# Patient Record
Sex: FEMALE | Race: WHITE | NOT HISPANIC OR LATINO | Employment: FULL TIME | URBAN - METROPOLITAN AREA
[De-identification: names, ages, dates, MRNs, and addresses within clinical notes are randomized per-mention and may not be internally consistent; named-entity substitution may affect disease eponyms.]

---

## 2017-09-30 ENCOUNTER — ALLSCRIPTS OFFICE VISIT (OUTPATIENT)
Dept: OTHER | Facility: OTHER | Age: 47
End: 2017-09-30

## 2017-09-30 DIAGNOSIS — Z00.00 ENCOUNTER FOR GENERAL ADULT MEDICAL EXAMINATION WITHOUT ABNORMAL FINDINGS: ICD-10-CM

## 2017-09-30 DIAGNOSIS — E78.5 HYPERLIPIDEMIA: ICD-10-CM

## 2017-09-30 DIAGNOSIS — Z01.419 ENCOUNTER FOR GYNECOLOGICAL EXAMINATION WITHOUT ABNORMAL FINDING: ICD-10-CM

## 2017-10-06 LAB
HPV 18 (HISTORICAL): NOT DETECTED
HPV HIGH RISK 16/18 (HISTORICAL): NOT DETECTED
HPV16 (HISTORICAL): NOT DETECTED
PAP (HISTORICAL): NORMAL

## 2017-10-12 ENCOUNTER — TRANSCRIBE ORDERS (OUTPATIENT)
Dept: LAB | Facility: CLINIC | Age: 47
End: 2017-10-12

## 2017-10-12 ENCOUNTER — APPOINTMENT (OUTPATIENT)
Dept: LAB | Facility: CLINIC | Age: 47
End: 2017-10-12
Payer: COMMERCIAL

## 2017-10-12 DIAGNOSIS — E78.5 HYPERLIPIDEMIA: ICD-10-CM

## 2017-10-12 DIAGNOSIS — I10 ESSENTIAL HYPERTENSION, MALIGNANT: Primary | ICD-10-CM

## 2017-10-12 DIAGNOSIS — Z00.00 ENCOUNTER FOR GENERAL ADULT MEDICAL EXAMINATION WITHOUT ABNORMAL FINDINGS: ICD-10-CM

## 2017-10-12 DIAGNOSIS — D51.9 ANEMIA DUE TO VITAMIN B12 DEFICIENCY, UNSPECIFIED B12 DEFICIENCY TYPE: ICD-10-CM

## 2017-10-12 DIAGNOSIS — Z01.419 ENCOUNTER FOR GYNECOLOGICAL EXAMINATION WITHOUT ABNORMAL FINDING: ICD-10-CM

## 2017-10-12 DIAGNOSIS — E78.5 HYPERLIPIDEMIA, UNSPECIFIED HYPERLIPIDEMIA TYPE: ICD-10-CM

## 2017-10-12 LAB
ALBUMIN SERPL BCP-MCNC: 4 G/DL (ref 3.5–5)
ALP SERPL-CCNC: 57 U/L (ref 46–116)
ALT SERPL W P-5'-P-CCNC: 21 U/L (ref 12–78)
ANION GAP SERPL CALCULATED.3IONS-SCNC: 5 MMOL/L (ref 4–13)
AST SERPL W P-5'-P-CCNC: 20 U/L (ref 5–45)
BASOPHILS # BLD AUTO: 0.03 THOUSANDS/ΜL (ref 0–0.1)
BASOPHILS NFR BLD AUTO: 1 % (ref 0–1)
BILIRUB SERPL-MCNC: 0.67 MG/DL (ref 0.2–1)
BUN SERPL-MCNC: 15 MG/DL (ref 5–25)
CALCIUM SERPL-MCNC: 9.8 MG/DL (ref 8.3–10.1)
CHLORIDE SERPL-SCNC: 105 MMOL/L (ref 100–108)
CHOLEST SERPL-MCNC: 210 MG/DL (ref 50–200)
CO2 SERPL-SCNC: 28 MMOL/L (ref 21–32)
CREAT SERPL-MCNC: 0.63 MG/DL (ref 0.6–1.3)
EOSINOPHIL # BLD AUTO: 0.15 THOUSAND/ΜL (ref 0–0.61)
EOSINOPHIL NFR BLD AUTO: 2 % (ref 0–6)
ERYTHROCYTE [DISTWIDTH] IN BLOOD BY AUTOMATED COUNT: 13 % (ref 11.6–15.1)
GFR SERPL CREATININE-BSD FRML MDRD: 109 ML/MIN/1.73SQ M
GLUCOSE P FAST SERPL-MCNC: 80 MG/DL (ref 65–99)
HCT VFR BLD AUTO: 41.2 % (ref 34.8–46.1)
HDLC SERPL-MCNC: 95 MG/DL (ref 40–60)
HGB BLD-MCNC: 13.6 G/DL (ref 11.5–15.4)
LDLC SERPL CALC-MCNC: 104 MG/DL (ref 0–100)
LYMPHOCYTES # BLD AUTO: 2.29 THOUSANDS/ΜL (ref 0.6–4.47)
LYMPHOCYTES NFR BLD AUTO: 36 % (ref 14–44)
MCH RBC QN AUTO: 30.4 PG (ref 26.8–34.3)
MCHC RBC AUTO-ENTMCNC: 33 G/DL (ref 31.4–37.4)
MCV RBC AUTO: 92 FL (ref 82–98)
MONOCYTES # BLD AUTO: 0.44 THOUSAND/ΜL (ref 0.17–1.22)
MONOCYTES NFR BLD AUTO: 7 % (ref 4–12)
NEUTROPHILS # BLD AUTO: 3.44 THOUSANDS/ΜL (ref 1.85–7.62)
NEUTS SEG NFR BLD AUTO: 54 % (ref 43–75)
NRBC BLD AUTO-RTO: 0 /100 WBCS
PLATELET # BLD AUTO: 263 THOUSANDS/UL (ref 149–390)
PMV BLD AUTO: 11.6 FL (ref 8.9–12.7)
POTASSIUM SERPL-SCNC: 4.1 MMOL/L (ref 3.5–5.3)
PROT SERPL-MCNC: 8.1 G/DL (ref 6.4–8.2)
RBC # BLD AUTO: 4.47 MILLION/UL (ref 3.81–5.12)
SODIUM SERPL-SCNC: 138 MMOL/L (ref 136–145)
TRIGL SERPL-MCNC: 55 MG/DL
TSH SERPL DL<=0.05 MIU/L-ACNC: 1.3 UIU/ML (ref 0.36–3.74)
WBC # BLD AUTO: 6.36 THOUSAND/UL (ref 4.31–10.16)

## 2017-10-12 PROCEDURE — 84443 ASSAY THYROID STIM HORMONE: CPT

## 2017-10-12 PROCEDURE — 80053 COMPREHEN METABOLIC PANEL: CPT

## 2017-10-12 PROCEDURE — 86663 EPSTEIN-BARR ANTIBODY: CPT

## 2017-10-12 PROCEDURE — 82306 VITAMIN D 25 HYDROXY: CPT

## 2017-10-12 PROCEDURE — 86664 EPSTEIN-BARR NUCLEAR ANTIGEN: CPT

## 2017-10-12 PROCEDURE — 80061 LIPID PANEL: CPT

## 2017-10-12 PROCEDURE — 85025 COMPLETE CBC W/AUTO DIFF WBC: CPT

## 2017-10-12 PROCEDURE — 86665 EPSTEIN-BARR CAPSID VCA: CPT

## 2017-10-12 PROCEDURE — 36415 COLL VENOUS BLD VENIPUNCTURE: CPT

## 2017-10-14 LAB
EBV EA IGG SER-ACNC: 13.1 U/ML (ref 0–8.9)
EBV NA IGG SER IA-ACNC: >600 U/ML (ref 0–17.9)
EBV PATRN SPEC IB-IMP: ABNORMAL
EBV VCA IGG SER IA-ACNC: 282 U/ML (ref 0–17.9)
EBV VCA IGM SER IA-ACNC: <36 U/ML (ref 0–35.9)

## 2017-10-16 LAB
25(OH)D2 SERPL-MCNC: <1 NG/ML
25(OH)D3 SERPL-MCNC: 43 NG/ML
25(OH)D3+25(OH)D2 SERPL-MCNC: 43 NG/ML

## 2018-01-22 VITALS
SYSTOLIC BLOOD PRESSURE: 118 MMHG | HEIGHT: 65 IN | WEIGHT: 144 LBS | DIASTOLIC BLOOD PRESSURE: 82 MMHG | BODY MASS INDEX: 23.99 KG/M2

## 2019-04-01 ENCOUNTER — ANNUAL EXAM (OUTPATIENT)
Dept: OBGYN CLINIC | Facility: CLINIC | Age: 49
End: 2019-04-01
Payer: COMMERCIAL

## 2019-04-01 VITALS
DIASTOLIC BLOOD PRESSURE: 70 MMHG | SYSTOLIC BLOOD PRESSURE: 120 MMHG | WEIGHT: 146 LBS | BODY MASS INDEX: 24.32 KG/M2 | HEIGHT: 65 IN

## 2019-04-01 DIAGNOSIS — Z12.39 SCREENING FOR MALIGNANT NEOPLASM OF BREAST: ICD-10-CM

## 2019-04-01 DIAGNOSIS — Z01.411 ENCOUNTER FOR GYNECOLOGICAL EXAMINATION WITH ABNORMAL FINDING: Primary | ICD-10-CM

## 2019-04-01 DIAGNOSIS — N95.2 ATROPHIC VAGINITIS: ICD-10-CM

## 2019-04-01 PROCEDURE — S0612 ANNUAL GYNECOLOGICAL EXAMINA: HCPCS | Performed by: OBSTETRICS & GYNECOLOGY

## 2019-04-01 RX ORDER — ESTRADIOL 0.1 MG/G
1 CREAM VAGINAL 3 TIMES WEEKLY
Qty: 42.5 G | Refills: 3 | Status: SHIPPED | OUTPATIENT
Start: 2019-04-01 | End: 2022-01-27 | Stop reason: ALTCHOICE

## 2019-04-10 LAB
HPV HR 12 DNA CVX QL NAA+PROBE: NOT DETECTED
HPV16 DNA SPEC QL NAA+PROBE: NOT DETECTED
HPV18 DNA SPEC QL NAA+PROBE: NOT DETECTED
THIN PREP CVX: NORMAL

## 2019-11-16 ENCOUNTER — HOSPITAL ENCOUNTER (EMERGENCY)
Facility: HOSPITAL | Age: 49
Discharge: HOME | End: 2019-11-17
Attending: EMERGENCY MEDICINE | Admitting: EMERGENCY MEDICINE
Payer: COMMERCIAL

## 2019-11-16 VITALS
OXYGEN SATURATION: 98 % | RESPIRATION RATE: 16 BRPM | DIASTOLIC BLOOD PRESSURE: 76 MMHG | WEIGHT: 145 LBS | TEMPERATURE: 97.9 F | HEIGHT: 65 IN | BODY MASS INDEX: 24.16 KG/M2 | HEART RATE: 80 BPM | SYSTOLIC BLOOD PRESSURE: 134 MMHG

## 2019-11-16 DIAGNOSIS — R10.31 RLQ ABDOMINAL PAIN: Primary | ICD-10-CM

## 2019-11-16 LAB
ALBUMIN SERPL BCP-MCNC: 4.1 G/DL (ref 3.5–5)
ALP SERPL-CCNC: 65 U/L (ref 46–116)
ALT SERPL W P-5'-P-CCNC: 21 U/L (ref 12–78)
ANION GAP SERPL CALCULATED.3IONS-SCNC: 5 MMOL/L (ref 4–13)
AST SERPL W P-5'-P-CCNC: 10 U/L (ref 5–45)
BACTERIA UR QL AUTO: ABNORMAL /HPF
BASOPHILS # BLD AUTO: 0.06 THOUSANDS/ΜL (ref 0–0.1)
BASOPHILS NFR BLD AUTO: 1 % (ref 0–1)
BILIRUB SERPL-MCNC: 0.23 MG/DL (ref 0.2–1)
BILIRUB UR QL STRIP: NEGATIVE
BUN SERPL-MCNC: 14 MG/DL (ref 5–25)
CALCIUM SERPL-MCNC: 9.8 MG/DL (ref 8.3–10.1)
CHLORIDE SERPL-SCNC: 108 MMOL/L (ref 100–108)
CLARITY UR: ABNORMAL
CLARITY, POC: CLEAR
CO2 SERPL-SCNC: 28 MMOL/L (ref 21–32)
COLOR UR: YELLOW
COLOR, POC: YELLOW
CREAT SERPL-MCNC: 0.61 MG/DL (ref 0.6–1.3)
EOSINOPHIL # BLD AUTO: 0.08 THOUSAND/ΜL (ref 0–0.61)
EOSINOPHIL NFR BLD AUTO: 2 % (ref 0–6)
ERYTHROCYTE [DISTWIDTH] IN BLOOD BY AUTOMATED COUNT: 12.7 % (ref 11.6–15.1)
GFR SERPL CREATININE-BSD FRML MDRD: 108 ML/MIN/1.73SQ M
GLUCOSE SERPL-MCNC: 95 MG/DL (ref 65–140)
GLUCOSE UR STRIP-MCNC: NEGATIVE MG/DL
HCT VFR BLD AUTO: 41.6 % (ref 34.8–46.1)
HGB BLD-MCNC: 13.4 G/DL (ref 11.5–15.4)
HGB UR QL STRIP.AUTO: ABNORMAL
HYALINE CASTS #/AREA URNS LPF: ABNORMAL /LPF
IMM GRANULOCYTES # BLD AUTO: 0.01 THOUSAND/UL (ref 0–0.2)
IMM GRANULOCYTES NFR BLD AUTO: 0 % (ref 0–2)
KETONES UR STRIP-MCNC: NEGATIVE MG/DL
LEUKOCYTE ESTERASE UR QL STRIP: NEGATIVE
LIPASE SERPL-CCNC: 120 U/L (ref 73–393)
LYMPHOCYTES # BLD AUTO: 1.75 THOUSANDS/ΜL (ref 0.6–4.47)
LYMPHOCYTES NFR BLD AUTO: 36 % (ref 14–44)
MCH RBC QN AUTO: 30.6 PG (ref 26.8–34.3)
MCHC RBC AUTO-ENTMCNC: 32.2 G/DL (ref 31.4–37.4)
MCV RBC AUTO: 95 FL (ref 82–98)
MONOCYTES # BLD AUTO: 0.34 THOUSAND/ΜL (ref 0.17–1.22)
MONOCYTES NFR BLD AUTO: 7 % (ref 4–12)
NEUTROPHILS # BLD AUTO: 2.59 THOUSANDS/ΜL (ref 1.85–7.62)
NEUTS SEG NFR BLD AUTO: 54 % (ref 43–75)
NITRITE UR QL STRIP: NEGATIVE
NON-SQ EPI CELLS URNS QL MICRO: ABNORMAL /HPF
NRBC BLD AUTO-RTO: 0 /100 WBCS
PH UR STRIP.AUTO: 7.5 [PH] (ref 4.5–8)
PLATELET # BLD AUTO: 253 THOUSANDS/UL (ref 149–390)
PMV BLD AUTO: 10.7 FL (ref 8.9–12.7)
POTASSIUM SERPL-SCNC: 3.8 MMOL/L (ref 3.5–5.3)
PROT SERPL-MCNC: 8 G/DL (ref 6.4–8.2)
PROT UR STRIP-MCNC: NEGATIVE MG/DL
RBC # BLD AUTO: 4.38 MILLION/UL (ref 3.81–5.12)
RBC #/AREA URNS AUTO: ABNORMAL /HPF
SODIUM SERPL-SCNC: 141 MMOL/L (ref 136–145)
SP GR UR STRIP.AUTO: 1.02 (ref 1–1.03)
UROBILINOGEN UR QL STRIP.AUTO: 0.2 E.U./DL
WBC # BLD AUTO: 4.83 THOUSAND/UL (ref 4.31–10.16)
WBC #/AREA URNS AUTO: ABNORMAL /HPF

## 2019-11-16 PROCEDURE — 83690 ASSAY OF LIPASE: CPT | Performed by: EMERGENCY MEDICINE

## 2019-11-16 PROCEDURE — 81001 URINALYSIS AUTO W/SCOPE: CPT

## 2019-11-16 PROCEDURE — 36415 COLL VENOUS BLD VENIPUNCTURE: CPT | Performed by: EMERGENCY MEDICINE

## 2019-11-16 PROCEDURE — 85025 COMPLETE CBC W/AUTO DIFF WBC: CPT | Performed by: EMERGENCY MEDICINE

## 2019-11-16 PROCEDURE — 99284 EMERGENCY DEPT VISIT MOD MDM: CPT

## 2019-11-16 PROCEDURE — 99284 EMERGENCY DEPT VISIT MOD MDM: CPT | Performed by: EMERGENCY MEDICINE

## 2019-11-16 PROCEDURE — 80053 COMPREHEN METABOLIC PANEL: CPT | Performed by: EMERGENCY MEDICINE

## 2019-11-17 ENCOUNTER — APPOINTMENT (EMERGENCY)
Dept: RADIOLOGY | Facility: HOSPITAL | Age: 49
End: 2019-11-17
Payer: COMMERCIAL

## 2019-11-17 LAB
EXT PREG TEST URINE: NEGATIVE
EXT. CONTROL ED NAV: NORMAL

## 2019-11-17 PROCEDURE — 81025 URINE PREGNANCY TEST: CPT | Performed by: EMERGENCY MEDICINE

## 2019-11-17 PROCEDURE — 74177 CT ABD & PELVIS W/CONTRAST: CPT

## 2019-11-17 RX ADMIN — IOHEXOL 100 ML: 350 INJECTION, SOLUTION INTRAVENOUS at 00:05

## 2019-11-17 NOTE — ED PROVIDER NOTES
History  Chief Complaint   Patient presents with    Pelvic Pain     pt reports sharp R sided pelvic pain since about 0900 today  pt reports she had the ovaries and tubes removed 5-6 years ago d/t cysts  pt reports N and loss of appetite  A 51-year-old female status post oophorectomy and salpingectomy presenting with right lower quadrant pain for the last 14 hours  She describes the pain as a sharp nonradiating pain in the right lower quadrant that comes and goes at random and lasts minutes at a time  It is sensitive to palpation in this area  Denies any other aggravating or alleviating factors  She notes associated nausea and decreased appetite  Denies vomiting or diarrhea  Normal bowel movement several hours ago that was soft nonbloody and nonmelanotic  Denies any dysuria hematuria or frequency  No fevers or chills  Prior to Admission Medications   Prescriptions Last Dose Informant Patient Reported? Taking?   estradiol (ESTRACE) 0 1 mg/g vaginal cream   No No   Sig: Insert 1 g into the vagina 3 (three) times a week      Facility-Administered Medications: None       Past Medical History:   Diagnosis Date    Hyperlipidemia        Past Surgical History:   Procedure Laterality Date     SECTION      OOPHORECTOMY Right        Family History   Problem Relation Age of Onset    Diabetes Father     Clotting disorder Father      I have reviewed and agree with the history as documented  Social History     Tobacco Use    Smoking status: Never Smoker    Smokeless tobacco: Never Used   Substance Use Topics    Alcohol use: No    Drug use: No        Review of Systems   Constitutional: Positive for appetite change  Negative for chills and fever  HENT: Negative for congestion and sore throat  Eyes: Negative for visual disturbance  Respiratory: Negative for cough and shortness of breath  Cardiovascular: Negative for chest pain and palpitations     Gastrointestinal: Positive for abdominal pain and nausea  Negative for diarrhea and vomiting  Genitourinary: Negative for difficulty urinating and dysuria  Musculoskeletal: Negative for myalgias  Skin: Negative for rash  Neurological: Negative for weakness, light-headedness, numbness and headaches  Physical Exam  ED Triage Vitals [11/16/19 2034]   Temperature Pulse Respirations Blood Pressure SpO2   (!) 97 4 °F (36 3 °C) 86 16 142/85 100 %      Temp Source Heart Rate Source Patient Position - Orthostatic VS BP Location FiO2 (%)   Oral -- Sitting Left arm --      Pain Score       2             Orthostatic Vital Signs  Vitals:    11/16/19 2034 11/16/19 2330   BP: 142/85 134/76   Pulse: 86 80   Patient Position - Orthostatic VS: Sitting Lying       Physical Exam   Constitutional: She is oriented to person, place, and time  She appears well-developed and well-nourished  No distress  HENT:   Head: Normocephalic and atraumatic  Mouth/Throat: Oropharynx is clear and moist    Eyes: Pupils are equal, round, and reactive to light  EOM are normal    Neck: Normal range of motion  Neck supple  Cardiovascular: Normal rate, regular rhythm, normal heart sounds and intact distal pulses  Pulmonary/Chest: Effort normal and breath sounds normal  No respiratory distress  Abdominal: Soft  Bowel sounds are normal  She exhibits no distension and no mass  There is tenderness  There is rebound  There is no guarding  Musculoskeletal: Normal range of motion  Neurological: She is alert and oriented to person, place, and time  No cranial nerve deficit  Skin: Skin is warm and dry  Capillary refill takes less than 2 seconds  Psychiatric: She has a normal mood and affect  Nursing note and vitals reviewed        ED Medications  Medications   iohexol (OMNIPAQUE) 350 MG/ML injection (MULTI-DOSE) 100 mL (100 mL Intravenous Given 11/17/19 0005)       Diagnostic Studies  Results Reviewed     Procedure Component Value Units Date/Time    POCT pregnancy, urine [[id number]]  (Normal) Resulted:  11/17/19 0100    Lab Status:  Final result Updated:  11/17/19 0100     EXT PREG TEST UR (Ref: Negative) Negative     Control Valid    Comprehensive metabolic panel [[id number]] Collected:  11/16/19 2326    Lab Status:  Final result Specimen:  Blood from Arm, Left Updated:  11/16/19 2357     Sodium 141 mmol/L      Potassium 3 8 mmol/L      Chloride 108 mmol/L      CO2 28 mmol/L      ANION GAP 5 mmol/L      BUN 14 mg/dL      Creatinine 0 61 mg/dL      Glucose 95 mg/dL      Calcium 9 8 mg/dL      AST 10 U/L      ALT 21 U/L      Alkaline Phosphatase 65 U/L      Total Protein 8 0 g/dL      Albumin 4 1 g/dL      Total Bilirubin 0 23 mg/dL      eGFR 108 ml/min/1 73sq m     Narrative:       [place] guidelines for Chronic Kidney Disease (CKD):     Stage 1 with normal or high GFR (GFR > 90 mL/min/1 73 square meters)    Stage 2 Mild CKD (GFR = 60-89 mL/min/1 73 square meters)    Stage 3A Moderate CKD (GFR = 45-59 mL/min/1 73 square meters)    Stage 3B Moderate CKD (GFR = 30-44 mL/min/1 73 square meters)    Stage 4 Severe CKD (GFR = 15-29 mL/min/1 73 square meters)    Stage 5 End Stage CKD (GFR <15 mL/min/1 73 square meters)  Note: GFR calculation is accurate only with a steady state creatinine    Lipase [[id number]]  (Normal) Collected:  11/16/19 2326    Lab Status:  Final result Specimen:  Blood from Arm, Left Updated:  11/16/19 2357     Lipase 120 u/L     Urine Microscopic [[id number]]  (Abnormal) Collected:  11/16/19 2337    Lab Status:  Final result Specimen:  Urine, Clean Catch Updated:  11/16/19 2349     RBC, UA Innumerable /hpf      WBC, UA 2-4 /hpf      Epithelial Cells None Seen /hpf      Bacteria, UA None Seen /hpf      Hyaline Casts, UA None Seen /lpf     CBC and differential [[id number]] Collected:  11/16/19 2326    Lab Status:  Final result Specimen:  Blood from Arm, Left Updated:  11/16/19 2339     WBC 4 83 Thousand/uL      RBC 4 38 Million/uL Hemoglobin 13 4 g/dL      Hematocrit 41 6 %      MCV 95 fL      MCH 30 6 pg      MCHC 32 2 g/dL      RDW 12 7 %      MPV 10 7 fL      Platelets 395 Thousands/uL      nRBC 0 /100 WBCs      Neutrophils Relative 54 %      Immat GRANS % 0 %      Lymphocytes Relative 36 %      Monocytes Relative 7 %      Eosinophils Relative 2 %      Basophils Relative 1 %      Neutrophils Absolute 2 59 Thousands/µL      Immature Grans Absolute 0 01 Thousand/uL      Lymphocytes Absolute 1 75 Thousands/µL      Monocytes Absolute 0 34 Thousand/µL      Eosinophils Absolute 0 08 Thousand/µL      Basophils Absolute 0 06 Thousands/µL     POCT urinalysis dipstick [[id number]]  (Normal) Resulted:  11/16/19 2338    Lab Status:  Final result Specimen:  Urine Updated:  11/16/19 2339     Color, UA yellow     Clarity, UA clear    Urine Macroscopic, POC [[id number]]  (Abnormal) Collected:  11/16/19 2337    Lab Status:  Final result Specimen:  Urine Updated:  11/16/19 2338     Color, UA Yellow     Clarity, UA Cloudy     pH, UA 7 5     Leukocytes, UA Negative     Nitrite, UA Negative     Protein, UA Negative mg/dl      Glucose, UA Negative mg/dl      Ketones, UA Negative mg/dl      Urobilinogen, UA 0 2 E U /dl      Bilirubin, UA Negative     Blood, UA Large     Specific [place], UA 1 020    Narrative:       [facility] RESULT                 CT abdomen pelvis with contrast   Final Result by Rodo Blake DO (11/17 0026)      Small left-sided corpus luteal cyst            Workstation performed: [id number]               Procedures  Procedures        ED Course                               MDM  Number of Diagnoses or Management Options  Diagnosis management comments: 82-year-old female status post oophorectomy inset inject any presenting with right lower quadrant abdominal pain for the last day  Does have some rebound on exam though otherwise appears fairly well  She has a decreased appetite  History and physical are concerning for appendicitis    Will also evaluate for urinary tract infection  Check abdominal labs  CT scan with IV contrast   Patient states she does not need pain medication or nausea medication at this time  Disposition  Final diagnoses:   RLQ abdominal pain     Time reflects when diagnosis was documented in both MDM as applicable and the Disposition within this note     Time User Action Codes Description Comment    11/17/2019  1:11 AM Samaria Reed Add [R10 31] RLQ abdominal pain       ED Disposition     ED Disposition Condition Date/Time Comment    Discharge Stable Sun Nov 17, 2019  1:11 AM Boogie Girard discharge to home/self care  Follow-up Information     Follow up With Specialties Details Why Contact Info Additional Information    Ana Cristina Ventura MD Internal Medicine Schedule an appointment as soon as possible for a visit   [address]  [facility] Emergency Department Emergency Medicine Go to  If symptoms worsen [address] [facility] [address]  [phone] [facility], [address] [place] [address], [id number]   [phone]          Discharge Medication List as of 11/17/2019  1:12 AM      CONTINUE these medications which have NOT CHANGED    Details   estradiol (ESTRACE) 0 1 mg/g vaginal cream Insert 1 g into the vagina 3 (three) times a week, Starting Mon 4/1/2019, Normal           No discharge procedures on file  ED Provider  Attending physically available and evaluated Boogie Girard I managed the patient along with the ED Attending      Electronically Signed by         Han Colorado MD  11/17/19 1980

## 2019-11-17 NOTE — ED NOTES
Urine sample being sent back up from lab to complete Urine POCT      Waqas Reinoso RN  11/17/19 0443

## 2019-11-18 NOTE — ED ATTENDING ATTESTATION
11/16/2019  I, Bora Estevez DO, saw and evaluated the patient  I have discussed the patient with the resident/non-physician practitioner and agree with the resident's/non-physician practitioner's findings, Plan of Care, and MDM as documented in the resident's/non-physician practitioner's note, except where noted  All available labs and Radiology studies were reviewed  I was present for key portions of any procedure(s) performed by the resident/non-physician practitioner and I was immediately available to provide assistance  At this point I agree with the current assessment done in the Emergency Department  I have conducted an independent evaluation of this patient a history and physical is as follows:    ED Course       53 y/o female presenting with RLQ pain x 14 hours  Pt with past surgical history of right sided oophorectomy and salpingectomy  TTP in RLQ over McBurney's point x 12 hours  Colicky, intermittent pain  No zoster rash  No guarding, but mild rebound Is noted on exam    Afebrile, but does endorse anorexia  Will obtain CT to assess for appendicitis  No urinary complaints  UPreg negative  Labs reviewed  CT neg x for left corpus luteal cyst    Jan Bullion diet, motrin/tylenol, f/u PCP, return if worsens       Critical Care Time  Procedures

## 2020-01-10 ENCOUNTER — HOSPITAL ENCOUNTER (EMERGENCY)
Facility: HOSPITAL | Age: 50
Discharge: HOME | End: 2020-01-11
Attending: EMERGENCY MEDICINE | Admitting: EMERGENCY MEDICINE
Payer: COMMERCIAL

## 2020-01-10 VITALS
OXYGEN SATURATION: 99 % | SYSTOLIC BLOOD PRESSURE: 144 MMHG | DIASTOLIC BLOOD PRESSURE: 60 MMHG | TEMPERATURE: 98.1 F | RESPIRATION RATE: 18 BRPM | BODY MASS INDEX: 24.62 KG/M2 | WEIGHT: 147.93 LBS | HEART RATE: 61 BPM

## 2020-01-10 DIAGNOSIS — R07.89 ATYPICAL CHEST PAIN: Primary | ICD-10-CM

## 2020-01-10 LAB
ALBUMIN SERPL BCP-MCNC: 3.8 G/DL (ref 3.5–5)
ALP SERPL-CCNC: 73 U/L (ref 46–116)
ALT SERPL W P-5'-P-CCNC: 26 U/L (ref 12–78)
ANION GAP SERPL CALCULATED.3IONS-SCNC: 4 MMOL/L (ref 4–13)
APTT PPP: 29 SECONDS (ref 23–37)
AST SERPL W P-5'-P-CCNC: 18 U/L (ref 5–45)
BASOPHILS # BLD AUTO: 0.05 THOUSANDS/ΜL (ref 0–0.1)
BASOPHILS NFR BLD AUTO: 1 % (ref 0–1)
BILIRUB SERPL-MCNC: 0.19 MG/DL (ref 0.2–1)
BUN SERPL-MCNC: 18 MG/DL (ref 5–25)
CALCIUM SERPL-MCNC: 9.9 MG/DL (ref 8.3–10.1)
CHLORIDE SERPL-SCNC: 107 MMOL/L (ref 100–108)
CO2 SERPL-SCNC: 31 MMOL/L (ref 21–32)
CREAT SERPL-MCNC: 0.58 MG/DL (ref 0.6–1.3)
EOSINOPHIL # BLD AUTO: 0.2 THOUSAND/ΜL (ref 0–0.61)
EOSINOPHIL NFR BLD AUTO: 4 % (ref 0–6)
ERYTHROCYTE [DISTWIDTH] IN BLOOD BY AUTOMATED COUNT: 12.6 % (ref 11.6–15.1)
GFR SERPL CREATININE-BSD FRML MDRD: 110 ML/MIN/1.73SQ M
GLUCOSE SERPL-MCNC: 94 MG/DL (ref 65–140)
HCT VFR BLD AUTO: 39.4 % (ref 34.8–46.1)
HGB BLD-MCNC: 12.8 G/DL (ref 11.5–15.4)
IMM GRANULOCYTES # BLD AUTO: 0.01 THOUSAND/UL (ref 0–0.2)
IMM GRANULOCYTES NFR BLD AUTO: 0 % (ref 0–2)
INR PPP: 0.96 (ref 0.84–1.19)
LYMPHOCYTES # BLD AUTO: 2.3 THOUSANDS/ΜL (ref 0.6–4.47)
LYMPHOCYTES NFR BLD AUTO: 46 % (ref 14–44)
MCH RBC QN AUTO: 30.3 PG (ref 26.8–34.3)
MCHC RBC AUTO-ENTMCNC: 32.5 G/DL (ref 31.4–37.4)
MCV RBC AUTO: 93 FL (ref 82–98)
MONOCYTES # BLD AUTO: 0.44 THOUSAND/ΜL (ref 0.17–1.22)
MONOCYTES NFR BLD AUTO: 9 % (ref 4–12)
NEUTROPHILS # BLD AUTO: 1.99 THOUSANDS/ΜL (ref 1.85–7.62)
NEUTS SEG NFR BLD AUTO: 40 % (ref 43–75)
NRBC BLD AUTO-RTO: 0 /100 WBCS
PLATELET # BLD AUTO: 237 THOUSANDS/UL (ref 149–390)
PMV BLD AUTO: 10.8 FL (ref 8.9–12.7)
POTASSIUM SERPL-SCNC: 3.9 MMOL/L (ref 3.5–5.3)
PROT SERPL-MCNC: 7.5 G/DL (ref 6.4–8.2)
PROTHROMBIN TIME: 12.2 SECONDS (ref 11.6–14.5)
RBC # BLD AUTO: 4.23 MILLION/UL (ref 3.81–5.12)
SODIUM SERPL-SCNC: 142 MMOL/L (ref 136–145)
TROPONIN I SERPL-MCNC: <0.02 NG/ML
WBC # BLD AUTO: 4.99 THOUSAND/UL (ref 4.31–10.16)

## 2020-01-10 PROCEDURE — 36415 COLL VENOUS BLD VENIPUNCTURE: CPT | Performed by: EMERGENCY MEDICINE

## 2020-01-10 PROCEDURE — 84484 ASSAY OF TROPONIN QUANT: CPT | Performed by: EMERGENCY MEDICINE

## 2020-01-10 PROCEDURE — 85610 PROTHROMBIN TIME: CPT | Performed by: EMERGENCY MEDICINE

## 2020-01-10 PROCEDURE — 85730 THROMBOPLASTIN TIME PARTIAL: CPT | Performed by: EMERGENCY MEDICINE

## 2020-01-10 PROCEDURE — 99285 EMERGENCY DEPT VISIT HI MDM: CPT

## 2020-01-10 PROCEDURE — 93005 ELECTROCARDIOGRAM TRACING: CPT

## 2020-01-10 PROCEDURE — 99284 EMERGENCY DEPT VISIT MOD MDM: CPT | Performed by: EMERGENCY MEDICINE

## 2020-01-10 PROCEDURE — 80053 COMPREHEN METABOLIC PANEL: CPT | Performed by: EMERGENCY MEDICINE

## 2020-01-10 PROCEDURE — 85025 COMPLETE CBC W/AUTO DIFF WBC: CPT | Performed by: EMERGENCY MEDICINE

## 2020-01-11 LAB
ATRIAL RATE: 72 BPM
P AXIS: 64 DEGREES
PR INTERVAL: 132 MS
QRS AXIS: 63 DEGREES
QRSD INTERVAL: 96 MS
QT INTERVAL: 368 MS
QTC INTERVAL: 402 MS
T WAVE AXIS: 62 DEGREES
VENTRICULAR RATE: 72 BPM

## 2020-01-11 PROCEDURE — 93010 ELECTROCARDIOGRAM REPORT: CPT | Performed by: INTERNAL MEDICINE

## 2020-01-11 NOTE — ED PROVIDER NOTES
History  Chief Complaint   Patient presents with    Chest Pain     Pt presents to ED with left sided chest pain that radiates down left arm starting around 4:30pm  Pt reports dizziness  History provided by:  Patient and spouse  Chest Pain   Pain location:  L lateral chest  Pain quality: aching    Pain radiates to:  Does not radiate  Pain radiates to the back: no    Pain severity:  Mild  Onset quality:  Gradual  Duration:  1 day (Since waking over 430 this morning)  Timing:  Constant  Progression:  Waxing and waning  Chronicity:  New  Context comment:  Patient states she woke up at 4:30 a m  This morning head some left-sided achy chest pain, has been constant since  , waxing and waning in intensity but constant  No triggering or worsening factors not worsened with exertionno associated diaphoresis or SOB  Relieved by:  None tried  Worsened by:  Nothing tried  Ineffective treatments:  None tried  Associated symptoms: numbness    Associated symptoms: no abdominal pain, no cough, no diaphoresis, no dizziness, no fever, no headache, no nausea, no palpitations, no shortness of breath and not vomiting    Associated symptoms comment:  Intermittent numbness and tingling left side of face and left arm, this is been a problem for multiple months      Prior to Admission Medications   Prescriptions Last Dose Informant Patient Reported? Taking?   estradiol (ESTRACE) 0 1 mg/g vaginal cream   No No   Sig: Insert 1 g into the vagina 3 (three) times a week      Facility-Administered Medications: None       Past Medical History:   Diagnosis Date    Hyperlipidemia        Past Surgical History:   Procedure Laterality Date     SECTION      OOPHORECTOMY Right        Family History   Problem Relation Age of Onset    Diabetes Father     Clotting disorder Father      I have reviewed and agree with the history as documented      Social History     Tobacco Use    Smoking status: Never Smoker    Smokeless tobacco: Never Used   Substance Use Topics    Alcohol use: No    Drug use: No        Review of Systems   Constitutional: Negative for activity change, chills, diaphoresis and fever  HENT: Negative for congestion, sinus pressure and sore throat  Eyes: Negative for pain and visual disturbance  Respiratory: Negative for cough, chest tightness, shortness of breath, wheezing and stridor  Cardiovascular: Positive for chest pain  Negative for palpitations  Gastrointestinal: Negative for abdominal distention, abdominal pain, constipation, diarrhea, nausea and vomiting  Genitourinary: Negative for dysuria and frequency  Musculoskeletal: Negative for neck pain and neck stiffness  Skin: Negative for rash  Neurological: Positive for numbness  Negative for dizziness, speech difficulty, light-headedness and headaches  Physical Exam  Physical Exam   Constitutional: She is oriented to person, place, and time  She appears well-developed  No distress  HENT:   Head: Normocephalic and atraumatic  Eyes: Pupils are equal, round, and reactive to light  Neck: Normal range of motion  Neck supple  No tracheal deviation present  Cardiovascular: Normal rate, regular rhythm, normal heart sounds and intact distal pulses  No murmur heard  Pulmonary/Chest: Effort normal and breath sounds normal  No stridor  No respiratory distress  Abdominal: Soft  She exhibits no distension  There is no tenderness  There is no rebound and no guarding  Musculoskeletal: Normal range of motion  Neurological: She is alert and oriented to person, place, and time  Skin: Skin is warm and dry  She is not diaphoretic  No erythema  No pallor  Psychiatric: She has a normal mood and affect  Vitals reviewed        Vital Signs  ED Triage Vitals [01/10/20 1845]   Temperature Pulse Respirations Blood Pressure SpO2   98 1 °F (36 7 °C) 81 18 146/80 100 %      Temp Source Heart Rate Source Patient Position - Orthostatic VS BP Location FiO2 (%) Oral Monitor Lying Right arm --      Pain Score       4           Vitals:    01/10/20 1845 01/10/20 2001   BP: 146/80 144/60   Pulse: 81 61   Patient Position - Orthostatic VS: Lying Sitting         Visual Acuity      ED Medications  Medications - No data to display    Diagnostic Studies  Results Reviewed     Procedure Component Value Units Date/Time    Comprehensive metabolic panel [[id number]]  (Abnormal) Collected:  01/10/20 2022    Lab Status:  Final result Specimen:  Blood from Arm, Left Updated:  01/10/20 2051     Sodium 142 mmol/L      Potassium 3 9 mmol/L      Chloride 107 mmol/L      CO2 31 mmol/L      ANION GAP 4 mmol/L      BUN 18 mg/dL      Creatinine 0 58 mg/dL      Glucose 94 mg/dL      Calcium 9 9 mg/dL      AST 18 U/L      ALT 26 U/L      Alkaline Phosphatase 73 U/L      Total Protein 7 5 g/dL      Albumin 3 8 g/dL      Total Bilirubin 0 19 mg/dL      eGFR 110 ml/min/1 73sq m     Narrative:       [place] guidelines for Chronic Kidney Disease (CKD):     Stage 1 with normal or high GFR (GFR > 90 mL/min/1 73 square meters)    Stage 2 Mild CKD (GFR = 60-89 mL/min/1 73 square meters)    Stage 3A Moderate CKD (GFR = 45-59 mL/min/1 73 square meters)    Stage 3B Moderate CKD (GFR = 30-44 mL/min/1 73 square meters)    Stage 4 Severe CKD (GFR = 15-29 mL/min/1 73 square meters)    Stage 5 End Stage CKD (GFR <15 mL/min/1 73 square meters)  Note: GFR calculation is accurate only with a steady state creatinine    Protime-INR [[id number]]  (Normal) Collected:  01/10/20 2022    Lab Status:  Final result Specimen:  Blood from Arm, Left Updated:  01/10/20 2037     Protime 12 2 seconds      INR 0 96    APTT [[id number]]  (Normal) Collected:  01/10/20 2022    Lab Status:  Final result Specimen:  Blood from Arm, Left Updated:  01/10/20 2037     PTT 29 seconds     Troponin I [[id number]]  (Normal) Collected:  01/10/20 1945    Lab Status:  Final result Specimen:  Blood from Arm, Right Updated: 01/10/20 2011     Troponin I <0 02 ng/mL     CBC and differential [[id number]]  (Abnormal) Collected:  01/10/20 1945    Lab Status:  Final result Specimen:  Blood from Arm, Right Updated:  01/10/20 1952     WBC 4 99 Thousand/uL      RBC 4 23 Million/uL      Hemoglobin 12 8 g/dL      Hematocrit 39 4 %      MCV 93 fL      MCH 30 3 pg      MCHC 32 5 g/dL      RDW 12 6 %      MPV 10 8 fL      Platelets 630 Thousands/uL      nRBC 0 /100 WBCs      Neutrophils Relative 40 %      Immat GRANS % 0 %      Lymphocytes Relative 46 %      Monocytes Relative 9 %      Eosinophils Relative 4 %      Basophils Relative 1 %      Neutrophils Absolute 1 99 Thousands/µL      Immature Grans Absolute 0 01 Thousand/uL      Lymphocytes Absolute 2 30 Thousands/µL      Monocytes Absolute 0 44 Thousand/µL      Eosinophils Absolute 0 20 Thousand/µL      Basophils Absolute 0 05 Thousands/µL                  No orders to display              Procedures  ECG 12 Lead Documentation Only  Date/Time: 1/10/2020 7:46 PM  Performed by: Carmen Dubose DO  Authorized by: Carmen Dubose DO     ECG reviewed by me, the ED Provider: yes    Patient location:  ED  Previous ECG:     Previous ECG:  Compared to current    Comparison ECG info:  12 18 2012    Similarity:  No change  Interpretation:     Interpretation: normal    Rate:     ECG rate:  72    ECG rate assessment: normal    Rhythm:     Rhythm: sinus rhythm    Ectopy:     Ectopy: none    QRS:     QRS axis:  Normal    QRS intervals:  Normal  Conduction:     Conduction: normal    ST segments:     ST segments:  Normal  T waves:     T waves: normal               ED Course                               MDM  Number of Diagnoses or Management Options  Atypical chest pain: new and requires workup  Diagnosis management comments:       Initial ED assessment:  Nontoxic appearing 61-year-old cardiac risk factors no early family coronary artery disease no family history of early sudden death, never diagnosed hypertension hyperlipidemia  , never smoker  Presents with chest pain since awakening this morning  , pain has been waxing waning in intensity but having some degree of constant pain all day  , no diaphoresis no shortness of breath    Initial DDx includes but is not limited to:   Doubt cardiac etiology most likely musculoskeletal     Initial ED plan:   Blood work, EKG, likely discharge        Final ED summary/disposition:   After evaluation and workup in the emergency department, workup unremarkable, patient discharged will follow with outpatient PCP        Amount and/or Complexity of Data Reviewed  Clinical lab tests: ordered and reviewed  Tests in the radiology section of CPT®: ordered and reviewed  Decide to obtain previous medical records or to obtain history from someone other than the patient: yes  Obtain history from someone other than the patient: yes  Review and summarize past medical records: yes  Independent visualization of images, tracings, or specimens: yes          Disposition  Final diagnoses:   Atypical chest pain     Time reflects when diagnosis was documented in both MDM as applicable and the Disposition within this note     Time User Action Codes Description Comment    1/10/2020  9:24 PM Garrison Seferino Add [R07 89] Atypical chest pain       ED Disposition     ED Disposition Condition Date/Time Comment    Discharge Stable Fri Reynold 10, 2020  9:24 PM Brian Dias discharge to home/self care  Follow-up Information    None         Patient's Medications   Discharge Prescriptions    No medications on file     No discharge procedures on file      ED Provider  Electronically Signed by           Estephania Johnson DO  01/10/20 9120

## 2020-09-03 ENCOUNTER — TELEPHONE (OUTPATIENT)
Dept: OBGYN CLINIC | Facility: CLINIC | Age: 50
End: 2020-09-03

## 2020-09-03 DIAGNOSIS — N92.6 IRREGULAR MENSTRUAL CYCLE: Primary | ICD-10-CM

## 2020-09-03 NOTE — TELEPHONE ENCOUNTER
Pt will complete TFTs at [address] we will call back with results  Keeping annual apt with Dr Rolanda العلي

## 2020-09-03 NOTE — TELEPHONE ENCOUNTER
Black cohosh and Estroven only really help minimally, if she would like to try she can just needs to watch for BP increases  Can send for thyroid studies to be sure normal but likely perimenopausal symptoms and Dr Evette Tsang can review further at her annual or you can offer her an appointment sooner to discuss other options

## 2020-09-03 NOTE — TELEPHONE ENCOUNTER
No period Jan 2020, Feb monthly occurrence until June when she got menses 11 days early  Then in July got menses a week late  On August 5  menses started then 11 days later got bleeding again on August 16  Menses lasts for 4 days use to get menses every 28 days  Pt feels like her period is always coming - hormonal symptoms (tired, emotional, crampy)  Pt currently Midol taken daily 4 times  Pt would like to know what she can take to help with her symptoms understand she could be starting the changes  Denies heat flashes or vaginal dryness  Would you recommend black cohost or estroven?  Pt has annual with Dr Ryan Barrera 10/16/20

## 2020-09-05 LAB
T4 FREE SERPL-MCNC: 0.9 NG/DL (ref 0.8–1.8)
TSH SERPL-ACNC: 3.44 MIU/L

## 2020-09-10 ENCOUNTER — TELEPHONE (OUTPATIENT)
Dept: OBGYN CLINIC | Facility: CLINIC | Age: 50
End: 2020-09-10

## 2020-09-10 NOTE — TELEPHONE ENCOUNTER
Patient has annual scheduled for October with Dr Roberto Kendrick  Was sent to TFTs due to irreg cycles and feelings of getting menses and also hormonal changes  Not sure if going through the change  Patient aware will review with DR Roberto Kendrick prior to apt and see if she would like her to have any other labs prior

## 2020-09-11 DIAGNOSIS — E34.9 HORMONE DISTURBANCE: ICD-10-CM

## 2020-09-11 DIAGNOSIS — N92.6 IRREGULAR MENSTRUAL CYCLE: Primary | ICD-10-CM

## 2020-09-14 LAB
BASOPHILS # BLD AUTO: 50 CELLS/UL (ref 0–200)
BASOPHILS NFR BLD AUTO: 0.9 %
EOSINOPHIL # BLD AUTO: 171 CELLS/UL (ref 15–500)
EOSINOPHIL NFR BLD AUTO: 3.1 %
ERYTHROCYTE [DISTWIDTH] IN BLOOD BY AUTOMATED COUNT: 12.7 % (ref 11–15)
FERRITIN SERPL-MCNC: 26 NG/ML (ref 16–232)
FSH SERPL-ACNC: 14.6 MIU/ML
HCT VFR BLD AUTO: 40 % (ref 35–45)
HGB BLD-MCNC: 13 G/DL (ref 11.7–15.5)
IRON SATN MFR SERPL: 40 % (CALC) (ref 16–45)
IRON SERPL-MCNC: 104 MCG/DL (ref 40–190)
LH SERPL-ACNC: 22.1 MIU/ML
LYMPHOCYTES # BLD AUTO: 2019 CELLS/UL (ref 850–3900)
LYMPHOCYTES NFR BLD AUTO: 36.7 %
MCH RBC QN AUTO: 30 PG (ref 27–33)
MCHC RBC AUTO-ENTMCNC: 32.5 G/DL (ref 32–36)
MCV RBC AUTO: 92.2 FL (ref 80–100)
MONOCYTES # BLD AUTO: 479 CELLS/UL (ref 200–950)
MONOCYTES NFR BLD AUTO: 8.7 %
NEUTROPHILS # BLD AUTO: 2783 CELLS/UL (ref 1500–7800)
NEUTROPHILS NFR BLD AUTO: 50.6 %
PLATELET # BLD AUTO: 245 THOUSAND/UL (ref 140–400)
PMV BLD REES-ECKER: 11.4 FL (ref 7.5–12.5)
RBC # BLD AUTO: 4.34 MILLION/UL (ref 3.8–5.1)
TIBC SERPL-MCNC: 262 MCG/DL (CALC) (ref 250–450)
WBC # BLD AUTO: 5.5 THOUSAND/UL (ref 3.8–10.8)

## 2020-09-30 ENCOUNTER — APPOINTMENT (EMERGENCY)
Dept: RADIOLOGY | Facility: HOSPITAL | Age: 50
End: 2020-09-30
Payer: COMMERCIAL

## 2020-09-30 ENCOUNTER — HOSPITAL ENCOUNTER (EMERGENCY)
Facility: HOSPITAL | Age: 50
Discharge: HOME | End: 2020-09-30
Attending: EMERGENCY MEDICINE | Admitting: EMERGENCY MEDICINE
Payer: COMMERCIAL

## 2020-09-30 VITALS
HEART RATE: 100 BPM | DIASTOLIC BLOOD PRESSURE: 80 MMHG | SYSTOLIC BLOOD PRESSURE: 132 MMHG | RESPIRATION RATE: 18 BRPM | OXYGEN SATURATION: 99 % | TEMPERATURE: 98.9 F

## 2020-09-30 DIAGNOSIS — N20.0 NEPHROLITHIASIS: ICD-10-CM

## 2020-09-30 DIAGNOSIS — M54.9 BACK PAIN: Primary | ICD-10-CM

## 2020-09-30 LAB
ALBUMIN SERPL BCP-MCNC: 3.7 G/DL (ref 3.5–5)
ALP SERPL-CCNC: 69 U/L (ref 46–116)
ALT SERPL W P-5'-P-CCNC: 23 U/L (ref 12–78)
ANION GAP SERPL CALCULATED.3IONS-SCNC: 4 MMOL/L (ref 4–13)
AST SERPL W P-5'-P-CCNC: 18 U/L (ref 5–45)
BACTERIA UR QL AUTO: ABNORMAL /HPF
BASOPHILS # BLD AUTO: 0.06 THOUSANDS/ΜL (ref 0–0.1)
BASOPHILS NFR BLD AUTO: 1 % (ref 0–1)
BILIRUB SERPL-MCNC: 0.12 MG/DL (ref 0.2–1)
BILIRUB UR QL STRIP: NEGATIVE
BUN SERPL-MCNC: 25 MG/DL (ref 5–25)
CALCIUM SERPL-MCNC: 10 MG/DL (ref 8.3–10.1)
CHLORIDE SERPL-SCNC: 108 MMOL/L (ref 100–108)
CLARITY UR: ABNORMAL
CO2 SERPL-SCNC: 28 MMOL/L (ref 21–32)
COLOR UR: YELLOW
COLOR, POC: NORMAL
CREAT SERPL-MCNC: 0.91 MG/DL (ref 0.6–1.3)
EOSINOPHIL # BLD AUTO: 0.15 THOUSAND/ΜL (ref 0–0.61)
EOSINOPHIL NFR BLD AUTO: 2 % (ref 0–6)
ERYTHROCYTE [DISTWIDTH] IN BLOOD BY AUTOMATED COUNT: 13.2 % (ref 11.6–15.1)
EXT PREG TEST URINE: NEGATIVE
EXT. CONTROL ED NAV: NORMAL
GFR SERPL CREATININE-BSD FRML MDRD: 75 ML/MIN/1.73SQ M
GLUCOSE SERPL-MCNC: 126 MG/DL (ref 65–140)
GLUCOSE UR STRIP-MCNC: NEGATIVE MG/DL
HCT VFR BLD AUTO: 35.5 % (ref 34.8–46.1)
HGB BLD-MCNC: 12 G/DL (ref 11.5–15.4)
HGB UR QL STRIP.AUTO: ABNORMAL
HYALINE CASTS #/AREA URNS LPF: ABNORMAL /LPF
IMM GRANULOCYTES # BLD AUTO: 0.01 THOUSAND/UL (ref 0–0.2)
IMM GRANULOCYTES NFR BLD AUTO: 0 % (ref 0–2)
KETONES UR STRIP-MCNC: NEGATIVE MG/DL
LEUKOCYTE ESTERASE UR QL STRIP: NEGATIVE
LIPASE SERPL-CCNC: 158 U/L (ref 73–393)
LYMPHOCYTES # BLD AUTO: 1.63 THOUSANDS/ΜL (ref 0.6–4.47)
LYMPHOCYTES NFR BLD AUTO: 21 % (ref 14–44)
MCH RBC QN AUTO: 31.1 PG (ref 26.8–34.3)
MCHC RBC AUTO-ENTMCNC: 33.8 G/DL (ref 31.4–37.4)
MCV RBC AUTO: 92 FL (ref 82–98)
MONOCYTES # BLD AUTO: 0.52 THOUSAND/ΜL (ref 0.17–1.22)
MONOCYTES NFR BLD AUTO: 7 % (ref 4–12)
NEUTROPHILS # BLD AUTO: 5.25 THOUSANDS/ΜL (ref 1.85–7.62)
NEUTS SEG NFR BLD AUTO: 69 % (ref 43–75)
NITRITE UR QL STRIP: NEGATIVE
NON-SQ EPI CELLS URNS QL MICRO: ABNORMAL /HPF
NRBC BLD AUTO-RTO: 0 /100 WBCS
PH UR STRIP.AUTO: 7.5 [PH] (ref 4.5–8)
PLATELET # BLD AUTO: 243 THOUSANDS/UL (ref 149–390)
PMV BLD AUTO: 10.5 FL (ref 8.9–12.7)
POTASSIUM SERPL-SCNC: 4.3 MMOL/L (ref 3.5–5.3)
PROT SERPL-MCNC: 7.3 G/DL (ref 6.4–8.2)
PROT UR STRIP-MCNC: NEGATIVE MG/DL
RBC # BLD AUTO: 3.86 MILLION/UL (ref 3.81–5.12)
RBC #/AREA URNS AUTO: ABNORMAL /HPF
SODIUM SERPL-SCNC: 140 MMOL/L (ref 136–145)
SP GR UR STRIP.AUTO: 1.02 (ref 1–1.03)
UROBILINOGEN UR QL STRIP.AUTO: 0.2 E.U./DL
WBC # BLD AUTO: 7.62 THOUSAND/UL (ref 4.31–10.16)
WBC #/AREA URNS AUTO: ABNORMAL /HPF

## 2020-09-30 PROCEDURE — 96376 TX/PRO/DX INJ SAME DRUG ADON: CPT

## 2020-09-30 PROCEDURE — 80053 COMPREHEN METABOLIC PANEL: CPT | Performed by: EMERGENCY MEDICINE

## 2020-09-30 PROCEDURE — 81001 URINALYSIS AUTO W/SCOPE: CPT

## 2020-09-30 PROCEDURE — 99284 EMERGENCY DEPT VISIT MOD MDM: CPT

## 2020-09-30 PROCEDURE — 96374 THER/PROPH/DIAG INJ IV PUSH: CPT

## 2020-09-30 PROCEDURE — 74176 CT ABD & PELVIS W/O CONTRAST: CPT

## 2020-09-30 PROCEDURE — 96372 THER/PROPH/DIAG INJ SC/IM: CPT

## 2020-09-30 PROCEDURE — 85025 COMPLETE CBC W/AUTO DIFF WBC: CPT | Performed by: EMERGENCY MEDICINE

## 2020-09-30 PROCEDURE — G1004 CDSM NDSC: HCPCS

## 2020-09-30 PROCEDURE — 36415 COLL VENOUS BLD VENIPUNCTURE: CPT

## 2020-09-30 PROCEDURE — 99284 EMERGENCY DEPT VISIT MOD MDM: CPT | Performed by: EMERGENCY MEDICINE

## 2020-09-30 PROCEDURE — 81025 URINE PREGNANCY TEST: CPT | Performed by: EMERGENCY MEDICINE

## 2020-09-30 PROCEDURE — 83690 ASSAY OF LIPASE: CPT | Performed by: EMERGENCY MEDICINE

## 2020-09-30 RX ORDER — ACETAMINOPHEN 325 MG/1
650 TABLET ORAL EVERY 6 HOURS PRN
Qty: 42 TABLET | Refills: 0 | Status: SHIPPED | OUTPATIENT
Start: 2020-09-30 | End: 2022-01-27 | Stop reason: ALTCHOICE

## 2020-09-30 RX ORDER — KETOROLAC TROMETHAMINE 30 MG/ML
15 INJECTION, SOLUTION INTRAMUSCULAR; INTRAVENOUS ONCE
Status: COMPLETED | OUTPATIENT
Start: 2020-09-30 | End: 2020-09-30

## 2020-09-30 RX ORDER — IBUPROFEN 800 MG/1
800 TABLET ORAL 3 TIMES DAILY
Qty: 21 TABLET | Refills: 0 | Status: SHIPPED | OUTPATIENT
Start: 2020-09-30 | End: 2022-01-27 | Stop reason: ALTCHOICE

## 2020-09-30 RX ORDER — TAMSULOSIN HYDROCHLORIDE 0.4 MG/1
0.4 CAPSULE ORAL
Qty: 7 CAPSULE | Refills: 0 | Status: SHIPPED | OUTPATIENT
Start: 2020-09-30 | End: 2022-01-27 | Stop reason: ALTCHOICE

## 2020-09-30 RX ORDER — HYDROMORPHONE HCL/PF 1 MG/ML
0.5 SYRINGE (ML) INJECTION ONCE
Status: COMPLETED | OUTPATIENT
Start: 2020-09-30 | End: 2020-09-30

## 2020-09-30 RX ORDER — ACETAMINOPHEN 325 MG/1
975 TABLET ORAL ONCE
Status: COMPLETED | OUTPATIENT
Start: 2020-09-30 | End: 2020-09-30

## 2020-09-30 RX ORDER — OXYCODONE HYDROCHLORIDE 5 MG/1
5 TABLET ORAL EVERY 6 HOURS PRN
Qty: 12 TABLET | Refills: 0 | Status: SHIPPED | OUTPATIENT
Start: 2020-09-30 | End: 2020-10-03

## 2020-09-30 RX ADMIN — ACETAMINOPHEN 975 MG: 325 TABLET, FILM COATED ORAL at 03:27

## 2020-09-30 RX ADMIN — HYDROMORPHONE HYDROCHLORIDE 0.5 MG: 1 INJECTION, SOLUTION INTRAMUSCULAR; INTRAVENOUS; SUBCUTANEOUS at 03:26

## 2020-09-30 RX ADMIN — KETOROLAC TROMETHAMINE 15 MG: 30 INJECTION, SOLUTION INTRAMUSCULAR at 03:26

## 2020-09-30 RX ADMIN — HYDROMORPHONE HYDROCHLORIDE 0.5 MG: 1 INJECTION, SOLUTION INTRAMUSCULAR; INTRAVENOUS; SUBCUTANEOUS at 05:46

## 2022-01-26 ENCOUNTER — HOSPITAL ENCOUNTER (OUTPATIENT)
Dept: RADIOLOGY | Facility: HOSPITAL | Age: 52
Discharge: HOME | End: 2022-01-26
Payer: COMMERCIAL

## 2022-01-26 DIAGNOSIS — R05.3 PERSISTENT COUGH: ICD-10-CM

## 2022-01-26 PROCEDURE — 71046 X-RAY EXAM CHEST 2 VIEWS: CPT

## 2022-01-27 ENCOUNTER — ANNUAL EXAM (OUTPATIENT)
Dept: OBGYN CLINIC | Facility: CLINIC | Age: 52
End: 2022-01-27
Payer: COMMERCIAL

## 2022-01-27 VITALS
DIASTOLIC BLOOD PRESSURE: 78 MMHG | WEIGHT: 160 LBS | HEIGHT: 65 IN | SYSTOLIC BLOOD PRESSURE: 114 MMHG | BODY MASS INDEX: 26.66 KG/M2

## 2022-01-27 DIAGNOSIS — Z12.4 PAP SMEAR FOR CERVICAL CANCER SCREENING: ICD-10-CM

## 2022-01-27 DIAGNOSIS — Z01.419 ENCOUNTER FOR GYNECOLOGICAL EXAMINATION: Primary | ICD-10-CM

## 2022-01-27 DIAGNOSIS — N94.10 DYSPAREUNIA IN FEMALE: ICD-10-CM

## 2022-01-27 DIAGNOSIS — Z12.31 ENCOUNTER FOR SCREENING MAMMOGRAM FOR MALIGNANT NEOPLASM OF BREAST: ICD-10-CM

## 2022-01-27 PROCEDURE — 99396 PREV VISIT EST AGE 40-64: CPT | Performed by: OBSTETRICS & GYNECOLOGY

## 2022-01-27 PROCEDURE — G0476 HPV COMBO ASSAY CA SCREEN: HCPCS | Performed by: OBSTETRICS & GYNECOLOGY

## 2022-01-27 PROCEDURE — G0145 SCR C/V CYTO,THINLAYER,RESCR: HCPCS | Performed by: OBSTETRICS & GYNECOLOGY

## 2022-01-27 NOTE — PROGRESS NOTES
Assessment/Plan:    Normal annual gynecological exam   Pap smear is performed  She is given script for mammogram when due  She is having exquisite pain, and has for many years, at the introital area  There is a palpable fullness here  When reviewing options with her she would like to proceed with a perineoplasty an attempt to remove this area  She is aware it may not improve things  Will get that scheduled for her she will return for an H and P and review / sign  the consent  Otherwise return to office for annual exam in 1 year  Problem List Items Addressed This Visit        Other    Encounter for gynecological examination - Primary    Pap smear for cervical cancer screening    Relevant Orders    Liquid-based pap, screening    HPV High Risk (Completed)    Encounter for screening mammogram for malignant neoplasm of breast    Relevant Orders    Mammo screening bilateral w 3d & cad    Dyspareunia in female            Subjective:      Patient ID: Peggy Laurent is a 54 y o  female  Lacy Prow is here for annual gyn exam - overall well - menses are usually about every other month but this last one slightly lighter - few hot flashes prior to menses - uncomfortable but tolerable - bowels and bladder normal but urinating more -  discomfort with ic - has been for awhile and has burning on the posterior edge with insertion and throughout ic - no help with lubricants or estrace cream - no medical issues - due for mammogram - last pap 2019 normal with neg HPV but would like done today    Reviewed further with the discomfort she has with intercourse after her exam was performed and finding the exquisitely tender area in the lower hymenal area  This is exactly the place that she has so much pain she is unable to tolerate intercourse  We discussed the variety options with her and we also reviewed possibly trying a surgical perineoplasty to remove this area    She has been in pain for so long and is willing at this point to try to proceed with surgery, even though she is aware that it may end up not being improved  We briefly discussed the surgery  She will return for specific H&P and consent to review it further when scheduled        The following portions of the patient's history were reviewed and updated as appropriate:   She  has a past medical history of Hyperlipidemia  She   Patient Active Problem List    Diagnosis Date Noted    Dyspareunia in female 2022    Encounter for gynecological examination 2022    Pap smear for cervical cancer screening 2022    Encounter for screening mammogram for malignant neoplasm of breast 2022     She  has a past surgical history that includes  section and Oophorectomy (Right)  Her family history includes Clotting disorder in her father; Diabetes in her father  She  reports that she has never smoked  She has never used smokeless tobacco  She reports that she does not drink alcohol and does not use drugs  No current outpatient medications on file  No current facility-administered medications for this visit  No current outpatient medications on file prior to visit  No current facility-administered medications on file prior to visit  She has No Known Allergies       Review of Systems   Constitutional: Negative for chills, fatigue, fever and unexpected weight change  HENT: Negative for dental problem, sinus pressure and sinus pain  Eyes: Negative for visual disturbance  Respiratory: Negative for cough, shortness of breath and wheezing  Cardiovascular: Negative for chest pain and leg swelling  Gastrointestinal: Negative for constipation, diarrhea, nausea and vomiting  Genitourinary: Positive for menstrual problem  Negative for urgency  Pain with intercourse   Musculoskeletal: Negative for back pain and joint swelling  Allergic/Immunologic: Negative for environmental allergies     Neurological: Negative for dizziness and headaches  Psychiatric/Behavioral: The patient is not nervous/anxious  Objective:      /78 (BP Location: Left arm, Patient Position: Sitting, Cuff Size: Standard)   Ht 5' 5" (1 651 m)   Wt 72 6 kg (160 lb)   LMP 01/23/2022 (Approximate)   BMI 26 63 kg/m²          Physical Exam  Vitals reviewed  Constitutional:       General: She is not in acute distress  Appearance: Normal appearance  She is not ill-appearing  Comments: Trim young white female    HENT:      Head: Normocephalic and atraumatic  Neck:      Thyroid: No thyromegaly or thyroid tenderness  Cardiovascular:      Rate and Rhythm: Normal rate and regular rhythm  Pulses: Normal pulses  Heart sounds: Normal heart sounds  No murmur heard  Pulmonary:      Effort: Pulmonary effort is normal  No respiratory distress  Breath sounds: Normal breath sounds  No wheezing  Chest:   Breasts: Breasts are symmetrical       Right: Normal  No swelling, inverted nipple, mass, nipple discharge, skin change, tenderness, axillary adenopathy or supraclavicular adenopathy  Left: Normal  No swelling, inverted nipple, mass, nipple discharge, skin change, tenderness, axillary adenopathy or supraclavicular adenopathy  Abdominal:      General: There is no distension  Palpations: There is no mass  Tenderness: There is no abdominal tenderness  There is no right CVA tenderness, left CVA tenderness, guarding or rebound  Hernia: No hernia is present  Genitourinary:     Comments: Normal female, no vulvar or vaginal lesions visible, Poston's and Bartholin glands are grossly normal   Urethra is in the midline and normal appearance  There is a 2 cm firmness that is exquisitely painful on any palpation in the perineal area at about the level of the hymenal ring just inside the vaginal canal    Cervix is grossly normal appearance and Pap smear is taken    Uterus is anterior grossly normal in size, shape and mobility  There are no adnexal masses  The exam is nontender  Rectal exam reveals normal sphincter tone, no palpable masses and stool is guaiac negative  Musculoskeletal:      Cervical back: Neck supple  No muscular tenderness  Lymphadenopathy:      Upper Body:      Right upper body: No supraclavicular, axillary or pectoral adenopathy  Left upper body: No supraclavicular, axillary or pectoral adenopathy  Neurological:      General: No focal deficit present  Mental Status: She is alert and oriented to person, place, and time     Psychiatric:         Mood and Affect: Mood normal          Behavior: Behavior normal

## 2022-01-28 LAB
HPV HR 12 DNA CVX QL NAA+PROBE: NEGATIVE
HPV16 DNA CVX QL NAA+PROBE: NEGATIVE
HPV18 DNA CVX QL NAA+PROBE: NEGATIVE

## 2022-02-01 PROBLEM — N94.10 DYSPAREUNIA IN FEMALE: Status: ACTIVE | Noted: 2022-02-01

## 2022-02-02 LAB
LAB AP GYN PRIMARY INTERPRETATION: NORMAL
Lab: NORMAL

## 2022-02-21 ENCOUNTER — TELEPHONE (OUTPATIENT)
Dept: OBGYN CLINIC | Facility: CLINIC | Age: 52
End: 2022-02-21

## 2022-02-21 NOTE — TELEPHONE ENCOUNTER
Patient called to ask if there is anyway we would be able to do a prior auth or something of that nature to say that the perineoplasty that you recommended for patient Susan Powers to have done during her last annual visit is medically necessary  states that she spoke to her insurance company and since it is deemed as a plastic surgery that they would not cover it because it is not deemed medically necessary

## 2022-02-23 ENCOUNTER — TELEPHONE (OUTPATIENT)
Dept: OBGYN CLINIC | Facility: CLINIC | Age: 52
End: 2022-02-23

## 2022-02-23 ENCOUNTER — PREP FOR PROCEDURE (OUTPATIENT)
Dept: OBGYN CLINIC | Facility: CLINIC | Age: 52
End: 2022-02-23

## 2022-02-23 DIAGNOSIS — N94.10 DYSPAREUNIA IN FEMALE: Primary | ICD-10-CM

## 2022-02-23 NOTE — TELEPHONE ENCOUNTER
I contacted pt's insurance and no prior authorization is needed  I gave them CPT and DX code  I have a reference number also  I will reach out to pt and see about setting a date for surgery as I do have a date set aside for her which pt is aware of

## 2022-02-23 NOTE — TELEPHONE ENCOUNTER
I left pt a message that I checked with her insurance and prior Thyra Lo is not needed  I asked her to call me back to schedule her surgery since I do have a date set aside for her

## 2022-02-23 NOTE — TELEPHONE ENCOUNTER
I will check on this  I have spoken to this pt several times and not once did she mention this to me  I have been trying to get a date for her so let me see about authorization and I will try and get her scheduled

## 2022-02-25 ENCOUNTER — TELEPHONE (OUTPATIENT)
Dept: OBGYN CLINIC | Facility: CLINIC | Age: 52
End: 2022-02-25

## 2022-02-25 NOTE — TELEPHONE ENCOUNTER
need to reschedule Beatrice Coughlin for a later date for H&P as she was coming in too soon  Her surgery is 4/4/2022  She said Thursdays work great for her and I think Dr Marilyn Pichardo said she might be adding hours somewhere  Please call to reschedule her appt    Thanks

## 2022-03-03 ENCOUNTER — TELEPHONE (OUTPATIENT)
Dept: OBGYN CLINIC | Facility: CLINIC | Age: 52
End: 2022-03-03

## 2022-03-03 NOTE — TELEPHONE ENCOUNTER
I didn't take out her pre and post op apts yet  Please let me know if you would like to once the surgery is canceled  Thanks!

## 2022-03-04 NOTE — TELEPHONE ENCOUNTER
I spoke with pt who cancelled her surgery on 4/4/22 for perineoplasty  She wants to put this on hold and will call back to r/s

## 2022-05-05 ENCOUNTER — HOSPITAL ENCOUNTER (EMERGENCY)
Facility: HOSPITAL | Age: 52
Discharge: HOME | End: 2022-05-05
Attending: EMERGENCY MEDICINE
Payer: COMMERCIAL

## 2022-05-05 ENCOUNTER — APPOINTMENT (EMERGENCY)
Dept: RADIOLOGY | Facility: HOSPITAL | Age: 52
End: 2022-05-05
Payer: COMMERCIAL

## 2022-05-05 VITALS
OXYGEN SATURATION: 98 % | DIASTOLIC BLOOD PRESSURE: 88 MMHG | RESPIRATION RATE: 17 BRPM | HEART RATE: 80 BPM | SYSTOLIC BLOOD PRESSURE: 113 MMHG | TEMPERATURE: 98.1 F

## 2022-05-05 DIAGNOSIS — R07.89 ATYPICAL CHEST PAIN: Primary | ICD-10-CM

## 2022-05-05 LAB
ALBUMIN SERPL BCP-MCNC: 4.4 G/DL (ref 3.5–5)
ALP SERPL-CCNC: 64 U/L (ref 34–104)
ALT SERPL W P-5'-P-CCNC: 13 U/L (ref 7–52)
ANION GAP SERPL CALCULATED.3IONS-SCNC: 7 MMOL/L (ref 4–13)
AST SERPL W P-5'-P-CCNC: 15 U/L (ref 13–39)
BASOPHILS # BLD AUTO: 0.04 THOUSANDS/ΜL (ref 0–0.1)
BASOPHILS NFR BLD AUTO: 1 % (ref 0–1)
BILIRUB SERPL-MCNC: 0.34 MG/DL (ref 0.2–1)
BUN SERPL-MCNC: 14 MG/DL (ref 5–25)
CALCIUM SERPL-MCNC: 10.4 MG/DL (ref 8.4–10.2)
CARDIAC TROPONIN I PNL SERPL HS: <2 NG/L
CHLORIDE SERPL-SCNC: 105 MMOL/L (ref 96–108)
CO2 SERPL-SCNC: 26 MMOL/L (ref 21–32)
CREAT SERPL-MCNC: 0.7 MG/DL (ref 0.6–1.3)
EOSINOPHIL # BLD AUTO: 0.14 THOUSAND/ΜL (ref 0–0.61)
EOSINOPHIL NFR BLD AUTO: 3 % (ref 0–6)
ERYTHROCYTE [DISTWIDTH] IN BLOOD BY AUTOMATED COUNT: 12.5 % (ref 11.6–15.1)
GFR SERPL CREATININE-BSD FRML MDRD: 102 ML/MIN/1.73SQ M
GLUCOSE SERPL-MCNC: 85 MG/DL (ref 65–140)
HCT VFR BLD AUTO: 44.6 % (ref 34.8–46.1)
HGB BLD-MCNC: 14.6 G/DL (ref 11.5–15.4)
IMM GRANULOCYTES # BLD AUTO: 0.01 THOUSAND/UL (ref 0–0.2)
IMM GRANULOCYTES NFR BLD AUTO: 0 % (ref 0–2)
LYMPHOCYTES # BLD AUTO: 1.57 THOUSANDS/ΜL (ref 0.6–4.47)
LYMPHOCYTES NFR BLD AUTO: 35 % (ref 14–44)
MCH RBC QN AUTO: 30.2 PG (ref 26.8–34.3)
MCHC RBC AUTO-ENTMCNC: 32.7 G/DL (ref 31.4–37.4)
MCV RBC AUTO: 92 FL (ref 82–98)
MONOCYTES # BLD AUTO: 0.39 THOUSAND/ΜL (ref 0.17–1.22)
MONOCYTES NFR BLD AUTO: 9 % (ref 4–12)
NEUTROPHILS # BLD AUTO: 2.35 THOUSANDS/ΜL (ref 1.85–7.62)
NEUTS SEG NFR BLD AUTO: 52 % (ref 43–75)
NRBC BLD AUTO-RTO: 0 /100 WBCS
PLATELET # BLD AUTO: 276 THOUSANDS/UL (ref 149–390)
PMV BLD AUTO: 10.7 FL (ref 8.9–12.7)
POTASSIUM SERPL-SCNC: 3.8 MMOL/L (ref 3.5–5.3)
PROT SERPL-MCNC: 7.5 G/DL (ref 6.4–8.4)
RBC # BLD AUTO: 4.84 MILLION/UL (ref 3.81–5.12)
SODIUM SERPL-SCNC: 138 MMOL/L (ref 135–147)
WBC # BLD AUTO: 4.5 THOUSAND/UL (ref 4.31–10.16)

## 2022-05-05 PROCEDURE — 71045 X-RAY EXAM CHEST 1 VIEW: CPT

## 2022-05-05 PROCEDURE — 36415 COLL VENOUS BLD VENIPUNCTURE: CPT

## 2022-05-05 PROCEDURE — 99285 EMERGENCY DEPT VISIT HI MDM: CPT | Performed by: EMERGENCY MEDICINE

## 2022-05-05 PROCEDURE — 99285 EMERGENCY DEPT VISIT HI MDM: CPT

## 2022-05-05 PROCEDURE — 85025 COMPLETE CBC W/AUTO DIFF WBC: CPT | Performed by: EMERGENCY MEDICINE

## 2022-05-05 PROCEDURE — 93005 ELECTROCARDIOGRAM TRACING: CPT

## 2022-05-05 PROCEDURE — 84484 ASSAY OF TROPONIN QUANT: CPT | Performed by: EMERGENCY MEDICINE

## 2022-05-05 PROCEDURE — 80053 COMPREHEN METABOLIC PANEL: CPT | Performed by: EMERGENCY MEDICINE

## 2022-05-06 NOTE — ED PROVIDER NOTES
History  Chief Complaint   Patient presents with    Chest Pain     Chest pain ongoing since Monday  Pt also reports dizziness  Pt denies n/v/d  History provided by:  Patient   used: No    81-year-old female history of hyperlipidemia presented for evaluation central chest pain over the last few days --- constant mild intensity, like a tightness  Not specifically worse with exertion  No associated shortness of breath, diaphoresis, nausea, vomiting  She has felt little bit dizzy  Has not prevented her from doing anything  Denies any fever, chills, cough or other infectious symptoms  Plan EKG, labs, re-evaluate  None       Past Medical History:   Diagnosis Date    Hyperlipidemia        Past Surgical History:   Procedure Laterality Date     SECTION      OOPHORECTOMY Right        Family History   Problem Relation Age of Onset    Diabetes Father     Clotting disorder Father      I have reviewed and agree with the history as documented  E-Cigarette/Vaping    E-Cigarette Use Never User      E-Cigarette/Vaping Substances     Social History     Tobacco Use    Smoking status: Never Smoker    Smokeless tobacco: Never Used   Vaping Use    Vaping Use: Never used   Substance Use Topics    Alcohol use: No    Drug use: No       Review of Systems   Constitutional: Negative for activity change, appetite change and diaphoresis  Respiratory: Negative for shortness of breath  Cardiovascular: Positive for chest pain  Negative for leg swelling  Gastrointestinal: Negative for abdominal pain, nausea and vomiting  Musculoskeletal: Negative for back pain and neck pain  Skin: Negative for color change and rash  Neurological: Positive for dizziness  Negative for weakness, light-headedness and headaches  All other systems reviewed and are negative  Physical Exam  Physical Exam  Vitals and nursing note reviewed     Constitutional:       General: She is not in acute distress  Appearance: She is well-developed  She is not ill-appearing or diaphoretic  HENT:      Head: Normocephalic and atraumatic  Neck:      Vascular: No JVD  Cardiovascular:      Rate and Rhythm: Normal rate and regular rhythm  Heart sounds: Normal heart sounds  No murmur heard  Pulmonary:      Effort: Pulmonary effort is normal       Breath sounds: Normal breath sounds  Abdominal:      Palpations: Abdomen is soft  Tenderness: There is no abdominal tenderness  Musculoskeletal:         General: Normal range of motion  Cervical back: Normal range of motion  Right lower leg: No tenderness  No edema  Left lower leg: No tenderness  No edema  Skin:     General: Skin is warm and dry  Neurological:      General: No focal deficit present  Mental Status: She is alert and oriented to person, place, and time  Psychiatric:         Mood and Affect: Mood normal          Behavior: Behavior normal          Vital Signs  ED Triage Vitals   Temperature Pulse Respirations Blood Pressure SpO2   05/05/22 1745 05/05/22 1745 05/05/22 1745 05/05/22 1745 05/05/22 1745   98 1 °F (36 7 °C) 102 20 127/78 100 %      Temp Source Heart Rate Source Patient Position - Orthostatic VS BP Location FiO2 (%)   05/05/22 1745 05/05/22 1745 05/05/22 1945 05/05/22 1945 --   Oral Monitor Sitting Right arm       Pain Score       05/05/22 1745       5           Vitals:    05/05/22 1745 05/05/22 1945 05/05/22 2000 05/05/22 2100   BP: 127/78 121/85 113/73 113/88   Pulse: 102 82 80 80   Patient Position - Orthostatic VS:  Sitting Sitting          Visual Acuity      ED Medications  Medications - No data to display    Diagnostic Studies  Results Reviewed     Procedure Component Value Units Date/Time    [place] draw [[id number]] Collected: 05/05/22 1810    Lab Status: Final result Specimen: Blood from Arm, Right Updated: 05/05/22 2002    Narrative:       The following orders were created for panel order [place] draw   Procedure                               Abnormality         Status                     ---------                               -----------         ------                     [facility][[id number]]                                 Final result                 Please view results for these tests on the individual orders      Comprehensive metabolic panel [[id number]]  (Abnormal) Collected: 05/05/22 1810    Lab Status: Final result Specimen: Blood from Arm, Right Updated: 05/05/22 1921     Sodium 138 mmol/L      Potassium 3 8 mmol/L      Chloride 105 mmol/L      CO2 26 mmol/L      ANION GAP 7 mmol/L      BUN 14 mg/dL      Creatinine 0 70 mg/dL      Glucose 85 mg/dL      Calcium 10 4 mg/dL      AST 15 U/L      ALT 13 U/L      Alkaline Phosphatase 64 U/L      Total Protein 7 5 g/dL      Albumin 4 4 g/dL      Total Bilirubin 0 34 mg/dL      eGFR 102 ml/min/1 73sq m     Narrative:      [place] guidelines for Chronic Kidney Disease (CKD):     Stage 1 with normal or high GFR (GFR > 90 mL/min/1 73 square meters)    Stage 2 Mild CKD (GFR = 60-89 mL/min/1 73 square meters)    Stage 3A Moderate CKD (GFR = 45-59 mL/min/1 73 square meters)    Stage 3B Moderate CKD (GFR = 30-44 mL/min/1 73 square meters)    Stage 4 Severe CKD (GFR = 15-29 mL/min/1 73 square meters)    Stage 5 End Stage CKD (GFR <15 mL/min/1 73 square meters)  Note: GFR calculation is accurate only with a steady state creatinine    HS Troponin 0hr (reflex protocol) [[id number]]  (Normal) Collected: 05/05/22 1810    Lab Status: Final result Specimen: Blood from Arm, Right Updated: 05/05/22 1912     hs TnI 0hr <2 ng/L     CBC and differential [[id number]] Collected: 05/05/22 1810    Lab Status: Final result Specimen: Blood from Arm, Right Updated: 05/05/22 1842     WBC 4 50 Thousand/uL      RBC 4 84 Million/uL      Hemoglobin 14 6 g/dL      Hematocrit 44 6 %      MCV 92 fL      MCH 30 2 pg      MCHC 32 7 g/dL      RDW 12 5 % MPV 10 7 fL      Platelets 965 Thousands/uL      nRBC 0 /100 WBCs      Neutrophils Relative 52 %      Immat GRANS % 0 %      Lymphocytes Relative 35 %      Monocytes Relative 9 %      Eosinophils Relative 3 %      Basophils Relative 1 %      Neutrophils Absolute 2 35 Thousands/µL      Immature Grans Absolute 0 01 Thousand/uL      Lymphocytes Absolute 1 57 Thousands/µL      Monocytes Absolute 0 39 Thousand/µL      Eosinophils Absolute 0 14 Thousand/µL      Basophils Absolute 0 04 Thousands/µL                  XR chest 1 view portable   Final Result by Darwin Pope MD (05/06 0909)      No acute cardiopulmonary disease  Workstation performed: [id number]                    Procedures  ECG 12 Lead Documentation Only    Date/Time: 5/5/2022 8:42 PM  Performed by: Kishore Batres MD  Authorized by: Kishore Batres MD     Indications / Diagnosis:  Chest pain  ECG reviewed by me, the ED Provider: yes    Patient location:  ED  Previous ECG:     Previous ECG:  Compared to current    Comparison ECG info:  1/10/20    Similarity:  No change  Rate:     ECG rate:  97  Rhythm:     Rhythm: sinus rhythm    Ectopy:     Ectopy: none    QRS:     QRS axis:  Normal  Conduction:     Conduction: normal    ST segments:     ST segments:  Normal  T waves:     T waves: normal               ED Course             HEART Risk Score    Flowsheet Row Most Recent Value   Heart Score Risk Calculator    History 1 Filed at: 05/05/2022 2057   ECG 0 Filed at: 05/05/2022 2057   Age 1 Filed at: 05/05/2022 2057   Risk Factors 1 Filed at: 05/05/2022 2057   Troponin 0 Filed at: 05/05/2022 2057   HEART Score 3 Filed at: 05/05/2022 2057                                      MDM  Number of Diagnoses or Management Options  Atypical chest pain: new and requires workup  Diagnosis management comments: 28-year-old female with some central chest tightness over the last few days presented for evaluation    Workup unremarkable here including labs, EKG, chest x-ray  Given her age, history of hyperlipidemia will refer for cardiac stress testing  Advised to return to the ED if symptoms worsen  Amount and/or Complexity of Data Reviewed  Clinical lab tests: ordered and reviewed  Tests in the radiology section of CPT®: ordered and reviewed  Independent visualization of images, tracings, or specimens: yes    Patient Progress  Patient progress: stable      Disposition  Final diagnoses:   Atypical chest pain     Time reflects when diagnosis was documented in both MDM as applicable and the Disposition within this note     Time User Action Codes Description Comment    5/5/2022  9:04 PM Tim Torres Add [R07 89] Atypical chest pain       ED Disposition     ED Disposition   Discharge    Condition   Stable    Date/Time   Thu May 5, 2022  9:04 PM    Comment   Gianluca Hutchison discharge to home/self care  Follow-up Information     Follow up With Specialties Details Why Contact Info Additional Information    Carmencita Edward MD Internal Medicine   [address]  [address] Emergency Department Emergency Medicine  If symptoms worsen [address] Emergency Department, [address], [place], [address]          There are no discharge medications for this patient  Outpatient Discharge Orders   Stress test only, exercise   Standing Status: Future Number of Occurrences: 1 Standing Exp   Date: 05/05/26       PDMP Review     None          ED Provider  Electronically Signed by           Julieta Durán MD  05/26/22 [phone]

## 2022-05-06 NOTE — ED NOTES
Attempted second troponin with iv start  Pt started screaming with stick, Dr diaz made aware  Repeat troponin cancelled        Hansel Gordon RN  05/05/22 2020

## 2022-05-08 LAB
ATRIAL RATE: 101 BPM
P AXIS: 60 DEGREES
PR INTERVAL: 136 MS
QRS AXIS: 55 DEGREES
QRSD INTERVAL: 82 MS
QT INTERVAL: 302 MS
QTC INTERVAL: 384 MS
T WAVE AXIS: 60 DEGREES
VENTRICULAR RATE: 97 BPM

## 2022-05-08 PROCEDURE — 93010 ELECTROCARDIOGRAM REPORT: CPT | Performed by: INTERNAL MEDICINE

## 2022-05-12 ENCOUNTER — HOSPITAL ENCOUNTER (OUTPATIENT)
Dept: NON INVASIVE DIAGNOSTICS | Facility: HOSPITAL | Age: 52
Discharge: HOME | End: 2022-05-12
Payer: COMMERCIAL

## 2022-05-12 VITALS
OXYGEN SATURATION: 99 % | HEIGHT: 65 IN | BODY MASS INDEX: 26.66 KG/M2 | SYSTOLIC BLOOD PRESSURE: 110 MMHG | WEIGHT: 160 LBS | DIASTOLIC BLOOD PRESSURE: 76 MMHG | HEART RATE: 86 BPM

## 2022-05-12 DIAGNOSIS — R07.89 ATYPICAL CHEST PAIN: ICD-10-CM

## 2022-05-12 LAB
BASELINE ST DEPRESSION: 0 MM
MAX HR PERCENT: 111 %
MAX HR: 190 BPM
RATE PRESSURE PRODUCT: NORMAL
SL CV STRESS RECOVERY BP: NORMAL MMHG
SL CV STRESS RECOVERY HR: 110 BPM
SL CV STRESS RECOVERY O2 SAT: 99 %
SL CV STRESS STAGE REACHED: 4
STRESS ANGINA INDEX: 0
STRESS BASELINE BP: NORMAL MMHG
STRESS BASELINE HR: 86 BPM
STRESS O2 SAT REST: 99 %
STRESS PEAK HR: 190 BPM
STRESS PERCENT HR: 111 %
STRESS POST ESTIMATED WORKLOAD: 10.1 METS
STRESS POST EXERCISE DUR MIN: 9 MIN
STRESS POST EXERCISE DUR SEC: 1 SEC
STRESS POST O2 SAT PEAK: 100 %
STRESS POST PEAK BP: 168 MMHG
STRESS TARGET HR: 190 BPM

## 2022-05-12 PROCEDURE — 93016 CV STRESS TEST SUPVJ ONLY: CPT | Performed by: INTERNAL MEDICINE

## 2022-05-12 PROCEDURE — 93018 CV STRESS TEST I&R ONLY: CPT | Performed by: INTERNAL MEDICINE

## 2022-05-12 PROCEDURE — 93017 CV STRESS TEST TRACING ONLY: CPT

## 2022-05-13 LAB
CHEST PAIN STATEMENT: NORMAL
MAX DIASTOLIC BP: 88 MMHG
MAX HEART RATE: 190 BPM
MAX PREDICTED HEART RATE: 171 BPM
MAX. SYSTOLIC BP: 176 MMHG
PROTOCOL NAME: NORMAL
TARGET HR FORMULA: NORMAL
TEST INDICATION: NORMAL
TIME IN EXERCISE PHASE: NORMAL

## 2022-06-30 ENCOUNTER — HOSPITAL ENCOUNTER (OUTPATIENT)
Dept: MAMMOGRAPHY | Facility: HOSPITAL | Age: 52
Discharge: HOME | End: 2022-06-30
Payer: COMMERCIAL

## 2022-06-30 VITALS — BODY MASS INDEX: 26.67 KG/M2 | WEIGHT: 160.05 LBS | HEIGHT: 65 IN

## 2022-06-30 DIAGNOSIS — Z12.31 ENCOUNTER FOR SCREENING MAMMOGRAM FOR MALIGNANT NEOPLASM OF BREAST: ICD-10-CM

## 2022-06-30 PROCEDURE — 77063 BREAST TOMOSYNTHESIS BI: CPT

## 2022-06-30 PROCEDURE — 77067 SCR MAMMO BI INCL CAD: CPT

## 2022-07-28 ENCOUNTER — HOSPITAL ENCOUNTER (OUTPATIENT)
Dept: ULTRASOUND IMAGING | Facility: HOSPITAL | Age: 52
Discharge: HOME | End: 2022-07-28
Payer: COMMERCIAL

## 2022-07-28 DIAGNOSIS — E04.1 THYROID NODULE: ICD-10-CM

## 2022-07-28 PROCEDURE — 76536 US EXAM OF HEAD AND NECK: CPT

## 2022-09-01 ENCOUNTER — TELEPHONE (OUTPATIENT)
Dept: OBGYN CLINIC | Facility: CLINIC | Age: 52
End: 2022-09-01

## 2022-09-01 NOTE — TELEPHONE ENCOUNTER
Patient is going through perimenopause and wants to know what options are out there to help her without causing weight gain  She wasn't sure if she needs to be seen by a doctor or if theres something we can recommend for her  Thank you

## 2022-09-08 ENCOUNTER — OFFICE VISIT (OUTPATIENT)
Dept: FAMILY MEDICINE CLINIC | Facility: CLINIC | Age: 52
End: 2022-09-08
Payer: COMMERCIAL

## 2022-09-08 VITALS — WEIGHT: 143 LBS | HEART RATE: 72 BPM | BODY MASS INDEX: 23.82 KG/M2 | OXYGEN SATURATION: 98 % | HEIGHT: 65 IN

## 2022-09-08 DIAGNOSIS — E04.1 THYROID NODULE: ICD-10-CM

## 2022-09-08 DIAGNOSIS — G43.009 MIGRAINE WITHOUT AURA AND WITHOUT STATUS MIGRAINOSUS, NOT INTRACTABLE: ICD-10-CM

## 2022-09-08 DIAGNOSIS — E04.2 MULTIPLE THYROID NODULES: ICD-10-CM

## 2022-09-08 DIAGNOSIS — E83.52 HYPERCALCEMIA: Primary | ICD-10-CM

## 2022-09-08 PROCEDURE — 99214 OFFICE O/P EST MOD 30 MIN: CPT | Performed by: INTERNAL MEDICINE

## 2022-09-08 NOTE — ASSESSMENT & PLAN NOTE
Patient with hypercalcemia and elevated PTH levels  She is going to be followed by the endocrinologist soon  No symptoms of urinary tract infection renal calculi

## 2022-09-08 NOTE — ASSESSMENT & PLAN NOTE
Patient with thyroid nodules especially on the left side confirmed with ultrasound who have recommended biopsy for the same  Patient denies difficulty with swallowing  No increased tired feeling mild weight gain  Will get ultrasound-guided biopsy of the thyroid nodule    Will also get complete blood work done prior to the next visit

## 2022-09-08 NOTE — PROGRESS NOTES
Office Visit Note  22     Oz Alcazar 54 y o  female MRN: [id number]  : 1970    Assessment:     1  Hypercalcemia  Assessment & Plan:  Patient with hypercalcemia and elevated PTH levels  She is going to be followed by the endocrinologist soon  No symptoms of urinary tract infection renal calculi  2  Thyroid nodule  -     US guided thyroid biopsy; Future; Expected date: 2022    3  Migraine without aura and without status migrainosus, not intractable    4  Multiple thyroid nodules  Assessment & Plan:  Patient with thyroid nodules especially on the left side confirmed with ultrasound who have recommended biopsy for the same  Patient denies difficulty with swallowing  No increased tired feeling mild weight gain  Will get ultrasound-guided biopsy of the thyroid nodule  Will also get complete blood work done prior to the next visit            Discussion Summary and Plan: Today's care plan and medications were reviewed with patient in detail and all their questions answered to their satisfaction  Chief Complaint   Patient presents with    Follow-up      Subjective:  Patient is here for a follow-up regarding hypercalcemia and thyroid nodules did not have the biopsy done yet  Denies headache dizziness chest pain palpitations shortness of breath no urinary symptoms  The following portions of the patient's history were reviewed and updated as appropriate: allergies, current medications, past family history, past medical history, past social history, past surgical history and problem list     Review of Systems   Constitutional: Negative for activity change  Respiratory: Negative for choking, chest tightness and shortness of breath  Gastrointestinal: Negative  Neurological: Positive for weakness and headaches  Hematological: Negative  Psychiatric/Behavioral: Negative            Historical Information   Patient Active Problem List   Diagnosis    Encounter for gynecological examination    Pap smear for cervical cancer screening    Encounter for screening mammogram for malignant neoplasm of breast    Dyspareunia in female    Hypercalcemia    Multiple thyroid nodules    Migraine without aura and without status migrainosus, not intractable     Past Medical History:   Diagnosis Date    Hyperlipidemia      Past Surgical History:   Procedure Laterality Date     SECTION      OOPHORECTOMY Right      Social History     Substance and Sexual Activity   Alcohol Use No     Social History     Substance and Sexual Activity   Drug Use No     Social History     Tobacco Use   Smoking Status Never Smoker   Smokeless Tobacco Never Used     Family History   Problem Relation Age of Onset    Cancer Mother 72        Unknown type    Diabetes Father     Clotting disorder Father     Lung cancer Father 58        mesothylioma    No Known Problems Sister     No Known Problems Sister     No Known Problems Brother      Health Maintenance Due   Topic    Hepatitis C Screening     COVID-19 Vaccine (1)    Depression Screening     HIV Screening     DTaP,Tdap,and Td Vaccines (1 - Tdap)    Colorectal Cancer Screening     Influenza Vaccine (1)      Meds/Allergies     No current outpatient medications on file  Objective:    Vitals:   Pulse 72   Ht 5' 5" (1 651 m)   Wt 64 9 kg (143 lb)   LMP 2022 (Approximate) Comment: spotting  SpO2 98%   BMI 23 80 kg/m²   Body mass index is 23 8 kg/m²  Vitals:    22 1323   Weight: 64 9 kg (143 lb)       Physical Exam  Vitals and nursing note reviewed  Constitutional:       Appearance: Normal appearance  Cardiovascular:      Rate and Rhythm: Normal rate and regular rhythm  Heart sounds: Normal heart sounds  Pulmonary:      Effort: Pulmonary effort is normal       Breath sounds: Normal breath sounds  Musculoskeletal:      Cervical back: Normal range of motion and neck supple  Right lower leg: No edema  Left lower leg: No edema  Neurological:      Mental Status: She is alert  Lab Review   No visits with results within 2 Month(s) from this visit  Latest known visit with results is:   Hospital Outpatient Visit on 05/12/2022   Component Date Value Ref Range Status    Baseline HR 05/12/2022 86  bpm Final    Baseline BP 05/12/2022 110/76  mmHg Final    O2 sat rest 05/12/2022 99  % Final    Stress peak HR 05/12/2022 190  bpm Final    Post peak BP 05/12/2022 168  mmHg Final    Rate Pressure Product 05/12/2022 31,920 0   Final    O2 sat peak 05/12/2022 100  % Final    Recovery HR 05/12/2022 110  bpm Final    Recovery BP 05/12/2022 118/54  mmHg Final    O2 sat recovery 05/12/2022 99  % Final    Target HR 05/12/2022 190  bpm Final    Percent HR 05/12/2022 111  % Final    Exercise duration (min) 05/12/2022 9  min Final    Exercise duration (sec) 05/12/2022 1  sec Final    Estimated workload 05/12/2022 10 1  METS Final    Angina Index 05/12/2022 0   Final    Max HR Percent 05/12/2022 111  % Final    Stress Stage Reached 05/12/2022 4 0   Final    Max HR 05/12/2022 190  bpm Final    Base ST Depresion (mm) 05/12/2022 0  mm Final    Protocol Name 05/12/2022 MARIBEL   Final    Time In Exercise Phase 05/12/2022 00:09:01   Final    MAX  SYSTOLIC BP 69/28/8263 806  mmHg Final    Max Diastolic Bp 32/09/7179 88  mmHg Final    Max Heart Rate 05/12/2022 190  BPM Final    Max Predicted Heart Rate 05/12/2022 171  BPM Final    Reason for Termination 05/12/2022    Final                    Value:Fatigue  Chest discomfort  Dyspnea      Test Indication 05/12/2022 CHEST PAIN   Final    Target Hr Formular 05/12/2022 (220 - Age)*100%   Final    Chest Pain Statement 05/12/2022 non-limiting   Final         Levy Lancaster MD        "This note has been constructed using a voice recognition system  Therefore there may be syntax, spelling, and/or grammatical errors   Please call if you have any questions  "

## 2022-09-09 NOTE — NURSING NOTE
Call placed to patient to discuss upcoming appointment at [facility] radiology department and complete consultation with patient  Patient is having a thyroid biopsy utilizing US guidance  Reviewed patient's allergies, no current anticoagulant medication present, also discussed the pre and post procedure expectations  Reminded patient of location and time expected for procedure, Patient expressed understanding by verbalizing and repeating instructions

## 2022-09-22 ENCOUNTER — HOSPITAL ENCOUNTER (OUTPATIENT)
Dept: RADIOLOGY | Facility: HOSPITAL | Age: 52
Discharge: HOME | End: 2022-09-22

## 2022-10-06 NOTE — NURSING NOTE
Call placed to patient to discuss upcoming appointment at [facility] radiology department and complete consultation with patient  Patient is having a thyroid biopsy  utilizing  US guidance  Reviewed patient's allergies, no current anticoagulant medication present , also discussed the pre and post procedure expectations  Reminded patient of location and time expected for procedure, Patient expressed understanding by verbalizing and repeating instructions

## 2022-10-07 ENCOUNTER — OFFICE VISIT (OUTPATIENT)
Dept: ENDOCRINOLOGY | Facility: CLINIC | Age: 52
End: 2022-10-07
Payer: COMMERCIAL

## 2022-10-07 VITALS
SYSTOLIC BLOOD PRESSURE: 110 MMHG | HEART RATE: 71 BPM | OXYGEN SATURATION: 99 % | BODY MASS INDEX: 24.09 KG/M2 | HEIGHT: 65 IN | WEIGHT: 144.6 LBS | DIASTOLIC BLOOD PRESSURE: 78 MMHG

## 2022-10-07 DIAGNOSIS — E04.2 MULTIPLE THYROID NODULES: ICD-10-CM

## 2022-10-07 DIAGNOSIS — E55.9 VITAMIN D DEFICIENCY: ICD-10-CM

## 2022-10-07 DIAGNOSIS — E83.52 HYPERCALCEMIA: ICD-10-CM

## 2022-10-07 DIAGNOSIS — E21.3 HYPERPARATHYROIDISM (HCC): Primary | ICD-10-CM

## 2022-10-07 PROCEDURE — 99245 OFF/OP CONSLTJ NEW/EST HI 55: CPT | Performed by: INTERNAL MEDICINE

## 2022-10-07 NOTE — PATIENT INSTRUCTIONS
Please make sure that she is getting calcium at least 6106-7746 mg daily in diet or supplementations  Start vitamin D3 2000 iu daily     On the day urine collections discard the 1st urine in the morning when you wake up then collect all day and all night next morning when you wake up collect the 1st urine in the morning and then take the container to the lab and have labs drawn the same day  Foods that are high in calcium: The following list shows the number of calcium milligrams (mg) per serving  Your dietitian or healthcare provider can help you create a balanced meal plan for your calcium needs  Dairy:       1 cup of low-fat plain yogurt (415 mg) or low-fat fruit yogurt (245 to 384 mg)     1½ ounces of shredded cheddar cheese (306 mg) or part skim mozzarella cheese (275 mg)     1 cup of skim, 2%, or whole milk (300 mg)     1 cup of cottage cheese made with 2% milk fat (138 mg)     ½ cup of frozen yogurt (103 mg)     Other foods:       1 cup of calcium-fortified orange juice (300 mg)     ½ cup of cooked yelitza greens (220 mg)     4 canned sardines, with bones (242 mg)     ½ cup of tofu (with added calcium) (204 mg)        How to get extra calcium:   Add powdered milk to puddings, cocoa, custard, or hot cereal      Sift powdered milk into flour when you make cakes, cookies, or breads  Use low-fat or fat-free milk instead of water in pancake mix, mashed potatoes, pudding, or hot breakfast cereal      Add low-fat or fat-free cheese to salad, soup, or pasta  Add tofu (with added calcium) to vegetable stir-braden  Take calcium supplements if you cannot get enough calcium from the foods you eat  Your body can absorb the most calcium from supplements when you take 500 mg or less at one time  Do not take more than 2,500 mg of calcium supplements each day

## 2022-10-07 NOTE — ASSESSMENT & PLAN NOTE
Likely primary hyperparathyroidism-for now I have advised her to make sure she is getting calcium 1200 mg daily in her diet  Restart vitamin D3 2000 International Units daily  She will have 24 hour urine calcium and creatinine checked next week-repeat labs at bedtime as well    Once primary hyperparathyroidism is confirmed will set her up for imaging studies

## 2022-10-07 NOTE — PROGRESS NOTES
Mary Torres 54 y o  female MRN: [id number]    Encounter: [id number]      Assessment/Plan     Problem List Items Addressed This Visit        Endocrine    Hyperparathyroidism [facility]) - Primary     Likely primary hyperparathyroidism-for now I have advised her to make sure she is getting calcium 1200 mg daily in her diet  Restart vitamin D3 2000 International Units daily  She will have 24 hour urine calcium and creatinine checked next week-repeat labs at bedtime as well  Once primary hyperparathyroidism is confirmed will set her up for imaging studies         Relevant Orders    PTH, intact Lab Collect Lab Collect    Phosphorus Lab Collect    Vitamin D 25 hydroxy Lab Collect    Creatinine, urine, 24 hour Lab Collect    Calcium, urine, 24 hour Lab Collect    Basic metabolic panel Lab Collect    Albumin Lab Collect    Calcium, ionized    DXA bone density spine hip and pelvis    Multiple thyroid nodules     Fine-needle aspiration biopsy scheduled next week         Relevant Orders    T4, free Lab Collect    TSH, 3rd generation Lab Collect       Other    Hypercalcemia    Relevant Orders    PTH, intact Lab Collect Lab Collect    DXA bone density spine hip and pelvis    Vitamin D deficiency     Stopped supplementations a few months back-will check vitamin-D 25 hydroxy             CC:   hyperparathyroidism    History of Present Illness     HPI:  53-year-old female referred here for evaluation of hyperparathyroidism hypercalcemia  Noted to have mild hypercalcemia in labs done in July prior to that has had calcium up to 10 in the past couple of years  No polyuria , polydipsia   Kidney stones spt 2020  No low trauma fractures or dental issues   Was taking vitamin D3 2000 iu daily and stopped in July  No calcium supplements ,   Biotin , vitamin A  No FH of hypercalcemia   No history of GERD/reflux   Complains of myalgias  Worsening fatigue for the past few months - gained 20 lbs last year and lost the weight after dietary modifications     Takes vitamin B6 AND B12      Recent thyroid ultrasound showed bilateral thyroid nodules and she will be undergoing a left nodule Biopsy next Thursday  , no obstructive symptoms     No FH of thyroid cancer ,  No RT to neck    Review of Systems   Constitutional: Positive for fatigue  Gastrointestinal: Negative for constipation, diarrhea, nausea and vomiting  Genitourinary: Positive for menstrual problem  Musculoskeletal: Positive for myalgias  Psychiatric/Behavioral: Positive for sleep disturbance  Historical Information   Past Medical History:   Diagnosis Date    Hyperlipidemia      Past Surgical History:   Procedure Laterality Date     SECTION      OOPHORECTOMY Right      Social History   Social History     Substance and Sexual Activity   Alcohol Use No     Social History     Substance and Sexual Activity   Drug Use No     Social History     Tobacco Use   Smoking Status Never Smoker   Smokeless Tobacco Never Used     Family History:   Family History   Problem Relation Age of Onset    Cancer Mother 72        Unknown type    Diabetes type II Father     Diabetes Father     Clotting disorder Father     Lung cancer Father 58        mesothylioma    No Known Problems Sister     No Known Problems Sister     No Known Problems Brother        Meds/Allergies   No current outpatient medications on file  No current facility-administered medications for this visit  No Known Allergies    Objective   Vitals: Blood pressure 110/78, pulse 71, height 5' 5" (1 651 m), weight 65 6 kg (144 lb 9 6 oz), last menstrual period 2022, SpO2 99 %  Physical Exam  Vitals reviewed  Constitutional:       Appearance: Normal appearance  She is not ill-appearing or diaphoretic  HENT:      Head: Normocephalic and atraumatic  Eyes:      General: No scleral icterus  Extraocular Movements: Extraocular movements intact     Cardiovascular:      Rate and Rhythm: Normal rate and regular rhythm  Heart sounds: Normal heart sounds  No murmur heard  Pulmonary:      Effort: Pulmonary effort is normal  No respiratory distress  Breath sounds: Normal breath sounds  No wheezing  Abdominal:      General: There is no distension  Palpations: Abdomen is soft  Tenderness: There is no abdominal tenderness  There is no guarding  Musculoskeletal:      Cervical back: Neck supple  Right lower leg: No edema  Left lower leg: No edema  Lymphadenopathy:      Cervical: No cervical adenopathy  Skin:     General: Skin is warm and dry  Neurological:      General: No focal deficit present  Mental Status: She is alert and oriented to person, place, and time  Psychiatric:         Mood and Affect: Mood normal          Behavior: Behavior normal          Thought Content: Thought content normal          Judgment: Judgment normal          The history was obtained from the review of the chart, patient      Lab Results:   Lab Results   Component Value Date/Time    Potassium 3 8 05/05/2022 06:10 PM    Chloride 105 05/05/2022 06:10 PM    CO2 26 05/05/2022 06:10 PM    BUN 14 05/05/2022 06:10 PM    Creatinine 0 70 05/05/2022 06:10 PM    Calcium 10 4 (H) 05/05/2022 06:10 PM    eGFR 102 05/05/2022 06:10 PM     PTH- 106    Contains abnormal data Comprehensive metabolic panel  Order: [id number]   Status: Final result     Visible to patient: Yes (seen)     Next appt: 10/13/2022 at 10:30 AM in Radiology ([facility] US 1)     0 Result Notes    Component Ref Range & Units 5/5/22  6:10 PM 9/30/20  2:48 AM 1/10/20  8:22 PM 11/16/19 11:26 PM 10/12/17  9:36 AM 9/21/14  4:44 AM 9/16/14  1:31 PM   Sodium 135 - 147 mmol/L 138  140 R  142 R  141 R  138 R  137 R  137 R    Potassium 3 5 - 5 3 mmol/L 3 8  4 3  3 9  3 8  4 1  4 3  3 9    Chloride 96 - 108 mmol/L 105  108 R  107 R  108 R  105 R  106 R  102 R    CO2 21 - 32 mmol/L 26  28  31  28  28  28 R  30 R    ANION GAP 4 - 13 mmol/L 7  4  4  5  5  3 Low   5 BUN 5 - 25 mg/dL 14  25  18  14  15  15  10    Creatinine 0 60 - 1 30 mg/dL 0 70  0 91 CM  0 58 Low  CM  0 61 CM  0 63 CM  0 59 Low  CM  0 45 Low  CM    Comment: Standardized to IDMS reference method   Glucose 65 - 140 mg/dL 85  126 CM  94 CM  95 CM   106 CM  91 CM    Comment: If the patient is fasting, the ADA then defines impaired fasting glucose as > 100 mg/dL and diabetes as > or equal to 123 mg/dL  Specimen collection should occur prior to Sulfasalazine administration due to the potential for falsely depressed results  Specimen collection should occur prior to Sulfapyridine administration due to the potential for falsely elevated results  Calcium 8 4 - 10 2 mg/dL 10 4 High   10 0 R  9 9 R  9 8 R  9 8 R  10 0 R  9 8 R    AST 13 - 39 U/L 15  18 R, CM  18 R, CM  10 R, CM  20 R, CM  14 R  15 R    Comment: Specimen collection should occur prior to Sulfasalazine administration due to the potential for falsely depressed results  ALT 7 - 52 U/L 13  23 R, CM  26 R, CM  21 R, CM  21 R, CM  21 R, CM  25 R, CM    Comment: Specimen collection should occur prior to Sulfasalazine administration due to the potential for falsely depressed results      Alkaline Phosphatase 34 - 104 U/L 64  69 R  73 R  65 R  57 R  98 R  108 R    Total Protein 6 4 - 8 4 g/dL 7 5  7 3 R  7 5 R  8 0 R  8 1 R  7 4 R  7 3 R    Albumin 3 5 - 5 0 g/dL 4 4  3 7  3 8  4 1  4 0  3 2 Low   3 0 Low     Total Bilirubin 0 20 - 1 00 mg/dL 0 34  0 12 Low  CM  0 19 Low   0 23  0 67      eGFR ml/min/1 73sq m 102  75  110  108  109             Imaging Studies:          THYROID ULTRASOUND     INDICATION:    E04 1: Nontoxic single thyroid nodule      COMPARISON:  None     TECHNIQUE:   Ultrasound of the thyroid was performed with a high frequency linear transducer in transverse and sagittal planes including volumetric imaging sweeps as well as traditional still imaging technique      FINDINGS:  Normal homogeneous smooth echotexture      Right lobe: 5 4 x 1 1 x 1  9 cm  Volume 5 4 mL  Left lobe:  5 8 x 1 0 x 1 7 cm  Volume 4 9 mL  Isthmus: 0 1  cm      Nodule #1  Image 55  LEFT lower pole nodule measuring 1 6 x 0 7 x 1 1 cm  COMPOSITION:  2 points, solid or almost completely solid   ECHOGENICITY:  3 points, very hypoechoic  SHAPE:  0 points, wider-than-tall  MARGIN: 0 points, smooth  ECHOGENIC FOCI:  1 point, macrocalcifications  TI-RADS Classification: TR 4 (4-6 points), FNA if > 1 5 cm  Follow if > 1cm      Nodule #2  Image 72  RIGHT upper pole nodule measuring 0 8 x 0 5 x 0 7 cm  COMPOSITION:  1 point, mixed cystic and solid  ECHOGENICITY:  1 point, hyperechoic or isoechoic  SHAPE:  0 points, wider-than-tall  MARGIN: 0 points, smooth  ECHOGENIC FOCI:  0 points, none or large comet-tail artifacts  TI-RADS Classification: TR 2 (2 points), Not suspious  No FNA           IMPRESSION:     The following meet current ACR criteria for recommending ultrasound guided biopsy:  1 6 x 0 7 x 1 1 cm TR 4 nodule lower pole left lobe  Additional nodule upper pole right lobe is considered not suspicious      The study was marked in EPIC for immediate notification  CT renal stone study abdomen pelvis without contrast       IMPRESSION:     Mild left-sided hydronephrosis and hydroureter, the cause of which appears to be a 3 mm calculus at the distal ureter  I have personally reviewed pertinent reports  Portions of the record may have been created with voice recognition software  Occasional wrong word or "sound a like" substitutions may have occurred due to the inherent limitations of voice recognition software  Read the chart carefully and recognize, using context, where substitutions have occurred

## 2022-10-12 PROBLEM — Z01.419 ENCOUNTER FOR GYNECOLOGICAL EXAMINATION: Status: RESOLVED | Noted: 2022-01-27 | Resolved: 2022-10-12

## 2022-10-13 ENCOUNTER — HOSPITAL ENCOUNTER (OUTPATIENT)
Dept: RADIOLOGY | Facility: HOSPITAL | Age: 52
Discharge: HOME | End: 2022-10-13
Payer: COMMERCIAL

## 2022-10-13 DIAGNOSIS — E04.2 MULTIPLE THYROID NODULES: ICD-10-CM

## 2022-10-13 DIAGNOSIS — E04.2 MULTIPLE THYROID NODULES: Primary | ICD-10-CM

## 2022-10-13 PROCEDURE — 88173 CYTOPATH EVAL FNA REPORT: CPT | Performed by: PATHOLOGY

## 2022-10-13 PROCEDURE — 10005 FNA BX W/US GDN 1ST LES: CPT

## 2022-10-13 RX ORDER — LIDOCAINE HYDROCHLORIDE 10 MG/ML
5 INJECTION, SOLUTION EPIDURAL; INFILTRATION; INTRACAUDAL; PERINEURAL ONCE
Status: COMPLETED | OUTPATIENT
Start: 2022-10-13 | End: 2022-10-13

## 2022-10-13 RX ADMIN — LIDOCAINE HYDROCHLORIDE 2 ML: 10 INJECTION, SOLUTION EPIDURAL; INFILTRATION; INTRACAUDAL; PERINEURAL at 11:43

## 2022-10-17 PROCEDURE — 88173 CYTOPATH EVAL FNA REPORT: CPT | Performed by: PATHOLOGY

## 2022-10-18 DIAGNOSIS — E04.2 MULTIPLE THYROID NODULES: Primary | ICD-10-CM

## 2022-10-18 NOTE — RESULT ENCOUNTER NOTE
Please call the patient regarding thyroid nodule biopsy - benign , will repeat thyroid ultrasound next year

## 2022-10-21 LAB — CREAT 24H UR-MRATE: 1.1 G/24 H (ref 0.5–2.15)

## 2022-10-25 LAB
25(OH)D3 SERPL-MCNC: 34 NG/ML (ref 30–100)
ALBUMIN SERPL-MCNC: 4.4 G/DL (ref 3.6–5.1)
BUN SERPL-MCNC: 11 MG/DL (ref 7–25)
BUN/CREAT SERPL: NORMAL (CALC) (ref 6–22)
CA-I SERPL-MCNC: 5.5 MG/DL (ref 4.8–5.6)
CALCIUM SERPL-MCNC: 10.2 MG/DL (ref 8.6–10.2)
CALCIUM SERPL-MCNC: 10.2 MG/DL (ref 8.6–10.2)
CHLORIDE SERPL-SCNC: 103 MMOL/L (ref 98–110)
CO2 SERPL-SCNC: 30 MMOL/L (ref 20–32)
CREAT SERPL-MCNC: 0.64 MG/DL (ref 0.5–0.99)
GFR/BSA.PRED SERPLBLD CYS-BASED-ARV: 108 ML/MIN/1.73M2
GLUCOSE SERPL-MCNC: 69 MG/DL (ref 65–139)
PHOSPHATE SERPL-MCNC: 2.8 MG/DL (ref 2.5–4.5)
POTASSIUM SERPL-SCNC: 4.6 MMOL/L (ref 3.5–5.3)
PTH-INTACT SERPL-MCNC: 148 PG/ML (ref 16–77)
SODIUM SERPL-SCNC: 136 MMOL/L (ref 135–146)
T4 FREE SERPL-MCNC: 1.1 NG/DL (ref 0.8–1.8)
TSH SERPL-ACNC: 1.19 MIU/L

## 2022-10-27 NOTE — RESULT ENCOUNTER NOTE
Please call the patient regarding labs - PTH is high - did she have the 24 hours urine calcium done as yet ?  If not please have it done

## 2022-10-31 LAB
CALCIUM 24H UR-MCNC: 230 MG/24 H (ref 35–250)
CALCIUM PRE 500 MG CA PO UR-SCNC: 8.5 MG/DL
SPECIMEN VOL 24H UR: 2700 ML

## 2022-11-01 NOTE — RESULT ENCOUNTER NOTE
Please call the patient regarding labs -  for the fractional excretion of calcium If the ratio is less than 0 01 that is consistent with Ctra  [place] 84 If the ratio is above 0 02 then its consistent with primary hyperpara   In between these two is gray area - your ratio is 0 013  - so in that gray area    Now it could be if you have had very low calcium intake  I did  advise you to increase your calcium intake to 1200 mg daily however it could be you were not on that amount for a decent amount of time  For now I would suggest making sure that you on taking 1200 mg of calcium daily in diet or supplementations  Continue vitamin-D  Will repeat 24 hour urine calcium and labs in 1 month    In the meantime please have the DEXA scan done as that will give us additional information

## 2022-11-02 DIAGNOSIS — E55.9 VITAMIN D DEFICIENCY: ICD-10-CM

## 2022-11-02 DIAGNOSIS — E21.3 HYPERPARATHYROIDISM (HCC): Primary | ICD-10-CM

## 2022-11-10 ENCOUNTER — OFFICE VISIT (OUTPATIENT)
Dept: OBGYN CLINIC | Facility: CLINIC | Age: 52
End: 2022-11-10

## 2022-11-10 VITALS
WEIGHT: 141 LBS | BODY MASS INDEX: 23.49 KG/M2 | SYSTOLIC BLOOD PRESSURE: 124 MMHG | HEIGHT: 65 IN | DIASTOLIC BLOOD PRESSURE: 80 MMHG

## 2022-11-10 DIAGNOSIS — N94.2 VAGINISMUS: ICD-10-CM

## 2022-11-10 DIAGNOSIS — N95.2 ATROPHIC VAGINITIS: Primary | ICD-10-CM

## 2022-11-10 RX ORDER — ESTRADIOL 0.1 MG/G
1 CREAM VAGINAL 3 TIMES WEEKLY
Qty: 42.5 G | Refills: 3 | Status: SHIPPED | OUTPATIENT
Start: 2022-11-11

## 2022-11-10 NOTE — PROGRESS NOTES
Julieta Haines is a 54 y o   female here for a problem visit  Patient had a painful area in the vaginal opening area that she was considering resection with Dr Emilee Boyd previously  Patient with dyspareunia unable to have intercourse very sensitive to touch has burning pain and discomfort daily  On evaluation there is not any bump or lesion notable at this time the patient does have severe vaginismus and there is a muscular area that is tight and protruding into the vaginal area with exam   Patient reports severe burning there are no erythema no lesions no red spots visible on exam upper vagina seems less sensitive  Patient fully counseled  We also reviewed perimenopause/menopause and that black cohosh does not cause increased weight  Patient prefers not to use medications to treat concerns or issues she is still dealing with some plantar fasciitis but she has corrected cholesterol issues with diet and exercise  Generally reviewed menopause patient is having decreased frequency menses often skipping a few months that time hot flashes and night sweats are worse when skipping menses  Reviewed atrophic vaginitis and starting with vaginal estrogen and lubrication and discussed additional options including but not limited to pelvic physical therapy, gabapentin, lidocaine jelly, muscle relaxers     Gynecologic History  Patient's last menstrual period was 2022 (approximate)  Last Pap:  2022  Results were: normal  Last mammogram:  2022   Results were: normal    Obstetric History  OB History    Para Term  AB Living   2 2 1     1   SAB IAB Ectopic Multiple Live Births           1      # Outcome Date GA Lbr Asad/2nd Weight Sex Delivery Anes PTL Lv   2 Para 91    F CS-Unspec   BAL   1 Term                  The following portions of the patient's history were reviewed and updated as appropriate: allergies, current medications, past family history, past medical history, past social history, past surgical history and problem list     Review of Systems  Review of Systems   Constitutional: Negative  Negative for chills, fatigue, fever and unexpected weight change  HENT: Negative  Negative for dental problem, sinus pressure and sinus pain  Eyes: Negative  Negative for visual disturbance  Respiratory: Negative  Negative for cough, shortness of breath and wheezing  Cardiovascular: Negative  Negative for chest pain and leg swelling  Gastrointestinal: Negative  Negative for constipation, diarrhea, nausea and vomiting  Genitourinary: Negative  Negative for menstrual problem, pelvic pain and urgency  Musculoskeletal: Positive for back pain and joint swelling  Allergic/Immunologic: Negative  Negative for environmental allergies  Neurological: Positive for headaches  Negative for dizziness  Objective     /80 (BP Location: Left arm, Patient Position: Sitting, Cuff Size: Standard)   Ht 5' 5" (1 651 m)   Wt 64 kg (141 lb)   LMP 10/21/2022 Comment: spotting  Breastfeeding Yes   BMI 23 46 kg/m²   General appearance: alert and oriented, in no acute distress  Pelvic: external genitalia normal, no cervical motion tenderness, no adnexal masses or tenderness and Significant atrophic changes and significant vaginismus to even like contact upper vagina seems less affected extreme discomfort on palpation but caliber is not significantly decreased      Assessment  54year-old  with vaginismus and atrophic vaginitis  Counseled to options to treat no current lesion to be excised noted at this time       Plan  Estrace cream counseled on use return for annual or sooner as needed

## 2022-11-11 ENCOUNTER — HOSPITAL ENCOUNTER (OUTPATIENT)
Dept: BONE DENSITY | Facility: CLINIC | Age: 52
End: 2022-11-11

## 2022-11-11 DIAGNOSIS — E21.3 HYPERPARATHYROIDISM (HCC): ICD-10-CM

## 2022-11-11 DIAGNOSIS — E83.52 HYPERCALCEMIA: ICD-10-CM

## 2022-11-14 ENCOUNTER — TELEPHONE (OUTPATIENT)
Dept: ENDOCRINOLOGY | Facility: CLINIC | Age: 52
End: 2022-11-14

## 2022-12-28 ENCOUNTER — TELEPHONE (OUTPATIENT)
Dept: OTHER | Facility: OTHER | Age: 52
End: 2022-12-28

## 2022-12-30 ENCOUNTER — TELEPHONE (OUTPATIENT)
Dept: FAMILY MEDICINE CLINIC | Facility: CLINIC | Age: 52
End: 2022-12-30

## 2022-12-30 DIAGNOSIS — J06.9 UPPER RESPIRATORY TRACT INFECTION, UNSPECIFIED TYPE: Primary | ICD-10-CM

## 2022-12-30 RX ORDER — AZITHROMYCIN 250 MG/1
TABLET, FILM COATED ORAL
Qty: 6 TABLET | Refills: 0 | Status: SHIPPED | OUTPATIENT
Start: 2022-12-30 | End: 2023-01-04 | Stop reason: ALTCHOICE

## 2023-01-04 ENCOUNTER — OFFICE VISIT (OUTPATIENT)
Dept: FAMILY MEDICINE CLINIC | Facility: CLINIC | Age: 53
End: 2023-01-04

## 2023-01-04 VITALS
DIASTOLIC BLOOD PRESSURE: 76 MMHG | BODY MASS INDEX: 23.32 KG/M2 | HEIGHT: 65 IN | OXYGEN SATURATION: 99 % | WEIGHT: 140 LBS | HEART RATE: 75 BPM | SYSTOLIC BLOOD PRESSURE: 126 MMHG

## 2023-01-04 DIAGNOSIS — J06.9 VIRAL UPPER RESPIRATORY TRACT INFECTION: Primary | ICD-10-CM

## 2023-01-04 DIAGNOSIS — E21.3 HYPERPARATHYROIDISM (HCC): ICD-10-CM

## 2023-01-04 DIAGNOSIS — G43.009 MIGRAINE WITHOUT AURA AND WITHOUT STATUS MIGRAINOSUS, NOT INTRACTABLE: ICD-10-CM

## 2023-01-04 DIAGNOSIS — E83.52 HYPERCALCEMIA: ICD-10-CM

## 2023-01-04 NOTE — PROGRESS NOTES
Office Visit Note  23     Basilia Padilla 50 y o  female MRN: [id number]  : 1970    Assessment:     1  Viral upper respiratory tract infection  Assessment & Plan:  Patient with symptoms mentioned in the HPI most likely related to viral syndrome no focal findings on examination we will monitor recommend patient feel more fluids she has had clear liquid diet not to take he can take Tylenol if necessary and then will reevaluate  2  Hyperparathyroidism ([address] )  Assessment & Plan:  Patient with elevated calcium diagnosed with primary hyperparathyroidism currently taking 200 mg of calcium and vitamin D 1000 units daily she also had a biopsy of the thyroid nodule which came back negative  Patient is being followed by the endocrinologist       3  Hypercalcemia    4  Migraine without aura and without status migrainosus, not intractable  Assessment & Plan:  Patient still gets migraine headaches  Recommend patient to take Tylenol as needed          Discussion Summary and Plan: Today's care plan and medications were reviewed with patient in detail and all their questions answered to their satisfaction  Chief Complaint   Patient presents with   • Cold Like Symptoms     Negative covid      Subjective:  Patient is coming in for evaluation regarding symptoms of sore throat for couple of days and then started feeling scratchy feeling like sensation in the throat area and also discomfort feeling in the abdomen her COVID test was negative  He had a course of Zithromax  Patient also started feeling slightly lightheaded but is not stopping him from moving around  Patient also feels nauseous  No history of ulcers patient has taken Advil on Monday and Tuesday for headache  She does not have a headache now  And is unremarkable migraine  Abdomen is soft mild tenderness noted in the epigastric area    Examination is unremarkable no nystagmus noted no orthostatic changes in the blood pressure      The following portions of the patient's history were reviewed and updated as appropriate: allergies, current medications, past family history, past medical history, past social history, past surgical history and problem list     Review of Systems   Constitutional: Negative for chills and fever  HENT: Positive for sore throat  Negative for ear pain  Eyes: Negative for pain and visual disturbance  Respiratory: Negative for cough and shortness of breath  Cardiovascular: Negative for chest pain and palpitations  Gastrointestinal: Negative for abdominal pain and vomiting  Dyspepsia   Genitourinary: Negative for dysuria and hematuria  Musculoskeletal: Negative for arthralgias and back pain  Skin: Negative for color change and rash  Neurological: Positive for light-headedness  Negative for seizures and syncope  All other systems reviewed and are negative          Historical Information   Patient Active Problem List   Diagnosis   • Pap smear for cervical cancer screening   • Encounter for screening mammogram for malignant neoplasm of breast   • Dyspareunia in female   • Hypercalcemia   • Multiple thyroid nodules   • Migraine without aura and without status migrainosus, not intractable   • Vitamin D deficiency   • Hyperparathyroidism ([facility] [address] )   • Viral upper respiratory tract infection     Past Medical History:   Diagnosis Date   • Hyperlipidemia      Past Surgical History:   Procedure Laterality Date   •  SECTION     • OOPHORECTOMY Right    • US GUIDED THYROID BIOPSY  10/13/2022     Social History     Substance and Sexual Activity   Alcohol Use No     Social History     Substance and Sexual Activity   Drug Use No     Social History     Tobacco Use   Smoking Status Never   Smokeless Tobacco Never     Family History   Problem Relation Age of Onset   • Cancer Mother 72        Unknown type   • Diabetes type II Father    • Diabetes Father    • Clotting disorder Father    • Lung cancer Father 58 mesothylioma   • No Known Problems Sister    • No Known Problems Sister    • No Known Problems Brother      Health Maintenance Due   Topic   • Hepatitis C Screening    • Hepatitis B Vaccine (1 of 3 - 3-dose series)   • COVID-19 Vaccine (1)   • Depression Screening    • HIV Screening    • DTaP,Tdap,and Td Vaccines (1 - Tdap)   • Colorectal Cancer Screening    • Influenza Vaccine (1)   • Annual Physical       Meds/Allergies       Current Outpatient Medications:   •  Ascorbic Acid (VITAMIN C PO), Take by mouth, Disp: , Rfl:   •  Cyanocobalamin (VITAMIN B-12 PO), Take by mouth, Disp: , Rfl:   •  Pyridoxine HCl (VITAMIN B-6 PO), Take by mouth, Disp: , Rfl:   •  VITAMIN D PO, Take by mouth, Disp: , Rfl:   •  estradiol (ESTRACE VAGINAL) 0 1 mg/g vaginal cream, Insert 1 g into the vagina 3 (three) times a week (Patient not taking: Reported on 1/4/2023), Disp: 42 5 g, Rfl: 3      Objective:    Vitals:   /76 (BP Location: Right arm, Patient Position: Sitting, Cuff Size: Standard)   Pulse 75   Ht 5' 5" (1 651 m)   Wt 63 5 kg (140 lb)   SpO2 99%   BMI 23 30 kg/m²   Body mass index is 23 3 kg/m²  Vitals:    01/04/23 1542   Weight: 63 5 kg (140 lb)       Physical Exam  Vitals and nursing note reviewed  Constitutional:       Appearance: Normal appearance  Cardiovascular:      Rate and Rhythm: Normal rate and regular rhythm  Heart sounds: Normal heart sounds  Pulmonary:      Effort: Pulmonary effort is normal       Breath sounds: Normal breath sounds  Abdominal:      Palpations: Abdomen is soft  Comments: Tenderness in the epigastric area mild   Musculoskeletal:      Cervical back: Normal range of motion and neck supple  Right lower leg: No edema  Left lower leg: No edema  Neurological:      Mental Status: She is alert and oriented to person, place, and time  Lab Review   No visits with results within 2 Month(s) from this visit     Latest known visit with results is:   Orders Only on 10/28/2022   Component Date Value Ref Range Status   • Urine Volume 10/28/2022 2,700  mL Final   • Calcium, Ur 10/28/2022 8 5  mg/dL Final   • Calcium, 24H Urine 10/28/2022 230  35 - 250 mg/24 h Final    Comment: Reference Range, Low Calcium Diet:            Female: 35 - 200 mg/24 h              Male: 55 - 200 mg/24 h                  Shellie Pack MD        "This note has been constructed using a voice recognition system  Therefore there may be syntax, spelling, and/or grammatical errors   Please call if you have any questions  "

## 2023-01-04 NOTE — ASSESSMENT & PLAN NOTE
Patient with symptoms mentioned in the HPI most likely related to viral syndrome no focal findings on examination we will monitor recommend patient feel more fluids she has had clear liquid diet not to take he can take Tylenol if necessary and then will reevaluate

## 2023-01-04 NOTE — ASSESSMENT & PLAN NOTE
Patient with elevated calcium diagnosed with primary hyperparathyroidism currently taking 200 mg of calcium and vitamin D 1000 units daily she also had a biopsy of the thyroid nodule which came back negative    Patient is being followed by the endocrinologist

## 2023-01-23 LAB
25(OH)D3 SERPL-MCNC: 28 NG/ML (ref 30–100)
ALBUMIN SERPL-MCNC: 4.5 G/DL (ref 3.6–5.1)
BUN SERPL-MCNC: 16 MG/DL (ref 7–25)
BUN/CREAT SERPL: ABNORMAL (CALC) (ref 6–22)
CA-I SERPL-MCNC: 5.5 MG/DL (ref 4.8–5.6)
CALCIUM SERPL-MCNC: 10.7 MG/DL (ref 8.6–10.4)
CALCIUM SERPL-MCNC: 10.7 MG/DL (ref 8.6–10.4)
CHLORIDE SERPL-SCNC: 102 MMOL/L (ref 98–110)
CO2 SERPL-SCNC: 30 MMOL/L (ref 20–32)
CREAT SERPL-MCNC: 0.66 MG/DL (ref 0.5–1.03)
GFR/BSA.PRED SERPLBLD CYS-BASED-ARV: 107 ML/MIN/1.73M2
GLUCOSE SERPL-MCNC: 84 MG/DL (ref 65–99)
PHOSPHATE SERPL-MCNC: 2.6 MG/DL (ref 2.5–4.5)
POTASSIUM SERPL-SCNC: 4.1 MMOL/L (ref 3.5–5.3)
PTH-INTACT SERPL-MCNC: 125 PG/ML (ref 16–77)
SODIUM SERPL-SCNC: 137 MMOL/L (ref 135–146)
T4 FREE SERPL-MCNC: 1 NG/DL (ref 0.8–1.8)
TSH SERPL-ACNC: 1.03 MIU/L

## 2023-01-24 ENCOUNTER — TELEPHONE (OUTPATIENT)
Dept: ENDOCRINOLOGY | Facility: CLINIC | Age: 53
End: 2023-01-24

## 2023-01-24 NOTE — TELEPHONE ENCOUNTER
----- Message from Ant Rainey MD sent at 1/24/2023  9:20 AM EST -----  Please call the patient regarding her abnormal result - please schedule f/u to discuss the testing done so far and discuss next step

## 2023-01-25 LAB
CALCIUM 24H UR-MCNC: 438 MG/24 H (ref 35–250)
CALCIUM PRE 500 MG CA PO UR-SCNC: 14.6 MG/DL
SPECIMEN VOL 24H UR: 3000 ML

## 2023-01-27 ENCOUNTER — OFFICE VISIT (OUTPATIENT)
Dept: ENDOCRINOLOGY | Facility: CLINIC | Age: 53
End: 2023-01-27

## 2023-01-27 VITALS
HEART RATE: 85 BPM | HEIGHT: 65 IN | BODY MASS INDEX: 24.26 KG/M2 | SYSTOLIC BLOOD PRESSURE: 118 MMHG | DIASTOLIC BLOOD PRESSURE: 80 MMHG | WEIGHT: 145.6 LBS

## 2023-01-27 DIAGNOSIS — E55.9 VITAMIN D DEFICIENCY: ICD-10-CM

## 2023-01-27 DIAGNOSIS — E83.52 HYPERCALCEMIA: ICD-10-CM

## 2023-01-27 DIAGNOSIS — E21.3 HYPERPARATHYROIDISM (HCC): Primary | ICD-10-CM

## 2023-01-27 NOTE — ASSESSMENT & PLAN NOTE
Recommend patient continue with 1200 mg of calcium per day recommend calcium citrate for better absorption  Continue with vitamin D supplementation

## 2023-01-27 NOTE — ASSESSMENT & PLAN NOTE
Recommend patient have a parathyroid uptake scan completed  Order placed  Continue with calcium and vitamin D supplementation

## 2023-01-27 NOTE — PROGRESS NOTES
Established Patient Progress Note    CC: Follow-up for primary hyperparathyroidism    History of Present Illness:   Esther Trujillo is a 50 y o  female with a history of primary hyperparathyroidism  Patient had kidney stones in   Nothing since  Patient also reports fatigue  She also states her arms feel heavy after a minute or 2 while she is in the shower washing her hair  This is the only time this occurs  She noticed this a few months ago and it has not worsened  She had a DEXA scan which was normal   She denies any fractures  Patient also has a history of thyroid nodules  She states she feels like the food gets stuck and has difficulty swallowing at times  She had a biopsy of this nodule in the past which was benign      Patient Active Problem List   Diagnosis   • Pap smear for cervical cancer screening   • Encounter for screening mammogram for malignant neoplasm of breast   • Dyspareunia in female   • Hypercalcemia   • Multiple thyroid nodules   • Migraine without aura and without status migrainosus, not intractable   • Vitamin D deficiency   • Hyperparathyroidism ([facility] [address] )   • Viral upper respiratory tract infection      Past Medical History:   Diagnosis Date   • Hyperlipidemia       Past Surgical History:   Procedure Laterality Date   •  SECTION     • OOPHORECTOMY Right    • US GUIDED THYROID BIOPSY  10/13/2022      Family History   Problem Relation Age of Onset   • Cancer Mother 72        Unknown type   • Diabetes type II Father    • Diabetes Father    • Clotting disorder Father    • Lung cancer Father 58        mesothylioma   • No Known Problems Sister    • No Known Problems Sister    • No Known Problems Brother      Social History     Tobacco Use   • Smoking status: Never   • Smokeless tobacco: Never   Substance Use Topics   • Alcohol use: Not Currently     No Known Allergies      Current Outpatient Medications:   •  Ascorbic Acid (VITAMIN C PO), Take by mouth, Disp: , Rfl:   • Cyanocobalamin (VITAMIN B-12 PO), Take by mouth, Disp: , Rfl:   •  Pyridoxine HCl (VITAMIN B-6 PO), Take by mouth, Disp: , Rfl:   •  VITAMIN D PO, Take by mouth, Disp: , Rfl:   •  estradiol (ESTRACE VAGINAL) 0 1 mg/g vaginal cream, Insert 1 g into the vagina 3 (three) times a week (Patient not taking: Reported on 1/4/2023), Disp: 42 5 g, Rfl: 3    Review of Systems   Constitutional: Positive for fatigue  Negative for chills and fever  HENT: Positive for trouble swallowing  Negative for ear pain and sore throat  Feels food gets stuck   Eyes: Negative for pain and visual disturbance  Respiratory: Negative for cough and shortness of breath  Cardiovascular: Negative for chest pain and palpitations  Gastrointestinal: Negative for abdominal pain and vomiting  Genitourinary: Negative for dysuria and hematuria  Musculoskeletal: Positive for myalgias  Negative for arthralgias and back pain  In arms when washing hair- see hpi   Skin: Negative for color change and rash  Neurological: Negative for seizures and syncope  All other systems reviewed and are negative  Physical Exam:  Body mass index is 24 23 kg/m²  /80 (BP Location: Left arm, Patient Position: Sitting, Cuff Size: Standard)   Pulse 85   Ht 5' 5" (1 651 m)   Wt 66 kg (145 lb 9 6 oz)   BMI 24 23 kg/m²    Wt Readings from Last 3 Encounters:   01/27/23 66 kg (145 lb 9 6 oz)   01/04/23 63 5 kg (140 lb)   11/10/22 64 kg (141 lb)       Physical Exam  Constitutional:       Appearance: Normal appearance  HENT:      Head: Normocephalic and atraumatic  Neck:      Thyroid: No thyroid mass or thyromegaly  Trachea: Trachea normal      Cardiovascular:      Rate and Rhythm: Normal rate and regular rhythm  Heart sounds: Normal heart sounds  Pulmonary:      Effort: Pulmonary effort is normal       Breath sounds: Normal breath sounds  Musculoskeletal:      Cervical back: Neck supple  Tenderness present  Lymphadenopathy:      Cervical: No cervical adenopathy  Right cervical: No superficial cervical adenopathy  Left cervical: No superficial cervical adenopathy  Neurological:      Mental Status: She is alert and oriented to person, place, and time  Psychiatric:         Mood and Affect: Mood normal          Behavior: Behavior normal          Labs:   No results found for: HGBA1C  Lab Results   Component Value Date    CREATININE 0 66 01/20/2023    CREATININE 0 64 10/21/2022    CREATININE 0 70 05/05/2022    BUN 16 01/20/2023     09/21/2014    K 4 1 01/20/2023     01/20/2023    CO2 30 01/20/2023     eGFR   Date Value Ref Range Status   01/20/2023 107 > OR = 60 mL/min/1 73m2 Final     Comment:     The eGFR is based on the CKD-EPI 2021 equation  To calculate   the new eGFR from a previous Creatinine or Cystatin C  result, go to Tess at  [email or web address]     05/05/2022 102 ml/min/1 73sq m Final     Lab Results   Component Value Date    HDL 95 (H) 10/12/2017    TRIG 55 10/12/2017     Lab Results   Component Value Date    ALT 13 05/05/2022    AST 15 05/05/2022    ALKPHOS 64 05/05/2022    BILITOT 0 22 09/21/2014     Lab Results   Component Value Date    VAC3QNZNHFFX 1 300 10/12/2017     Lab Results   Component Value Date    FREET4 1 0 01/20/2023       Impression & Plan:    Problem List Items Addressed This Visit        Endocrine    Hyperparathyroidism ([facility] [address] ) - Primary     Recommend patient have a parathyroid uptake scan completed  Order placed  Continue with calcium and vitamin D supplementation  Relevant Orders    NM parathyroid scan w spect       Other    Hypercalcemia     Recommend patient continue with 1200 mg of calcium per day recommend calcium citrate for better absorption  Continue with vitamin D supplementation           Relevant Orders    NM parathyroid scan w spect    Vitamin D deficiency     Continue with 1000 IU/day of vitamin D supplementation            Orders Placed This Encounter   Procedures   • NM parathyroid scan w spect     Standing Status:   Future     Standing Expiration Date:   1/27/2027     Scheduling Instructions:      Please bring your insurance cards, a form of photo ID and a list of your medications with you  Arrive 15 minutes prior to your appointment time in order to register  To schedule this appointment, please contact Central Scheduling at [phone]  Order Specific Question:   Reason for Exam:     Answer:   primary hyperparathyroidism     Order Specific Question:   Is the patient pregnant? Answer:   No       Patient Instructions   Switch to calcium citrate 1200 mg / day  Continue with 800 IU - 1,000 IU of vitamin D        Discussed with the patient and all questioned fully answered  She will call me if any problems arise

## 2023-02-09 ENCOUNTER — TELEPHONE (OUTPATIENT)
Dept: ENDOCRINOLOGY | Facility: CLINIC | Age: 53
End: 2023-02-09

## 2023-02-10 ENCOUNTER — HOSPITAL ENCOUNTER (OUTPATIENT)
Dept: NUCLEAR MEDICINE | Facility: HOSPITAL | Age: 53
Discharge: HOME | End: 2023-02-10

## 2023-02-10 DIAGNOSIS — E83.52 HYPERCALCEMIA: ICD-10-CM

## 2023-02-10 DIAGNOSIS — E21.3 HYPERPARATHYROIDISM (HCC): ICD-10-CM

## 2023-02-16 ENCOUNTER — HOSPITAL ENCOUNTER (OUTPATIENT)
Dept: NUCLEAR MEDICINE | Facility: HOSPITAL | Age: 53
Discharge: HOME | End: 2023-02-16

## 2023-02-16 DIAGNOSIS — E83.52 HYPERCALCEMIA: ICD-10-CM

## 2023-02-16 DIAGNOSIS — E21.3 HYPERPARATHYROIDISM (HCC): ICD-10-CM

## 2023-02-17 DIAGNOSIS — E21.3 HYPERPARATHYROIDISM (HCC): Primary | ICD-10-CM

## 2023-02-17 DIAGNOSIS — E83.52 HYPERCALCEMIA: ICD-10-CM

## 2023-03-05 PROBLEM — J06.9 VIRAL UPPER RESPIRATORY TRACT INFECTION: Status: RESOLVED | Noted: 2023-01-04 | Resolved: 2023-03-05

## 2023-03-14 ENCOUNTER — HOSPITAL ENCOUNTER (OUTPATIENT)
Dept: CT IMAGING | Facility: HOSPITAL | Age: 53
Discharge: HOME | End: 2023-03-14

## 2023-03-14 DIAGNOSIS — E21.3 HYPERPARATHYROIDISM (HCC): ICD-10-CM

## 2023-03-14 DIAGNOSIS — E83.52 HYPERCALCEMIA: ICD-10-CM

## 2023-03-14 RX ADMIN — IOHEXOL 85 ML: 350 INJECTION, SOLUTION INTRAVENOUS at 15:28

## 2023-03-21 DIAGNOSIS — E21.3 HYPERPARATHYROIDISM (HCC): Primary | ICD-10-CM

## 2023-04-26 ENCOUNTER — TELEPHONE (OUTPATIENT)
Dept: SURGICAL ONCOLOGY | Facility: CLINIC | Age: 53
End: 2023-04-26

## 2023-04-26 ENCOUNTER — CONSULT (OUTPATIENT)
Dept: SURGICAL ONCOLOGY | Facility: CLINIC | Age: 53
End: 2023-04-26

## 2023-04-26 VITALS
BODY MASS INDEX: 24.41 KG/M2 | HEIGHT: 65 IN | HEART RATE: 78 BPM | RESPIRATION RATE: 17 BRPM | SYSTOLIC BLOOD PRESSURE: 120 MMHG | DIASTOLIC BLOOD PRESSURE: 84 MMHG | OXYGEN SATURATION: 97 % | WEIGHT: 146.5 LBS | TEMPERATURE: 96.3 F

## 2023-04-26 DIAGNOSIS — E04.2 MULTIPLE THYROID NODULES: ICD-10-CM

## 2023-04-26 DIAGNOSIS — E83.52 HYPERCALCEMIA: ICD-10-CM

## 2023-04-26 DIAGNOSIS — E21.3 HYPERPARATHYROIDISM (HCC): Primary | ICD-10-CM

## 2023-04-26 RX ORDER — TRAMADOL HYDROCHLORIDE 50 MG/1
50 TABLET ORAL EVERY 6 HOURS PRN
Qty: 10 TABLET | Refills: 0 | Status: SHIPPED | OUTPATIENT
Start: 2023-04-26

## 2023-04-26 NOTE — PROGRESS NOTES
Surgical Oncology Consult       [address] [facility] SHANEL [address]  [facility] [address]  Serafin Lara  1970  [id number]  [address] [facility] CANCER [facility] SURGICAL ONCOLOGY Marizolcecilia Johnson  [facility] [id number] [phone]    Chief Complaint   Patient presents with   • Consult       Assessment/Plan:    No problem-specific Assessment & Plan notes found for this encounter  Diagnoses and all orders for this visit:    Hyperparathyroidism [facility])  -     Ambulatory referral to Surgical Oncology    Hypercalcemia    Multiple thyroid nodules        Advance Care Planning/Advance Directives:  Discussed disease status, cancer treatment plans and/or cancer treatment goals with the patient  Oncology History    No history exists  History of Present Illness: Patient is a 37-year-old woman who has had increasing fatigue, depressive type symptoms, bilateral upper extremity aches and pains, and a kidney stone he was found to have hypercalcemia  Further work-up included sestamibi scan and CAT scan which were suggestive of a left-sided parathyroid problem  No personal or family history of endocrine malignancies  Review of Systems   Constitutional: Positive for fatigue  HENT: Negative  Eyes: Negative  Respiratory: Negative  Cardiovascular: Negative  Gastrointestinal: Negative  Negative for abdominal pain, constipation, diarrhea and nausea  Endocrine: Negative  Genitourinary: Negative  Musculoskeletal: Positive for arthralgias and myalgias  Skin: Negative  Allergic/Immunologic: Negative  Neurological: Positive for weakness  Hematological: Negative  Psychiatric/Behavioral: Positive for decreased concentration  The patient is nervous/anxious  All other systems reviewed and are negative          Patient Active Problem List   Diagnosis   • Pap smear for cervical cancer screening   • Encounter for screening mammogram for malignant neoplasm of breast   • Dyspareunia in female   • Hypercalcemia   • Multiple thyroid nodules   • Migraine without aura and without status migrainosus, not intractable   • Vitamin D deficiency   • Hyperparathyroidism ([facility] [address] )     Past Medical History:   Diagnosis Date   • Hyperlipidemia      Past Surgical History:   Procedure Laterality Date   •  SECTION     • OOPHORECTOMY Right    • US GUIDED THYROID BIOPSY  10/13/2022     Family History   Problem Relation Age of Onset   • Cancer Mother 72        Unknown type   • Diabetes type II Father    • Diabetes Father    • Clotting disorder Father    • Lung cancer Father 58        mesothylioma   • No Known Problems Sister    • No Known Problems Sister    • No Known Problems Brother      Social History     Socioeconomic History   • Marital status: /Civil Union     Spouse name: Not on file   • Number of children: Not on file   • Years of education: Not on file   • Highest education level: Not on file   Occupational History   • Not on file   Tobacco Use   • Smoking status: Never   • Smokeless tobacco: Never   Vaping Use   • Vaping Use: Never used   Substance and Sexual Activity   • Alcohol use: Not Currently   • Drug use: Never   • Sexual activity: Yes     Partners: Male   Other Topics Concern   • Not on file   Social History Narrative   • Not on file     Social Determinants of Health     Financial Resource Strain: Not on file   Food Insecurity: Not on file   Transportation Needs: Not on file   Physical Activity: Not on file   Stress: Not on file   Social Connections: Not on file   Intimate Partner Violence: Not on file   Housing Stability: Not on file       Current Outpatient Medications:   •  Ascorbic Acid (VITAMIN C PO), Take by mouth, Disp: , Rfl:   •  Cyanocobalamin (VITAMIN B-12 PO), Take by mouth, Disp: , Rfl:   •  Pyridoxine HCl (VITAMIN B-6 PO), Take by mouth, Disp: , Rfl: •  VITAMIN D PO, Take by mouth, Disp: , Rfl:   •  estradiol (ESTRACE VAGINAL) 0 1 mg/g vaginal cream, Insert 1 g into the vagina 3 (three) times a week (Patient not taking: Reported on 1/4/2023), Disp: 42 5 g, Rfl: 3  No Known Allergies  Vitals:    04/26/23 1051   BP: 120/84   Pulse: 78   Resp: 17   Temp: (!) 96 3 °F (35 7 °C)   SpO2: 97%       Physical Exam  Vitals reviewed  Constitutional:       Appearance: Normal appearance  HENT:      Head: Normocephalic and atraumatic  Eyes:      Extraocular Movements: Extraocular movements intact  Pupils: Pupils are equal, round, and reactive to light  Neck:      Vascular: No carotid bruit  Cardiovascular:      Rate and Rhythm: Normal rate and regular rhythm  Heart sounds: Normal heart sounds  Pulmonary:      Effort: Pulmonary effort is normal       Breath sounds: Normal breath sounds  Abdominal:      General: Abdomen is flat  Palpations: Abdomen is soft  Musculoskeletal:         General: Normal range of motion  Cervical back: Normal range of motion and neck supple  No rigidity or tenderness  Lymphadenopathy:      Cervical: No cervical adenopathy  Skin:     General: Skin is warm and dry  Neurological:      General: No focal deficit present  Mental Status: She is alert     Psychiatric:         Mood and Affect: Mood normal          Behavior: Behavior normal          Pathology:  Case Report   Non-gynecologic Cytology                          Case: [id number]                                   Authorizing Provider: Angélica Juan MD          Collected:           10/13/2022 [facility]               Ordering Location:     [address] [place] [address]      Received:            10/13/2022 [address] [place] [address] Ultrasound                                                           Pathologist:           Argelia Ruiz MD                                                     Specimens:   A) - Thyroid, Left, left lower "pole                                                                  B) - Thyroid, Left, left lower pole                                                        Final Diagnosis   A -B  Thyroid, Left lower (Thin-prep and smear preparations):    Benign ([place] Category II) - See note  Lymphocytes and scant groups of oncocytes/follicle cells  Findings are suggestive of chronic lymphocytic (Hashimoto) thyroiditis in the proper clinical setting      Satisfactory for evaluation      Note: As reported in the [facility] for Reporting Thyroid Cytopathology*, the diagnostic category of \"benign/negative for malignancy\" carries a 0-3% risk of malignancy being found in subsequent resections (in the few studies of patients with benign FNA results that were followed long-term, a false negative rate of 0-3% was reported), with the usual management being clinical follow-up supplemented by ultrasonography (US) as indicated  Repeat FNA may be indicated for nodules showing significant growth or developing US abnormalities   Ultimately, clinical/imaging correlation for this patient is needed in arriving at the actual management plan      *The [place] System for Reporting Thyroid Cytopathology, Lenise Profit , Tommye Stallion [place] [employer] ), 2018 (2nd ed )    Electronically signed by Mariza Wright MD on 10/17/2022 at 10:31 AM         Labs:  Lab Results   Component Value Date    CALCIUM 10 7 (H) 01/20/2023    CALCIUM 10 7 (H) 01/20/2023     Lab Results   Component Value Date    CALCIUM 10 7 (H) 01/20/2023    CALCIUM 10 7 (H) 01/20/2023    1 Follow-up Encounter  Component Ref Range & Units 1/20/23  8:31 AM 1/20/23  8:31 AM 10/21/22 12:12 PM 10/21/22 12:12 PM 5/5/22  6:10 PM 9/30/20  2:48 AM 1/10/20  8:22 PM   PTH, Intact 16 - 77 pg/mL 125 High                 THYROID ULTRASOUND     INDICATION:    E04 1: Nontoxic single thyroid nodule      COMPARISON:  None     TECHNIQUE:   Ultrasound of the thyroid was performed with a high " frequency linear transducer in transverse and sagittal planes including volumetric imaging sweeps as well as traditional still imaging technique      FINDINGS:  Normal homogeneous smooth echotexture      Right lobe: 5 4 x 1 1 x 1 9 cm  Volume 5 4 mL  Left lobe:  5 8 x 1 0 x 1 7 cm  Volume 4 9 mL  Isthmus: 0 1  cm      Nodule #1  Image 55  LEFT lower pole nodule measuring 1 6 x 0 7 x 1 1 cm  COMPOSITION:  2 points, solid or almost completely solid   ECHOGENICITY:  3 points, very hypoechoic  SHAPE:  0 points, wider-than-tall  MARGIN: 0 points, smooth  ECHOGENIC FOCI:  1 point, macrocalcifications  TI-RADS Classification: TR 4 (4-6 points), FNA if > 1 5 cm  Follow if > 1cm      Nodule #2  Image 72  RIGHT upper pole nodule measuring 0 8 x 0 5 x 0 7 cm  COMPOSITION:  1 point, mixed cystic and solid  ECHOGENICITY:  1 point, hyperechoic or isoechoic  SHAPE:  0 points, wider-than-tall  MARGIN: 0 points, smooth  ECHOGENIC FOCI:  0 points, none or large comet-tail artifacts  TI-RADS Classification: TR 2 (2 points), Not suspious  No FNA           IMPRESSION:     The following meet current ACR criteria for recommending ultrasound guided biopsy:  1 6 x 0 7 x 1 1 cm TR 4 nodule lower pole left lobe  Additional nodule upper pole right lobe is considered not suspicious      The study was marked in EPIC for immediate notification            Reference: ACR Thyroid Imaging, Reporting and Data System (TI-RADS): White Paper of the [employer]   J AM Cintia Radiol 0083;[id number]:[id number]  (additional recommendations based on American Thyroid Association 2015 guidelines )        Workstation performed: [id number]      Imaging  PARATHYROID SCAN     INDICATION:  E21 3: Hyperparathyroidism, unspecified  E83 52: Hypercalcemia     COMPARISON:  Thyroid ultrasound 7/28/2022     TECHNIQUE:   Following the intravenous administration of 26 7 mCi Tc-99m Cardiolite, anterior and bilateral anterior oblique projection images of the neck and mediastinum were obtained at approximately 10 minutes post injection followed at 2 hours post   injection by static anterior and bilateral oblique projections as well as SPECT images in coronal, sagittal and axial projections       FINDINGS:     Early planar images demonstrate radiotracer uptake in the thyroid gland  There is focal increased radiotracer uptake at the inferior aspect of the left lobe of the thyroid      Delayed images demonstrate washout of thyroid gland activity  Residual focus of uptake identified in the left lower pole region      SPECT-CT images demonstrate focal uptake in the left lower pole region  Based on prior ultrasound, there was previously a 1 6 x 0 7 x 1 1 cm nodule in this region  No additional foci      IMPRESSION:     1  Focal radiotracer uptake in the left lower pole region would be suspicious for parathyroid adenoma  However this does appear to correspond to the previously biopsied benign thyroid nodule  Thyroid nodules can demonstrate incidental radiotracer   uptake  Correlate with prior tissue sampling results  2   No additional suspicious foci of uptake           CTA  NECK  WITHOUT AND WITH CONTRAST FROM AYLEEN TO SKULL BASE     INDICATION: Hyperparathyroidism      COMPARISON:  Relevant examinations including nuclear medicine parathyroid scan 2/16/2023      TECHNIQUE:  Both noncontrast, contrast, and post contrast delayed images were performed according to standard parathyroid protocol (4D technique) Coronal and sagittal reconstructions were performed  3D reconstructions were performed on an independant   workstation, and are supplied for review  This examination, like all CT scans performed in the [facility], was performed utilizing techniques to minimize radiation dose exposure, including the use of iterative reconstruction and   automated exposure control    Rad dose 1470 mGy-cm      IV Contrast:  85 mL of iohexol (OMNIPAQUE)     FINDINGS:     SERIAL NONCONTRAST, POST CONTRAST AND DELAYS:     CANDIDATE PARATHYROID LESIONS: Candidate lesion #1 a well-circumscribed ovoid mass adjacent to the inferolateral aspect of the lower pole of the left lobe of the thyroid gland measuring approximately 7 x 12 mm in short and long axis diameter series 4   axial image 131 with a maximal cephalocaudad diameter of 14 mm series 602 sagittal image 50  This is lower in attenuation than the thyroid tissue on noncontrast imaging, isoattenuating to thyroid tissue on arterial phase imaging and demonstrates washout   with lower attenuation than the thyroid gland on delayed postcontrast imaging  This lies anterior to the tracheoesophageal groove below the level of the cricoid cartilage  This appears to relate to the target lesion of the thyroid biopsy 10/13/2022   and the suspicious lesion noted on 2/16/2023 nuclear medicine parathyroid scan      There is no other mass lesion demonstrated which meets the enhancement pattern suspicious for parathyroid adenoma      VISUALIZED BRAIN PARENCHYMA: No significant abnormality evident      VISUALIZED ORBITS: No significant abnormality evident      PARANASAL SINUSES: Mild to moderate mucosal thickening bilateral maxillary sinuses  No other significant abnormality evident      NASAL CAVITY AND NASOPHARYNX: No significant abnormality evident      SUPRAHYOID NECK: No significant abnormality evident      INFRAHYOID NECK: No significant abnormality evident in the aryepiglottic folds, piriform sinus, glottis or supraglottic regions      THYROID GLAND: Low-attenuation 7 mm thyroid nodule mid polar region right lobe of the thyroid gland  No other significant abnormality evident      PAROTID AND SUBMANDIBULAR GLANDS: No significant abnormality evident      LYMPH NODES: No lymphadenopathy evident      VASCULAR STRUCTURES: No significant abnormality evident   No vascular anomaly to suggest aberrant anatomic location of recurrent laryngeal nerve  No hemodynamically significant stenosis evident      THORACIC INLET: No significant abnormality evident      BONY STRUCTURES: Hypertrophic cervical spondylosis at C6-C7 results in neural foraminal narrowing of a moderate degree on the right and mild degree on the left  No other significant abnormality evident      NASCET criteria was used to determine the degree of internal carotid artery diameter stenosis      IMPRESSION:     There is a single candidate parathyroid lesion detailed above      Subcentimeter thyroid nodule right lobe characterized Ti-RADS 2 on 7/28/2022 thyroid ultrasound; not suspicious      Mild to moderate mucosal thickening of the bilateral maxillary sinuses      Hypertrophic cervical spondylosis at C6-C7 results in neural foraminal narrowing of a moderate degree on the right and mild degree on the left         Workstation performed: [id number]      Workstation performed: [id number]  I reviewed the above laboratory and imaging data  Discussion/Summary:-year-old woman, primary hyperparathyroidism, with apparent left-sided target both on sestamibi scan and CAT scan  She is amenable to and a candidate for middle invasive left parathyroidectomy, possible for that exploration, with intraoperative PTH monitoring  Rationale for this along risk benefits of surgery for infection, bleeding, need for possible additional surgery, discussed  All questions answered and consent signed at this visit

## 2023-04-26 NOTE — H&P (VIEW-ONLY)
Surgical Oncology Consult       [facility] [place]  [address] [facility] [address]  Lora Castillo  1970  [phone]  [facility] [place]  [address] [facility] [address]  [phone]    Chief Complaint   Patient presents with    Consult       Assessment/Plan:    No problem-specific Assessment & Plan notes found for this encounter  Diagnoses and all orders for this visit:    Hyperparathyroidism [facility])  -     Ambulatory referral to Surgical Oncology    Hypercalcemia    Multiple thyroid nodules        Advance Care Planning/Advance Directives:  Discussed disease status, cancer treatment plans and/or cancer treatment goals with the patient  Oncology History    No history exists  History of Present Illness: Patient is a 28-year-old woman who has had increasing fatigue, depressive type symptoms, bilateral upper extremity aches and pains, and a kidney stone he was found to have hypercalcemia  Further work-up included sestamibi scan and CAT scan which were suggestive of a left-sided parathyroid problem  No personal or family history of endocrine malignancies  Review of Systems   Constitutional: Positive for fatigue  HENT: Negative  Eyes: Negative  Respiratory: Negative  Cardiovascular: Negative  Gastrointestinal: Negative  Negative for abdominal pain, constipation, diarrhea and nausea  Endocrine: Negative  Genitourinary: Negative  Musculoskeletal: Positive for arthralgias and myalgias  Skin: Negative  Allergic/Immunologic: Negative  Neurological: Positive for weakness  Hematological: Negative  Psychiatric/Behavioral: Positive for decreased concentration  The patient is nervous/anxious  All other systems reviewed and are negative          Patient Active Problem List   Diagnosis    Pap smear for cervical cancer screening    Encounter for screening mammogram for malignant neoplasm of breast    Dyspareunia in female    Hypercalcemia    Multiple thyroid nodules    Migraine without aura and without status migrainosus, not intractable    Vitamin D deficiency    Hyperparathyroidism ([facility] [address] )     Past Medical History:   Diagnosis Date    Hyperlipidemia      Past Surgical History:   Procedure Laterality Date     SECTION      OOPHORECTOMY Right     US GUIDED THYROID BIOPSY  10/13/2022     Family History   Problem Relation Age of Onset    Cancer Mother 72        Unknown type    Diabetes type II Father     Diabetes Father     Clotting disorder Father     Lung cancer Father 58        mesothylioma    No Known Problems Sister     No Known Problems Sister     No Known Problems Brother      Social History     Socioeconomic History    Marital status: /Civil Union     Spouse name: Not on file    Number of children: Not on file    Years of education: Not on file    Highest education level: Not on file   Occupational History    Not on file   Tobacco Use    Smoking status: Never    Smokeless tobacco: Never   Vaping Use    Vaping Use: Never used   Substance and Sexual Activity    Alcohol use: Not Currently    Drug use: Never    Sexual activity: Yes     Partners: Male   Other Topics Concern    Not on file   Social History Narrative    Not on file     Social Determinants of Health     Financial Resource Strain: Not on file   Food Insecurity: Not on file   Transportation Needs: Not on file   Physical Activity: Not on file   Stress: Not on file   Social Connections: Not on file   Intimate Partner Violence: Not on file   Housing Stability: Not on file       Current Outpatient Medications:     Ascorbic Acid (VITAMIN C PO), Take by mouth, Disp: , Rfl:     Cyanocobalamin (VITAMIN B-12 PO), Take by mouth, Disp: , Rfl:     Pyridoxine HCl (VITAMIN B-6 PO), Take by mouth, Disp: , Rfl:   VITAMIN D PO, Take by mouth, Disp: , Rfl:     estradiol (ESTRACE VAGINAL) 0 1 mg/g vaginal cream, Insert 1 g into the vagina 3 (three) times a week (Patient not taking: Reported on 1/4/2023), Disp: 42 5 g, Rfl: 3  No Known Allergies  Vitals:    04/26/23 1051   BP: 120/84   Pulse: 78   Resp: 17   Temp: (!) 96 3 °F (35 7 °C)   SpO2: 97%       Physical Exam  Vitals reviewed  Constitutional:       Appearance: Normal appearance  HENT:      Head: Normocephalic and atraumatic  Eyes:      Extraocular Movements: Extraocular movements intact  Pupils: Pupils are equal, round, and reactive to light  Neck:      Vascular: No carotid bruit  Cardiovascular:      Rate and Rhythm: Normal rate and regular rhythm  Heart sounds: Normal heart sounds  Pulmonary:      Effort: Pulmonary effort is normal       Breath sounds: Normal breath sounds  Abdominal:      General: Abdomen is flat  Palpations: Abdomen is soft  Musculoskeletal:         General: Normal range of motion  Cervical back: Normal range of motion and neck supple  No rigidity or tenderness  Lymphadenopathy:      Cervical: No cervical adenopathy  Skin:     General: Skin is warm and dry  Neurological:      General: No focal deficit present  Mental Status: She is alert     Psychiatric:         Mood and Affect: Mood normal          Behavior: Behavior normal          Pathology:  Case Report   Non-gynecologic Cytology                          Case: [id number]                                   Authorizing Provider: Cathie Campoverde MD          Collected:           10/13/2022 [facility]               Ordering Location:     [address] [place] [address]      Received:            10/13/2022 [address] [place] [address] Ultrasound                                                           Pathologist:           Argelia Ruiz MD                                                     Specimens:   A) - Thyroid, Left, left lower "pole                                                                  B) - Thyroid, Left, left lower pole                                                        Final Diagnosis   A -B  Thyroid, Left lower (Thin-prep and smear preparations):    Benign (Page Category II) - See note  Lymphocytes and scant groups of oncocytes/follicle cells  Findings are suggestive of chronic lymphocytic (Hashimoto) thyroiditis in the proper clinical setting      Satisfactory for evaluation      Note: As reported in the [facility] for Reporting Thyroid Cytopathology*, the diagnostic category of \"benign/negative for malignancy\" carries a 0-3% risk of malignancy being found in subsequent resections (in the few studies of patients with benign FNA results that were followed long-term, a false negative rate of 0-3% was reported), with the usual management being clinical follow-up supplemented by ultrasonography (US) as indicated  Repeat FNA may be indicated for nodules showing significant growth or developing US abnormalities   Ultimately, clinical/imaging correlation for this patient is needed in arriving at the actual management plan      *The Page System for Reporting Thyroid Cytopathology, Mavis Zhang ), 2018 (2nd ed )    Electronically signed by David Cole MD on 10/17/2022 at 10:31 AM         Labs:  Lab Results   Component Value Date    CALCIUM 10 7 (H) 01/20/2023    CALCIUM 10 7 (H) 01/20/2023     Lab Results   Component Value Date    CALCIUM 10 7 (H) 01/20/2023    CALCIUM 10 7 (H) 01/20/2023    1 Follow-up Encounter  Component Ref Range & Units 1/20/23  8:31 AM 1/20/23  8:31 AM 10/21/22 12:12 PM 10/21/22 12:12 PM 5/5/22  6:10 PM 9/30/20  2:48 AM 1/10/20  8:22 PM   PTH, Intact 16 - 77 pg/mL 125 High                 THYROID ULTRASOUND     INDICATION:    E04 1: Nontoxic single thyroid nodule      COMPARISON:  None     TECHNIQUE:   Ultrasound of the thyroid was performed with a high " frequency linear transducer in transverse and sagittal planes including volumetric imaging sweeps as well as traditional still imaging technique      FINDINGS:  Normal homogeneous smooth echotexture      Right lobe: 5 4 x 1 1 x 1 9 cm  Volume 5 4 mL  Left lobe:  5 8 x 1 0 x 1 7 cm  Volume 4 9 mL  Isthmus: 0 1  cm      Nodule #1  Image 55  LEFT lower pole nodule measuring 1 6 x 0 7 x 1 1 cm  COMPOSITION:  2 points, solid or almost completely solid   ECHOGENICITY:  3 points, very hypoechoic  SHAPE:  0 points, wider-than-tall  MARGIN: 0 points, smooth  ECHOGENIC FOCI:  1 point, macrocalcifications  TI-RADS Classification: TR 4 (4-6 points), FNA if > 1 5 cm  Follow if > 1cm      Nodule #2  Image 72  RIGHT upper pole nodule measuring 0 8 x 0 5 x 0 7 cm  COMPOSITION:  1 point, mixed cystic and solid  ECHOGENICITY:  1 point, hyperechoic or isoechoic  SHAPE:  0 points, wider-than-tall  MARGIN: 0 points, smooth  ECHOGENIC FOCI:  0 points, none or large comet-tail artifacts  TI-RADS Classification: TR 2 (2 points), Not suspious  No FNA           IMPRESSION:     The following meet current ACR criteria for recommending ultrasound guided biopsy:  1 6 x 0 7 x 1 1 cm TR 4 nodule lower pole left lobe  Additional nodule upper pole right lobe is considered not suspicious      The study was marked in EPIC for immediate notification            Reference: ACR Thyroid Imaging, Reporting and Data System (TI-RADS): White Paper of the [employer]   J AM Cintia Radiol [id number];21:[id number]  (additional recommendations based on American Thyroid Association 2015 guidelines )        Workstation performed: [id number]      Imaging  PARATHYROID SCAN     INDICATION:  E21 3: Hyperparathyroidism, unspecified  E83 52: Hypercalcemia     COMPARISON:  Thyroid ultrasound 7/28/2022     TECHNIQUE:   Following the intravenous administration of 26 7 mCi Tc-99m Cardiolite, anterior and bilateral anterior oblique projection images of the neck and mediastinum were obtained at approximately 10 minutes post injection followed at 2 hours post   injection by static anterior and bilateral oblique projections as well as SPECT images in coronal, sagittal and axial projections       FINDINGS:     Early planar images demonstrate radiotracer uptake in the thyroid gland  There is focal increased radiotracer uptake at the inferior aspect of the left lobe of the thyroid      Delayed images demonstrate washout of thyroid gland activity  Residual focus of uptake identified in the left lower pole region      SPECT-CT images demonstrate focal uptake in the left lower pole region  Based on prior ultrasound, there was previously a 1 6 x 0 7 x 1 1 cm nodule in this region  No additional foci      IMPRESSION:     1  Focal radiotracer uptake in the left lower pole region would be suspicious for parathyroid adenoma  However this does appear to correspond to the previously biopsied benign thyroid nodule  Thyroid nodules can demonstrate incidental radiotracer   uptake  Correlate with prior tissue sampling results  2   No additional suspicious foci of uptake           CTA  NECK  WITHOUT AND WITH CONTRAST FROM AYLEEN TO SKULL BASE     INDICATION: Hyperparathyroidism      COMPARISON:  Relevant examinations including nuclear medicine parathyroid scan 2/16/2023      TECHNIQUE:  Both noncontrast, contrast, and post contrast delayed images were performed according to standard parathyroid protocol (4D technique) Coronal and sagittal reconstructions were performed  3D reconstructions were performed on an independant   workstation, and are supplied for review  This examination, like all CT scans performed in the [facility], was performed utilizing techniques to minimize radiation dose exposure, including the use of iterative reconstruction and   automated exposure control    Rad dose 1470 mGy-cm      IV Contrast:  85 mL of iohexol (OMNIPAQUE)     FINDINGS:     SERIAL NONCONTRAST, POST CONTRAST AND DELAYS:     CANDIDATE PARATHYROID LESIONS: Candidate lesion #1 a well-circumscribed ovoid mass adjacent to the inferolateral aspect of the lower pole of the left lobe of the thyroid gland measuring approximately 7 x 12 mm in short and long axis diameter series 4   axial image 131 with a maximal cephalocaudad diameter of 14 mm series 602 sagittal image 50  This is lower in attenuation than the thyroid tissue on noncontrast imaging, isoattenuating to thyroid tissue on arterial phase imaging and demonstrates washout   with lower attenuation than the thyroid gland on delayed postcontrast imaging  This lies anterior to the tracheoesophageal groove below the level of the cricoid cartilage  This appears to relate to the target lesion of the thyroid biopsy 10/13/2022   and the suspicious lesion noted on 2/16/2023 nuclear medicine parathyroid scan      There is no other mass lesion demonstrated which meets the enhancement pattern suspicious for parathyroid adenoma      VISUALIZED BRAIN PARENCHYMA: No significant abnormality evident      VISUALIZED ORBITS: No significant abnormality evident      PARANASAL SINUSES: Mild to moderate mucosal thickening bilateral maxillary sinuses  No other significant abnormality evident      NASAL CAVITY AND NASOPHARYNX: No significant abnormality evident      SUPRAHYOID NECK: No significant abnormality evident      INFRAHYOID NECK: No significant abnormality evident in the aryepiglottic folds, piriform sinus, glottis or supraglottic regions      THYROID GLAND: Low-attenuation 7 mm thyroid nodule mid polar region right lobe of the thyroid gland  No other significant abnormality evident      PAROTID AND SUBMANDIBULAR GLANDS: No significant abnormality evident      LYMPH NODES: No lymphadenopathy evident      VASCULAR STRUCTURES: No significant abnormality evident   No vascular anomaly to suggest aberrant anatomic location of recurrent laryngeal nerve  No hemodynamically significant stenosis evident      THORACIC INLET: No significant abnormality evident      BONY STRUCTURES: Hypertrophic cervical spondylosis at C6-C7 results in neural foraminal narrowing of a moderate degree on the right and mild degree on the left  No other significant abnormality evident      NASCET criteria was used to determine the degree of internal carotid artery diameter stenosis      IMPRESSION:     There is a single candidate parathyroid lesion detailed above      Subcentimeter thyroid nodule right lobe characterized Ti-RADS 2 on 7/28/2022 thyroid ultrasound; not suspicious      Mild to moderate mucosal thickening of the bilateral maxillary sinuses      Hypertrophic cervical spondylosis at C6-C7 results in neural foraminal narrowing of a moderate degree on the right and mild degree on the left         Workstation performed: [id number]      Workstation performed: [id number]  I reviewed the above laboratory and imaging data  Discussion/Summary:-year-old woman, primary hyperparathyroidism, with apparent left-sided target both on sestamibi scan and CAT scan  She is amenable to and a candidate for middle invasive left parathyroidectomy, possible for that exploration, with intraoperative PTH monitoring  Rationale for this along risk benefits of surgery for infection, bleeding, need for possible additional surgery, discussed  All questions answered and consent signed at this visit

## 2023-04-26 NOTE — LETTER
April 26, 2023     Laina Munoz [address] [address]    Patient: Jennifer Hogan   YOB: 1970   Date of Visit: 4/26/2023       Dear Dr Meredith Falcon:    Thank you for referring Zehra Vu to me for evaluation  Below are my notes for this consultation  If you have questions, please do not hesitate to call me  I look forward to following your patient along with you  Sincerely,        Letha Patel MD        CC: MD Kait Burks MD Marry Burden, MD  4/26/2023 11:34 AM  Sign when Signing Visit               Surgical Oncology Consult       [address] [facility] [place]  [address]  [facility] [address]  [place] [address]  1970  [id number]  [address] [facility] CANCER [facility] SURGICAL ONCOLOGY [place]  [address]  [facility] [address] [facility]  [phone]    Chief Complaint   Patient presents with   • Consult       Assessment/Plan:    No problem-specific Assessment & Plan notes found for this encounter  Diagnoses and all orders for this visit:    Hyperparathyroidism [facility])  -     Ambulatory referral to Surgical Oncology    Hypercalcemia    Multiple thyroid nodules       Advance Care Planning/Advance Directives:  Discussed disease status, cancer treatment plans and/or cancer treatment goals with the patient  Oncology History    No history exists  History of Present Illness: Patient is a 51-year-old woman who has had increasing fatigue, depressive type symptoms, bilateral upper extremity aches and pains, and a kidney stone he was found to have hypercalcemia  Further work-up included sestamibi scan and CAT scan which were suggestive of a left-sided parathyroid problem  No personal or family history of endocrine malignancies  Review of Systems   Constitutional: Positive for fatigue  HENT: Negative      Eyes: Negative  Respiratory: Negative  Cardiovascular: Negative  Gastrointestinal: Negative  Negative for abdominal pain, constipation, diarrhea and nausea  Endocrine: Negative  Genitourinary: Negative  Musculoskeletal: Positive for arthralgias and myalgias  Skin: Negative  Allergic/Immunologic: Negative  Neurological: Positive for weakness  Hematological: Negative  Psychiatric/Behavioral: Positive for decreased concentration  The patient is nervous/anxious  All other systems reviewed and are negative          Patient Active Problem List   Diagnosis   • Pap smear for cervical cancer screening   • Encounter for screening mammogram for malignant neoplasm of breast   • Dyspareunia in female   • Hypercalcemia   • Multiple thyroid nodules   • Migraine without aura and without status migrainosus, not intractable   • Vitamin D deficiency   • Hyperparathyroidism ([facility] [address] )     Past Medical History:   Diagnosis Date   • Hyperlipidemia      Past Surgical History:   Procedure Laterality Date   •  SECTION     • OOPHORECTOMY Right    • US GUIDED THYROID BIOPSY  10/13/2022     Family History   Problem Relation Age of Onset   • Cancer Mother 72        Unknown type   • Diabetes type II Father    • Diabetes Father    • Clotting disorder Father    • Lung cancer Father 58        mesothylioma   • No Known Problems Sister    • No Known Problems Sister    • No Known Problems Brother      Social History     Socioeconomic History   • Marital status: /Civil Union     Spouse name: Not on file   • Number of children: Not on file   • Years of education: Not on file   • Highest education level: Not on file   Occupational History   • Not on file   Tobacco Use   • Smoking status: Never   • Smokeless tobacco: Never   Vaping Use   • Vaping Use: Never used   Substance and Sexual Activity   • Alcohol use: Not Currently   • Drug use: Never   • Sexual activity: Yes     Partners: Male   Other Topics Concern   • Not on file   Social History Narrative   • Not on file     Social Determinants of Health     Financial Resource Strain: Not on file   Food Insecurity: Not on file   Transportation Needs: Not on file   Physical Activity: Not on file   Stress: Not on file   Social Connections: Not on file   Intimate Partner Violence: Not on file   Housing Stability: Not on file       Current Outpatient Medications:   •  Ascorbic Acid (VITAMIN C PO), Take by mouth, Disp: , Rfl:   •  Cyanocobalamin (VITAMIN B-12 PO), Take by mouth, Disp: , Rfl:   •  Pyridoxine HCl (VITAMIN B-6 PO), Take by mouth, Disp: , Rfl:   •  VITAMIN D PO, Take by mouth, Disp: , Rfl:   •  estradiol (ESTRACE VAGINAL) 0 1 mg/g vaginal cream, Insert 1 g into the vagina 3 (three) times a week (Patient not taking: Reported on 1/4/2023), Disp: 42 5 g, Rfl: 3  No Known Allergies  Vitals:    04/26/23 1051   BP: 120/84   Pulse: 78   Resp: 17   Temp: (!) 96 3 °F (35 7 °C)   SpO2: 97%       Physical Exam  Vitals reviewed  Constitutional:       Appearance: Normal appearance  HENT:      Head: Normocephalic and atraumatic  Eyes:      Extraocular Movements: Extraocular movements intact  Pupils: Pupils are equal, round, and reactive to light  Neck:      Vascular: No carotid bruit  Cardiovascular:      Rate and Rhythm: Normal rate and regular rhythm  Heart sounds: Normal heart sounds  Pulmonary:      Effort: Pulmonary effort is normal       Breath sounds: Normal breath sounds  Abdominal:      General: Abdomen is flat  Palpations: Abdomen is soft  Musculoskeletal:         General: Normal range of motion  Cervical back: Normal range of motion and neck supple  No rigidity or tenderness  Lymphadenopathy:      Cervical: No cervical adenopathy  Skin:     General: Skin is warm and dry  Neurological:      General: No focal deficit present  Mental Status: She is alert     Psychiatric:         Mood and Affect: Mood normal          Behavior: Behavior "normal          Pathology:  Case Report   Non-gynecologic Cytology                          Case: [id number]                                   Authorizing Provider: Vik Silva MD          Collected:           10/13/2022 1142               Ordering Location:     [address] [place] [address]      Received:            10/13/2022 [address] [place] [address] Ultrasound                                                           Pathologist:           Argelia Ruiz MD                                                     Specimens:   A) - Thyroid, Left, left lower pole                                                                  B) - Thyroid, Left, left lower pole                                                        Final Diagnosis   A -B  Thyroid, Left lower (Thin-prep and smear preparations):    Benign ([place] Category II) - See note  Lymphocytes and scant groups of oncocytes/follicle cells  Findings are suggestive of chronic lymphocytic (Hashimoto) thyroiditis in the proper clinical setting      Satisfactory for evaluation      Note: As reported in the [address] [place] [address] for Reporting Thyroid Cytopathology*, the diagnostic category of \"benign/negative for malignancy\" carries a 0-3% risk of malignancy being found in subsequent resections (in the few studies of patients with benign FNA results that were followed long-term, a false negative rate of 0-3% was reported), with the usual management being clinical follow-up supplemented by ultrasonography (US) as indicated  Repeat FNA may be indicated for nodules showing significant growth or developing US abnormalities   Ultimately, clinical/imaging correlation for this patient is needed in arriving at the actual management plan      *The [place] System for Reporting Thyroid Cytopathology, Faiza Goddard ), 2018 (2nd ed )    Electronically signed by Argelia Ruiz MD on 10/17/2022 at 10:31 AM         Labs:  Lab " Results   Component Value Date    CALCIUM 10 7 (H) 01/20/2023    CALCIUM 10 7 (H) 01/20/2023     Lab Results   Component Value Date    CALCIUM 10 7 (H) 01/20/2023    CALCIUM 10 7 (H) 01/20/2023    1 Follow-up Encounter  Component Ref Range & Units 1/20/23  8:31 AM 1/20/23  8:31 AM 10/21/22 12:12 PM 10/21/22 12:12 PM 5/5/22  6:10 PM 9/30/20  2:48 AM 1/10/20  8:22 PM   PTH, Intact 16 - 77 pg/mL 125 High                 THYROID ULTRASOUND     INDICATION:    E04 1: Nontoxic single thyroid nodule      COMPARISON:  None     TECHNIQUE:   Ultrasound of the thyroid was performed with a high frequency linear transducer in transverse and sagittal planes including volumetric imaging sweeps as well as traditional still imaging technique      FINDINGS:  Normal homogeneous smooth echotexture      Right lobe: 5 4 x 1 1 x 1 9 cm  Volume 5 4 mL  Left lobe:  5 8 x 1 0 x 1 7 cm  Volume 4 9 mL  Isthmus: 0 1  cm      Nodule #1  Image 55  LEFT lower pole nodule measuring 1 6 x 0 7 x 1 1 cm  COMPOSITION:  2 points, solid or almost completely solid   ECHOGENICITY:  3 points, very hypoechoic  SHAPE:  0 points, wider-than-tall  MARGIN: 0 points, smooth  ECHOGENIC FOCI:  1 point, macrocalcifications  TI-RADS Classification: TR 4 (4-6 points), FNA if > 1 5 cm  Follow if > 1cm      Nodule #2  Image 72  RIGHT upper pole nodule measuring 0 8 x 0 5 x 0 7 cm  COMPOSITION:  1 point, mixed cystic and solid  ECHOGENICITY:  1 point, hyperechoic or isoechoic  SHAPE:  0 points, wider-than-tall  MARGIN: 0 points, smooth  ECHOGENIC FOCI:  0 points, none or large comet-tail artifacts  TI-RADS Classification: TR 2 (2 points), Not suspious  No FNA           IMPRESSION:     The following meet current ACR criteria for recommending ultrasound guided biopsy:  1 6 x 0 7 x 1 1 cm TR 4 nodule lower pole left lobe    Additional nodule upper pole right lobe is considered not suspicious      The study was marked in EPIC for immediate notification            Reference: ACR Thyroid Imaging, Reporting and Data System (TI-RADS): White Paper of the [employer]  J AM Cintia Radiol 7751;11:814-877  (additional recommendations based on American Thyroid Association 2015 guidelines )        Workstation performed: [id number]      Imaging  PARATHYROID SCAN     INDICATION:  E21 3: Hyperparathyroidism, unspecified  E83 52: Hypercalcemia     COMPARISON:  Thyroid ultrasound 7/28/2022     TECHNIQUE:   Following the intravenous administration of 26 7 mCi Tc-99m Cardiolite, anterior and bilateral anterior oblique projection images of the neck and mediastinum were obtained at approximately 10 minutes post injection followed at 2 hours post   injection by static anterior and bilateral oblique projections as well as SPECT images in coronal, sagittal and axial projections       FINDINGS:     Early planar images demonstrate radiotracer uptake in the thyroid gland  There is focal increased radiotracer uptake at the inferior aspect of the left lobe of the thyroid      Delayed images demonstrate washout of thyroid gland activity  Residual focus of uptake identified in the left lower pole region      SPECT-CT images demonstrate focal uptake in the left lower pole region  Based on prior ultrasound, there was previously a 1 6 x 0 7 x 1 1 cm nodule in this region  No additional foci      IMPRESSION:     1  Focal radiotracer uptake in the left lower pole region would be suspicious for parathyroid adenoma  However this does appear to correspond to the previously biopsied benign thyroid nodule  Thyroid nodules can demonstrate incidental radiotracer   uptake  Correlate with prior tissue sampling results     2   No additional suspicious foci of uptake           CTA  NECK  WITHOUT AND WITH CONTRAST FROM AYLEEN TO SKULL BASE     INDICATION: Hyperparathyroidism      COMPARISON:  Relevant examinations including nuclear medicine parathyroid scan 2/16/2023      TECHNIQUE: Both noncontrast, contrast, and post contrast delayed images were performed according to standard parathyroid protocol (4D technique) Coronal and sagittal reconstructions were performed  3D reconstructions were performed on an independant   workstation, and are supplied for review  This examination, like all CT scans performed in the [facility], was performed utilizing techniques to minimize radiation dose exposure, including the use of iterative reconstruction and   automated exposure control  Rad dose 1470 mGy-cm      IV Contrast:  85 mL of iohexol (OMNIPAQUE)     FINDINGS:     SERIAL NONCONTRAST, POST CONTRAST AND DELAYS:     CANDIDATE PARATHYROID LESIONS: Candidate lesion #1 a well-circumscribed ovoid mass adjacent to the inferolateral aspect of the lower pole of the left lobe of the thyroid gland measuring approximately 7 x 12 mm in short and long axis diameter series 4   axial image 131 with a maximal cephalocaudad diameter of 14 mm series 602 sagittal image 50  This is lower in attenuation than the thyroid tissue on noncontrast imaging, isoattenuating to thyroid tissue on arterial phase imaging and demonstrates washout   with lower attenuation than the thyroid gland on delayed postcontrast imaging  This lies anterior to the tracheoesophageal groove below the level of the cricoid cartilage  This appears to relate to the target lesion of the thyroid biopsy 10/13/2022   and the suspicious lesion noted on 2/16/2023 nuclear medicine parathyroid scan      There is no other mass lesion demonstrated which meets the enhancement pattern suspicious for parathyroid adenoma      VISUALIZED BRAIN PARENCHYMA: No significant abnormality evident      VISUALIZED ORBITS: No significant abnormality evident      PARANASAL SINUSES: Mild to moderate mucosal thickening bilateral maxillary sinuses   No other significant abnormality evident      NASAL CAVITY AND NASOPHARYNX: No significant abnormality evident      SUPRAHYOID NECK: No significant abnormality evident      INFRAHYOID NECK: No significant abnormality evident in the aryepiglottic folds, piriform sinus, glottis or supraglottic regions      THYROID GLAND: Low-attenuation 7 mm thyroid nodule mid polar region right lobe of the thyroid gland  No other significant abnormality evident      PAROTID AND SUBMANDIBULAR GLANDS: No significant abnormality evident      LYMPH NODES: No lymphadenopathy evident      VASCULAR STRUCTURES: No significant abnormality evident  No vascular anomaly to suggest aberrant anatomic location of recurrent laryngeal nerve  No hemodynamically significant stenosis evident      THORACIC INLET: No significant abnormality evident      BONY STRUCTURES: Hypertrophic cervical spondylosis at C6-C7 results in neural foraminal narrowing of a moderate degree on the right and mild degree on the left  No other significant abnormality evident      NASCET criteria was used to determine the degree of internal carotid artery diameter stenosis      IMPRESSION:     There is a single candidate parathyroid lesion detailed above      Subcentimeter thyroid nodule right lobe characterized Ti-RADS 2 on 7/28/2022 thyroid ultrasound; not suspicious      Mild to moderate mucosal thickening of the bilateral maxillary sinuses      Hypertrophic cervical spondylosis at C6-C7 results in neural foraminal narrowing of a moderate degree on the right and mild degree on the left         Workstation performed: [id number]      Workstation performed: [id number]  I reviewed the above laboratory and imaging data  Discussion/Summary:-year-old woman, primary hyperparathyroidism, with apparent left-sided target both on sestamibi scan and CAT scan  She is amenable to and a candidate for middle invasive left parathyroidectomy, possible for that exploration, with intraoperative PTH monitoring    Rationale for this along risk benefits of surgery for infection, bleeding, need for possible additional surgery, discussed  All questions answered and consent signed at this visit

## 2023-04-27 ENCOUNTER — HOSPITAL ENCOUNTER (OUTPATIENT)
Dept: RADIOLOGY | Facility: HOSPITAL | Age: 53
Discharge: HOME | End: 2023-04-27

## 2023-04-27 ENCOUNTER — OFFICE VISIT (OUTPATIENT)
Dept: LAB | Facility: CLINIC | Age: 53
End: 2023-04-27

## 2023-04-27 DIAGNOSIS — E21.3 HYPERPARATHYROIDISM (HCC): ICD-10-CM

## 2023-04-27 LAB
ALBUMIN SERPL-MCNC: 4.6 G/DL (ref 3.6–5.1)
ALBUMIN/GLOB SERPL: 1.5 (CALC) (ref 1–2.5)
ALP SERPL-CCNC: 67 U/L (ref 37–153)
ALT SERPL-CCNC: 16 U/L (ref 6–29)
AST SERPL-CCNC: 15 U/L (ref 10–35)
ATRIAL RATE: 80 BPM
BASOPHILS # BLD AUTO: 50 CELLS/UL (ref 0–200)
BASOPHILS NFR BLD AUTO: 0.9 %
BILIRUB SERPL-MCNC: 0.3 MG/DL (ref 0.2–1.2)
BUN SERPL-MCNC: 24 MG/DL (ref 7–25)
BUN/CREAT SERPL: ABNORMAL (CALC) (ref 6–22)
CALCIUM SERPL-MCNC: 11.1 MG/DL (ref 8.6–10.4)
CHLORIDE SERPL-SCNC: 100 MMOL/L (ref 98–110)
CO2 SERPL-SCNC: 29 MMOL/L (ref 20–32)
CREAT SERPL-MCNC: 0.61 MG/DL (ref 0.5–1.03)
EOSINOPHIL # BLD AUTO: 204 CELLS/UL (ref 15–500)
EOSINOPHIL NFR BLD AUTO: 3.7 %
ERYTHROCYTE [DISTWIDTH] IN BLOOD BY AUTOMATED COUNT: 12.1 % (ref 11–15)
GFR/BSA.PRED SERPLBLD CYS-BASED-ARV: 109 ML/MIN/1.73M2
GLOBULIN SER CALC-MCNC: 3 G/DL (CALC) (ref 1.9–3.7)
GLUCOSE SERPL-MCNC: 78 MG/DL (ref 65–139)
HCT VFR BLD AUTO: 39.8 % (ref 35–45)
HGB BLD-MCNC: 13.2 G/DL (ref 11.7–15.5)
LYMPHOCYTES # BLD AUTO: 2365 CELLS/UL (ref 850–3900)
LYMPHOCYTES NFR BLD AUTO: 43 %
MCH RBC QN AUTO: 30.3 PG (ref 27–33)
MCHC RBC AUTO-ENTMCNC: 33.2 G/DL (ref 32–36)
MCV RBC AUTO: 91.5 FL (ref 80–100)
MONOCYTES # BLD AUTO: 495 CELLS/UL (ref 200–950)
MONOCYTES NFR BLD AUTO: 9 %
NEUTROPHILS # BLD AUTO: 2387 CELLS/UL (ref 1500–7800)
NEUTROPHILS NFR BLD AUTO: 43.4 %
P AXIS: 29 DEGREES
PLATELET # BLD AUTO: 276 THOUSAND/UL (ref 140–400)
PMV BLD REES-ECKER: 10.9 FL (ref 7.5–12.5)
POTASSIUM SERPL-SCNC: 4 MMOL/L (ref 3.5–5.3)
PR INTERVAL: 150 MS
PROT SERPL-MCNC: 7.6 G/DL (ref 6.1–8.1)
QRS AXIS: 54 DEGREES
QRSD INTERVAL: 90 MS
QT INTERVAL: 348 MS
QTC INTERVAL: 401 MS
RBC # BLD AUTO: 4.35 MILLION/UL (ref 3.8–5.1)
SODIUM SERPL-SCNC: 135 MMOL/L (ref 135–146)
T WAVE AXIS: 56 DEGREES
VENTRICULAR RATE: 80 BPM
WBC # BLD AUTO: 5.5 THOUSAND/UL (ref 3.8–10.8)

## 2023-04-28 ENCOUNTER — ANESTHESIA EVENT (OUTPATIENT)
Dept: PERIOP | Facility: HOSPITAL | Age: 53
End: 2023-04-28

## 2023-04-28 NOTE — PRE-PROCEDURE INSTRUCTIONS
Pre-Surgery Instructions:   Medication Instructions    Ascorbic Acid (VITAMIN C PO) Hold day of surgery   Cyanocobalamin (VITAMIN B-12 PO) Hold day of surgery   Pyridoxine HCl (VITAMIN B-6 PO) Hold day of surgery   VITAMIN D PO Hold day of surgery  Medication instructions for day surgery reviewed  Please use only a sip of water to take your instructed medications  Avoid all over the counter vitamins, supplements and NSAIDS for one week prior to surgery per anesthesia guidelines  Tylenol is ok to take as needed  You will receive a call one business day prior to surgery with an arrival time and hospital directions  If your surgery is scheduled on a Monday, the hospital will be calling you on the Friday prior to your surgery  If you have not heard from anyone by 8pm, please call the hospital supervisor through the hospital  at [phone]  Yvonne Santoyo [phone])  Do not eat or drink anything after midnight the night before your surgery, including candy, mints, lifesavers, or chewing gum  Do not drink alcohol 24hrs before your surgery  Try not to smoke at least 24hrs before your surgery  Follow the pre surgery showering instructions as listed in the [facility] Surgical Experience Booklet or otherwise provided by your surgeon's office  Do not shave the surgical area 24 hours before surgery  Do not apply any lotions, creams, including makeup, cologne, deodorant, or perfumes after showering on the day of your surgery  No contact lenses, eye make-up, or artificial eyelashes  Remove nail polish, including gel polish, and any artificial, gel, or acrylic nails if possible  Remove all jewelry including rings and body piercing jewelry  Wear causal clothing that is easy to take on and off  Consider your type of surgery  Keep any valuables, jewelry, piercings at home  Please bring any specially ordered equipment (sling, braces) if indicated      Arrange for a responsible person to drive you to and from the hospital on the day of your surgery  Visitor Guidelines discussed  Call the surgeon's office with any new illnesses, exposures, or additional questions prior to surgery  Please reference your [facility] Surgical Experience Booklet for additional information to prepare for your upcoming surgery

## 2023-05-02 ENCOUNTER — ANESTHESIA (OUTPATIENT)
Dept: PERIOP | Facility: HOSPITAL | Age: 53
End: 2023-05-02

## 2023-05-02 ENCOUNTER — HOSPITAL ENCOUNTER (OUTPATIENT)
Facility: HOSPITAL | Age: 53
Setting detail: OUTPATIENT SURGERY
Discharge: HOME | End: 2023-05-02
Attending: SURGERY | Admitting: SURGERY
Payer: COMMERCIAL

## 2023-05-02 VITALS
HEIGHT: 65 IN | TEMPERATURE: 97.3 F | RESPIRATION RATE: 20 BRPM | OXYGEN SATURATION: 100 % | WEIGHT: 146 LBS | DIASTOLIC BLOOD PRESSURE: 74 MMHG | SYSTOLIC BLOOD PRESSURE: 98 MMHG | HEART RATE: 80 BPM | BODY MASS INDEX: 24.32 KG/M2

## 2023-05-02 DIAGNOSIS — E21.3 HYPERPARATHYROIDISM (HCC): ICD-10-CM

## 2023-05-02 DIAGNOSIS — E83.52 HYPERCALCEMIA: ICD-10-CM

## 2023-05-02 DIAGNOSIS — E04.2 MULTIPLE THYROID NODULES: ICD-10-CM

## 2023-05-02 LAB
CALCIUM SERPL-MCNC: 10.3 MG/DL (ref 8.3–10.1)
CALCIUM SERPL-MCNC: 8.6 MG/DL (ref 8.3–10.1)
CALCIUM SERPL-MCNC: 9.9 MG/DL (ref 8.3–10.1)
EXT PREGNANCY TEST URINE: NEGATIVE
EXT. CONTROL: NORMAL
PTH-INTACT SERPL-MCNC: 112.4 PG/ML (ref 18.4–80.1)
PTH-INTACT SERPL-MCNC: 127.3 PG/ML (ref 18.4–80.1)
PTH-INTACT SERPL-MCNC: 148.5 PG/ML (ref 18.4–80.1)
PTH-INTACT SERPL-MCNC: 38 PG/ML (ref 18.4–80.1)
PTH-INTACT SERPL-MCNC: 580.3 PG/ML (ref 18.4–80.1)

## 2023-05-02 PROCEDURE — 60500 EXPLORE PARATHYROID GLANDS: CPT | Performed by: SURGERY

## 2023-05-02 PROCEDURE — 88331 PATH CONSLTJ SURG 1 BLK 1SPC: CPT | Performed by: PATHOLOGY

## 2023-05-02 PROCEDURE — 82310 ASSAY OF CALCIUM: CPT | Performed by: SURGERY

## 2023-05-02 PROCEDURE — 81025 URINE PREGNANCY TEST: CPT | Performed by: SURGERY

## 2023-05-02 PROCEDURE — 88305 TISSUE EXAM BY PATHOLOGIST: CPT | Performed by: PATHOLOGY

## 2023-05-02 PROCEDURE — 83970 ASSAY OF PARATHORMONE: CPT | Performed by: SURGERY

## 2023-05-02 RX ORDER — LIDOCAINE HYDROCHLORIDE 10 MG/ML
INJECTION, SOLUTION EPIDURAL; INFILTRATION; INTRACAUDAL; PERINEURAL AS NEEDED
Status: DISCONTINUED | OUTPATIENT
Start: 2023-05-02 | End: 2023-05-02

## 2023-05-02 RX ORDER — HYDROMORPHONE HCL IN WATER/PF 6 MG/30 ML
0.2 PATIENT CONTROLLED ANALGESIA SYRINGE INTRAVENOUS
Status: DISCONTINUED | OUTPATIENT
Start: 2023-05-02 | End: 2023-05-02 | Stop reason: HOSPADM

## 2023-05-02 RX ORDER — DEXAMETHASONE SODIUM PHOSPHATE 10 MG/ML
INJECTION, SOLUTION INTRAMUSCULAR; INTRAVENOUS AS NEEDED
Status: DISCONTINUED | OUTPATIENT
Start: 2023-05-02 | End: 2023-05-02

## 2023-05-02 RX ORDER — PROPOFOL 10 MG/ML
INJECTION, EMULSION INTRAVENOUS AS NEEDED
Status: DISCONTINUED | OUTPATIENT
Start: 2023-05-02 | End: 2023-05-02

## 2023-05-02 RX ORDER — MAGNESIUM HYDROXIDE 1200 MG/15ML
LIQUID ORAL AS NEEDED
Status: DISCONTINUED | OUTPATIENT
Start: 2023-05-02 | End: 2023-05-02 | Stop reason: HOSPADM

## 2023-05-02 RX ORDER — EPHEDRINE SULFATE 50 MG/ML
INJECTION INTRAVENOUS AS NEEDED
Status: DISCONTINUED | OUTPATIENT
Start: 2023-05-02 | End: 2023-05-02

## 2023-05-02 RX ORDER — FENTANYL CITRATE 50 UG/ML
INJECTION, SOLUTION INTRAMUSCULAR; INTRAVENOUS AS NEEDED
Status: DISCONTINUED | OUTPATIENT
Start: 2023-05-02 | End: 2023-05-02

## 2023-05-02 RX ORDER — FENTANYL CITRATE/PF 50 MCG/ML
50 SYRINGE (ML) INJECTION
Status: DISCONTINUED | OUTPATIENT
Start: 2023-05-02 | End: 2023-05-02 | Stop reason: HOSPADM

## 2023-05-02 RX ORDER — ONDANSETRON 2 MG/ML
4 INJECTION INTRAMUSCULAR; INTRAVENOUS ONCE AS NEEDED
Status: DISCONTINUED | OUTPATIENT
Start: 2023-05-02 | End: 2023-05-02 | Stop reason: HOSPADM

## 2023-05-02 RX ORDER — ONDANSETRON 2 MG/ML
4 INJECTION INTRAMUSCULAR; INTRAVENOUS EVERY 6 HOURS PRN
Status: DISCONTINUED | OUTPATIENT
Start: 2023-05-02 | End: 2023-05-02 | Stop reason: HOSPADM

## 2023-05-02 RX ORDER — OXYCODONE HYDROCHLORIDE 5 MG/1
5 TABLET ORAL EVERY 4 HOURS PRN
Status: DISCONTINUED | OUTPATIENT
Start: 2023-05-02 | End: 2023-05-02 | Stop reason: HOSPADM

## 2023-05-02 RX ORDER — MIDAZOLAM HYDROCHLORIDE 2 MG/2ML
INJECTION, SOLUTION INTRAMUSCULAR; INTRAVENOUS AS NEEDED
Status: DISCONTINUED | OUTPATIENT
Start: 2023-05-02 | End: 2023-05-02

## 2023-05-02 RX ORDER — SODIUM CHLORIDE, SODIUM LACTATE, POTASSIUM CHLORIDE, CALCIUM CHLORIDE 600; 310; 30; 20 MG/100ML; MG/100ML; MG/100ML; MG/100ML
INJECTION, SOLUTION INTRAVENOUS CONTINUOUS PRN
Status: DISCONTINUED | OUTPATIENT
Start: 2023-05-02 | End: 2023-05-02

## 2023-05-02 RX ORDER — ROCURONIUM BROMIDE 10 MG/ML
INJECTION, SOLUTION INTRAVENOUS AS NEEDED
Status: DISCONTINUED | OUTPATIENT
Start: 2023-05-02 | End: 2023-05-02

## 2023-05-02 RX ORDER — CALCIUM CARBONATE 500 MG/1
1000 TABLET, CHEWABLE ORAL
Status: DISCONTINUED | OUTPATIENT
Start: 2023-05-02 | End: 2023-05-02 | Stop reason: HOSPADM

## 2023-05-02 RX ORDER — HYDROMORPHONE HCL/PF 1 MG/ML
0.5 SYRINGE (ML) INJECTION
Status: DISCONTINUED | OUTPATIENT
Start: 2023-05-02 | End: 2023-05-02 | Stop reason: HOSPADM

## 2023-05-02 RX ORDER — OXYCODONE HYDROCHLORIDE 5 MG/1
10 TABLET ORAL EVERY 4 HOURS PRN
Status: DISCONTINUED | OUTPATIENT
Start: 2023-05-02 | End: 2023-05-02 | Stop reason: HOSPADM

## 2023-05-02 RX ORDER — ONDANSETRON 2 MG/ML
INJECTION INTRAMUSCULAR; INTRAVENOUS AS NEEDED
Status: DISCONTINUED | OUTPATIENT
Start: 2023-05-02 | End: 2023-05-02

## 2023-05-02 RX ORDER — SODIUM CHLORIDE, SODIUM LACTATE, POTASSIUM CHLORIDE, CALCIUM CHLORIDE 600; 310; 30; 20 MG/100ML; MG/100ML; MG/100ML; MG/100ML
20 INJECTION, SOLUTION INTRAVENOUS CONTINUOUS
Status: DISCONTINUED | OUTPATIENT
Start: 2023-05-02 | End: 2023-05-02 | Stop reason: HOSPADM

## 2023-05-02 RX ORDER — BUPIVACAINE HYDROCHLORIDE 2.5 MG/ML
INJECTION, SOLUTION EPIDURAL; INFILTRATION; INTRACAUDAL AS NEEDED
Status: DISCONTINUED | OUTPATIENT
Start: 2023-05-02 | End: 2023-05-02 | Stop reason: HOSPADM

## 2023-05-02 RX ORDER — ACETAMINOPHEN 325 MG/1
650 TABLET ORAL EVERY 6 HOURS PRN
Status: DISCONTINUED | OUTPATIENT
Start: 2023-05-02 | End: 2023-05-02 | Stop reason: HOSPADM

## 2023-05-02 RX ORDER — METOCLOPRAMIDE HYDROCHLORIDE 5 MG/ML
10 INJECTION INTRAMUSCULAR; INTRAVENOUS ONCE AS NEEDED
Status: DISCONTINUED | OUTPATIENT
Start: 2023-05-02 | End: 2023-05-02 | Stop reason: HOSPADM

## 2023-05-02 RX ADMIN — FENTANYL CITRATE 50 MCG: 50 INJECTION INTRAMUSCULAR; INTRAVENOUS at 15:55

## 2023-05-02 RX ADMIN — LIDOCAINE HYDROCHLORIDE 50 MG: 10 INJECTION, SOLUTION EPIDURAL; INFILTRATION; INTRACAUDAL; PERINEURAL at 12:28

## 2023-05-02 RX ADMIN — ROCURONIUM BROMIDE 50 MG: 10 INJECTION INTRAVENOUS at 12:28

## 2023-05-02 RX ADMIN — SODIUM CHLORIDE, SODIUM LACTATE, POTASSIUM CHLORIDE, AND CALCIUM CHLORIDE: .6; .31; .03; .02 INJECTION, SOLUTION INTRAVENOUS at 12:34

## 2023-05-02 RX ADMIN — SUGAMMADEX 150 MG: 100 INJECTION, SOLUTION INTRAVENOUS at 15:19

## 2023-05-02 RX ADMIN — MIDAZOLAM 2 MG: 1 INJECTION INTRAMUSCULAR; INTRAVENOUS at 12:21

## 2023-05-02 RX ADMIN — DEXAMETHASONE SODIUM PHOSPHATE 10 MG: 10 INJECTION, SOLUTION INTRAMUSCULAR; INTRAVENOUS at 12:28

## 2023-05-02 RX ADMIN — SODIUM CHLORIDE, SODIUM LACTATE, POTASSIUM CHLORIDE, AND CALCIUM CHLORIDE 20 ML/HR: .6; .31; .03; .02 INJECTION, SOLUTION INTRAVENOUS at 10:16

## 2023-05-02 RX ADMIN — SODIUM CHLORIDE, SODIUM LACTATE, POTASSIUM CHLORIDE, CALCIUM CHLORIDE: 600; 310; 30; 20 INJECTION, SOLUTION INTRAVENOUS at 13:09

## 2023-05-02 RX ADMIN — ONDANSETRON 4 MG: 2 INJECTION INTRAMUSCULAR; INTRAVENOUS at 14:26

## 2023-05-02 RX ADMIN — FENTANYL CITRATE 50 MCG: 50 INJECTION, SOLUTION INTRAMUSCULAR; INTRAVENOUS at 12:28

## 2023-05-02 RX ADMIN — PROPOFOL 200 MG: 10 INJECTION, EMULSION INTRAVENOUS at 12:28

## 2023-05-02 RX ADMIN — ROCURONIUM BROMIDE 20 MG: 10 INJECTION INTRAVENOUS at 13:13

## 2023-05-02 RX ADMIN — ACETAMINOPHEN 650 MG: 325 TABLET ORAL at 17:40

## 2023-05-02 RX ADMIN — EPHEDRINE SULFATE 10 MG: 50 INJECTION INTRAVENOUS at 12:37

## 2023-05-02 NOTE — ANESTHESIA POSTPROCEDURE EVALUATION
Post-Op Assessment Note    CV Status:  Stable  Pain Score: 0    Pain management: adequate     Mental Status:  Sleepy and arousable   Hydration Status:  Euvolemic   PONV Controlled:  None   Airway Patency:  Patent      Post Op Vitals Reviewed: Yes      Staff: CRNA         There were no known notable events for this encounter      BP   133/73   Temp   97 8F   Pulse  99   Resp   13   SpO2   100

## 2023-05-02 NOTE — ANESTHESIA PREPROCEDURE EVALUATION
Procedure:  MINIMALLY INVASIVE LEFT PARATHYROIDECTOMY, POSSIBLE 4 GLAND EXPLORATION (Left: Neck)  MONITORING INTRAOPERATIVE PTH (PARATHYROID HORMONE) (Neck)    Relevant Problems   CARDIO   (+) Migraine without aura and without status migrainosus, not intractable      ENDO   (+) Hyperparathyroidism (HCC)      NEURO/PSYCH   (+) Migraine without aura and without status migrainosus, not intractable        Physical Exam    Airway    Mallampati score: I  TM Distance: >3 FB  Neck ROM: full     Dental   No notable dental hx     Cardiovascular  Rhythm: regular, Rate: normal, Cardiovascular exam normal    Pulmonary  Pulmonary exam normal Breath sounds clear to auscultation,     Other Findings        Anesthesia Plan  ASA Score- 2     Anesthesia Type- general with ASA Monitors  Additional Monitors: arterial line  Airway Plan: ETT  Plan Factors-Exercise tolerance (METS): >4 METS  Chart reviewed  EKG reviewed  Imaging results reviewed  Existing labs reviewed  Patient summary reviewed  Patient is not a current smoker  Induction- intravenous  Postoperative Plan- Plan for postoperative opioid use  Informed Consent- Anesthetic plan and risks discussed with patient  I personally reviewed this patient with the CRNA  Discussed and agreed on the Anesthesia Plan with the CRNA  Horacio Delgadillo

## 2023-05-02 NOTE — OP NOTE
OPERATIVE REPORT  PATIENT NAME: Brady Mercado    :  1970  MRN: [id number]  Pt Location: BE OR ROOM 05    SURGERY DATE: 2023    Surgeon(s) and Role:     * Roberth Bautista MD - Primary     * Maico Ramos MD - Assisting    Preop Diagnosis:  Hyperparathyroidism ([address] ) [E21 3]  Hypercalcemia [E83 52]  Multiple thyroid nodules [E04 2]    Post-Op Diagnosis Codes:     * Hyperparathyroidism ([address] ) [E21 3]     * Hypercalcemia [E83 52]     * Multiple thyroid nodules [E04 2]    Procedure(s):  Left - MINIMALLY INVASIVE LEFT PARATHYROIDECTOMY  POSSIBLE 4 GLAND EXPLORATION  MONITORING INTRAOPERATIVE PTH (PARATHYROID HORMONE)    Specimen(s):  ID Type Source Tests Collected by Time Destination   1 : left inferior left parathyroid Tissue Parathyroid TISSUE EXAM Roberth Bautista MD 2023 1335        Estimated Blood Loss:   Minimal    Drains:  * No LDAs found *    Anesthesia Type:   General    Operative Indications:  Hyperparathyroidism ([address] ) [E21 3]  Hypercalcemia [E83 52]  Multiple thyroid nodules [E04 2]      Operative Findings:  1220 mg left inferior parathyroid gland  Component      Latest Ref Rng & Units 2023          10:16 AM  1:40 PM  1:54 PM  2:01 PM   PARATHYROID HORMONE      18 4 - 80 1 pg/mL 127 3 (H) 580 3 (H) 148 5 (H) 112 4 (H)     Component      Latest Ref Rng & Units 2023           2:41 PM   PARATHYROID HORMONE      18 4 - 80 1 pg/mL 03 0     Complications:   None    Procedure and Technique:  The patient was brought to the OR and identified by proper time-out  Following this he was intubated by the anesthesia team, then was prepped and draped with the neck exposed in the extended position  Local anesthesia was given, then a 3 cm incision was made sharply 2 finger breaths above the sternal notch  Cautery was used to dissect through the dermis subcutaneous tissue  Wheatlanner retractor was placed  The platysma was then transected with cautery   Strap muscles were then divided the midline  This exposed the surface of the thyroid  We dissected the strap muscles off the anterolateral aspect of the left thyroid lobe  This enabled us to dissect between the thyroid and the strap muscles  The thyroid gland was then retracted medially and anteriorly which exposed what appeared to be a large parathyroid gland along the inferior pole of the thyroid gland  This was dissected out from surrounding tissue in hemostatic fashion with cautery  Vascular pedicle was visualized  The pedicle was clipped  PTH levels were drawn at the start of the case, 0 min, 5 min, 10 min, and 30 min after the resection  Levels decreased appropriately to normal range upon 10 min post resection of the gland  We then irrigated the field and closed using 3-0 Vicryl to close the strap muscles the midline followed by 3-0 Vicryl to close the platysma, followed by 4-0 Vicryl close the dermis, followed by 5-0 Monocryl to close skin in subcuticular fashion  Benzoin and Steri-Strips were then applied in the usual fashion  The patient was awakened transferred to the recovery room in stable condition  I was present for the entire procedure      Patient Disposition:  PACU         SIGNATURE: Lynda Martinez MD  DATE: May 2, 2023  TIME: 3:16 PM

## 2023-05-03 NOTE — DISCHARGE INSTRUCTIONS
Discharge instructions    If you experience tingling or numbness around your mouth, please take 2 Tums tablets and call the office immediately  1  General: You will feel pulling sensations around the wound and/or aches and pains around the incisions  This is normal  Even minor surgery is a change in your body and this is your bodys way of reaction to it  2  Wound care:    Glue and steri strips - Leave glue alone, it will fall off on its own, no need for an additional dressings  Leave the steri strips in place until they fall off on their own  3  Water: You may shower over the wound, unless there are drain tubes left in place  Do not bathe or use a pool or hot tub until cleared by the physician  You may shower right over the staples, glue or Steri-Strips and rinse wound with soapy water but do not scrub incision pat dry when you are done  4  Activity: You may go up and down stairs, walk as much as you are comfortable, but walk at least 3 times each day  If you have had abdominal or hernia surgery, do not lift anything heavier than 15 pounds for at least 4 weeks  5  Diet: You may resume a regular diet  If you had a same-day surgery or overnight stay surgery, you may wish to eat lightly for a few days: soups, crackers, and sandwiches  You may resume a regular diet when ready  6  Medications: Resume all of your previous medications, unless told otherwise by the doctor  Tylenol and ibuoprofen is always fine, unless you are taking any narcotic pain medication containing Tylenol (such as Percocet, Darvocet, Vicodin, or anything containing acetaminophen)  Do not take Tylenol if you're taking these medications  You do not need to take the narcotic pain medications unless you are having significant pain and discomfort  7  Driving: He will need someone to drive you home on the day of surgery   Do not drive or make any important decisions while on narcotic pain medication or 24 hours and after anesthesia or sedation for surgery  Generally, you may drive when your off all narcotic pain medications, and you can turn in your seat comfortably to check your blind spot  8  Upset Stomach: You may take Maalox, Tums, or similar items for an upset stomach  If your narcotic pain medication causes an upset stomach, do not take it on an empty stomach  Try taking it with at least some crackers or toast      9  Constipation: Patients often experienced constipation after surgery  You may take over-the-counter medication for this, such as Metamucil, Senokot, Dulcolax, milk of magnesia, etc  You may take a suppository unless you have had anorectal surgery such as a procedure on your hemorrhoids  If you experience significant nausea or vomiting after abdominal surgery, call the office before trying any of these medications  10  Call the office: If you are experiencing any of the following, fevers above 101 5°, significant nausea or vomiting, if the wound develops drainage and/or is excessive redness around the wound, or if you have significant diarrhea or other worsening symptoms  11  Pain: You may be given a prescription for pain  This will be given to the hospital, the day of surgery

## 2023-05-04 PROCEDURE — 88305 TISSUE EXAM BY PATHOLOGIST: CPT | Performed by: PATHOLOGY

## 2023-05-09 ENCOUNTER — TELEPHONE (OUTPATIENT)
Dept: OTHER | Facility: OTHER | Age: 53
End: 2023-05-09

## 2023-05-09 NOTE — TELEPHONE ENCOUNTER
Patient called saying that she had surgery on 5/2 and she is having dizziness and is light headed from the time she has been discharge from the hospital

## 2023-05-10 ENCOUNTER — APPOINTMENT (OUTPATIENT)
Dept: LAB | Facility: CLINIC | Age: 53
End: 2023-05-10

## 2023-05-10 DIAGNOSIS — E21.3 HYPERPARATHYROIDISM (HCC): ICD-10-CM

## 2023-05-10 DIAGNOSIS — E21.3 HYPERPARATHYROIDISM (HCC): Primary | ICD-10-CM

## 2023-05-10 LAB
CALCIUM SERPL-MCNC: 8.7 MG/DL (ref 8.4–10.2)
PTH-INTACT SERPL-MCNC: 87 PG/ML (ref 18.4–80.1)

## 2023-05-11 PROBLEM — E89.2 STATUS POST PARATHYROIDECTOMY (HCC): Status: ACTIVE | Noted: 2023-05-11

## 2023-05-11 PROBLEM — Z98.890 STATUS POST PARATHYROIDECTOMY: Status: ACTIVE | Noted: 2023-05-11

## 2023-05-11 PROBLEM — Z90.89 STATUS POST PARATHYROIDECTOMY: Status: ACTIVE | Noted: 2023-05-11

## 2023-05-15 ENCOUNTER — OFFICE VISIT (OUTPATIENT)
Dept: SURGICAL ONCOLOGY | Facility: CLINIC | Age: 53
End: 2023-05-15

## 2023-05-15 VITALS
OXYGEN SATURATION: 98 % | WEIGHT: 146 LBS | SYSTOLIC BLOOD PRESSURE: 110 MMHG | HEART RATE: 55 BPM | HEIGHT: 65 IN | RESPIRATION RATE: 16 BRPM | DIASTOLIC BLOOD PRESSURE: 74 MMHG | BODY MASS INDEX: 24.32 KG/M2 | TEMPERATURE: 98.4 F

## 2023-05-15 DIAGNOSIS — E89.2 STATUS POST PARATHYROIDECTOMY (HCC): Primary | ICD-10-CM

## 2023-05-15 NOTE — PROGRESS NOTES
Surgical Oncology Follow Up       [address] [facility] [place]  [address] [facility] [address]  [facility] [address]  Serafin Baker  1970  [id number]  [address] [facility] CANCER [facility] SURGICAL ONCOLOGY Maryann Watkins  [facility] [id number] [phone]    Chief Complaint   Patient presents with   • Post-op       Assessment/Plan:    No problem-specific Assessment & Plan notes found for this encounter  Diagnoses and all orders for this visit:    Status post parathyroidectomy ([facility] [address] )  -     PTH, intact; Future  -     Calcium; Future  -     Vitamin D Panel; Future  -     PTH, intact  -     Calcium        Advance Care Planning/Advance Directives:  Discussed disease status, cancer treatment plans and/or cancer treatment goals with the patient  Oncology History    No history exists  History of Present Illness: 80-year-old woman here for postop check status post left-sided parathyroidectomy   -Interval History: She had a little bit of dizziness postoperatively but is now doing better  Overall energy levels have improved significantly  Review of Systems:  Review of Systems   Constitutional: Negative  HENT: Negative  Negative for trouble swallowing and voice change  Eyes: Negative  Respiratory: Negative  Cardiovascular: Negative  Gastrointestinal: Negative  Endocrine: Negative  Genitourinary: Negative  Musculoskeletal: Negative  Skin: Negative  Allergic/Immunologic: Negative  Neurological: Positive for light-headedness  Hematological: Negative  Psychiatric/Behavioral: Negative  All other systems reviewed and are negative        Patient Active Problem List   Diagnosis   • Pap smear for cervical cancer screening   • Encounter for screening mammogram for malignant neoplasm of breast   • Dyspareunia in female   • Hypercalcemia   • Multiple thyroid nodules   • Migraine without aura and without status migrainosus, not intractable   • Vitamin D deficiency   • Hyperparathyroidism ([facility] [address] )   • Status post parathyroidectomy [facility])     Past Medical History:   Diagnosis Date   • Hyperlipidemia    • Personal history of COVID-19 2021     Past Surgical History:   Procedure Laterality Date   •  SECTION     • OOPHORECTOMY Right    • MN PARATHYROIDECTOMY/EXPLORATION PARATHYROIDS Left 2023    Procedure: MINIMALLY INVASIVE LEFT PARATHYROIDECTOMY,;  Surgeon: Alma Delia Edwards MD;  Location:  MAIN OR;  Service: Surgical Oncology   • US GUIDED THYROID BIOPSY  10/13/2022     Family History   Problem Relation Age of Onset   • Cancer Mother 72        Unknown type   • Diabetes type II Father    • Diabetes Father    • Clotting disorder Father    • Lung cancer Father 58        mesothylioma   • No Known Problems Sister    • No Known Problems Sister    • No Known Problems Brother      Social History     Socioeconomic History   • Marital status: /Civil Union     Spouse name: Not on file   • Number of children: Not on file   • Years of education: Not on file   • Highest education level: Not on file   Occupational History   • Not on file   Tobacco Use   • Smoking status: Never   • Smokeless tobacco: Never   Vaping Use   • Vaping Use: Never used   Substance and Sexual Activity   • Alcohol use: Not Currently   • Drug use: Never   • Sexual activity: Yes     Partners: Male   Other Topics Concern   • Not on file   Social History Narrative   • Not on file     Social Determinants of Health     Financial Resource Strain: Not on file   Food Insecurity: Not on file   Transportation Needs: Not on file   Physical Activity: Not on file   Stress: Not on file   Social Connections: Not on file   Intimate Partner Violence: Not on file   Housing Stability: Not on file       Current Outpatient Medications:   •  Ascorbic Acid (VITAMIN C PO), Take by mouth, Disp: , Rfl:   •  Cyanocobalamin (VITAMIN B-12 PO), Take by mouth, Disp: , Rfl:   •  Pyridoxine HCl (VITAMIN B-6 PO), Take by mouth, Disp: , Rfl:   •  VITAMIN D PO, Take by mouth in the morning, Disp: , Rfl:   •  estradiol (ESTRACE VAGINAL) 0 1 mg/g vaginal cream, Insert 1 g into the vagina 3 (three) times a week (Patient not taking: Reported on 1/4/2023), Disp: 42 5 g, Rfl: 3  •  traMADol (Ultram) 50 mg tablet, Take 1 tablet (50 mg total) by mouth every 6 (six) hours as needed for moderate pain (Patient not taking: Reported on 5/15/2023), Disp: 10 tablet, Rfl: 0  No Known Allergies  Vitals:    05/15/23 1431   BP: 110/74   Pulse: 55   Resp: 16   Temp: 98 4 °F (36 9 °C)   SpO2: 98%       Physical Exam  Vitals reviewed  Constitutional:       Appearance: Normal appearance  Neck:      Comments: Incision clean dry intact  Musculoskeletal:      Cervical back: Normal range of motion and neck supple  No rigidity or tenderness  Neurological:      Mental Status: She is alert  Results:  Labs:  Lab Results   Component Value Date    CALCIUM 8 7 05/10/2023     Case Report   Surgical Pathology Report                         Case: [id number]                                    Authorizing Provider: Rancho Alicea MD        Collected:           05/02/2023 2595               Ordering Location:     [facility]      Received:            05/02/2023 [facility]                                      Hospital Operating Room                                                       Pathologist:           Kavin Alcocer MD                                                                     Intraop:               Kavin Alcocer MD                                                                     Specimen:    Parathyroid, left inferior left parathyroid                                                Final Diagnosis   A   Parathyroid, left inferior, parathyroidectomy:  -   Hyperplastic parathyroid tissue, consistent with parathyroid adenoma, weighing 1 22 g   -   Adjacent normocellular parathyroid tissue with intraparenchymal fat       Interpretation performed at [facility] [address], [facility] [address]       Electronically signed by Basil Mo MD on 5/4/2023 at 10:00 AM   Comments: This is an appended report  These results have been appended to a previously preliminary verified report  Imaging  XR chest pa & lateral    Result Date: 4/28/2023  Narrative: CHEST INDICATION:   E21 3: Hyperparathyroidism, unspecified  Preoperative evaluation  COMPARISON: Chest radiograph from 5/2022  EXAM PERFORMED/VIEWS:  XR CHEST PA & LATERAL Images: 2 FINDINGS: Cardiomediastinal silhouette appears unremarkable  The lungs are clear  No pneumothorax or pleural effusion  Osseous structures appear within normal limits for patient age  Impression: No acute cardiopulmonary disease  Workstation performed: [id number]     I reviewed the above laboratory and imaging data  Discussion/Summary: 79-year-old woman status post left parathyroidectomy doing well  Follow-up in 6 months with blood work at that time to assess for durability of operation  All questions answered and copies of path report given her for her records

## 2023-05-15 NOTE — LETTER
May 15, 2023     [place], [address] [address]    Patient: Trice Serrato   YOB: 1970   Date of Visit: 5/15/2023       Dear Dr Mcnamara Horse:    Thank you for referring Sirisha Licona to me for evaluation  Below are my notes for this consultation  If you have questions, please do not hesitate to call me  I look forward to following your patient along with you  Sincerely,        Aria Byrd MD        CC: MD Aria Mcmillan MD  5/15/2023  2:57 PM  Sign when Signing Visit     Surgical Oncology Follow Up       [address] [facility] [place]  [address] [facility] [address]  [place] [address]  1970  [id number]  [address] [facility]  [address] [facility] [address] [facility]  [phone]    Chief Complaint   Patient presents with   • Post-op       Assessment/Plan:    No problem-specific Assessment & Plan notes found for this encounter  Diagnoses and all orders for this visit:    Status post parathyroidectomy ([facility] [address] )  -     PTH, intact; Future  -     Calcium; Future  -     Vitamin D Panel; Future  -     PTH, intact  -     Calcium       Advance Care Planning/Advance Directives:  Discussed disease status, cancer treatment plans and/or cancer treatment goals with the patient  Oncology History    No history exists  History of Present Illness: 26-year-old woman here for postop check status post left-sided parathyroidectomy   -Interval History: She had a little bit of dizziness postoperatively but is now doing better  Overall energy levels have improved significantly  Review of Systems:  Review of Systems   Constitutional: Negative  HENT: Negative  Negative for trouble swallowing and voice change  Eyes: Negative  Respiratory: Negative  Cardiovascular: Negative  Gastrointestinal: Negative  Endocrine: Negative  Genitourinary: Negative  Musculoskeletal: Negative  Skin: Negative  Allergic/Immunologic: Negative  Neurological: Positive for light-headedness  Hematological: Negative  Psychiatric/Behavioral: Negative  All other systems reviewed and are negative        Patient Active Problem List   Diagnosis   • Pap smear for cervical cancer screening   • Encounter for screening mammogram for malignant neoplasm of breast   • Dyspareunia in female   • Hypercalcemia   • Multiple thyroid nodules   • Migraine without aura and without status migrainosus, not intractable   • Vitamin D deficiency   • Hyperparathyroidism ([facility] [address] )   • [facility]     Past Medical History:   Diagnosis Date   • Hyperlipidemia    • Personal history of COVID-19 2021     Past Surgical History:   Procedure Laterality Date   •  SECTION     • OOPHORECTOMY Right    • MN PARATHYROIDECTOMY/EXPLORATION PARATHYROIDS Left 2023    Procedure: MINIMALLY INVASIVE LEFT PARATHYROIDECTOMY,;  Surgeon: Hunter Ennis MD;  Location: [facility];  Service: Surgical Oncology   • US GUIDED THYROID BIOPSY  10/13/2022     Family History   Problem Relation Age of Onset   • Cancer Mother 72        Unknown type   • Diabetes type II Father    • Diabetes Father    • Clotting disorder Father    • Lung cancer Father 58        mesothylioma   • No Known Problems Sister    • No Known Problems Sister    • No Known Problems Brother      Social History     Socioeconomic History   • Marital status: /Civil Union     Spouse name: Not on file   • Number of children: Not on file   • Years of education: Not on file   • Highest education level: Not on file   Occupational History   • Not on file   Tobacco Use   • Smoking status: Never   • Smokeless tobacco: Never   Vaping Use   • Vaping Use: Never used   Substance and Sexual Activity   • Alcohol use: Not Currently   • Drug use: Never   • Sexual activity: Yes Partners: Male   Other Topics Concern   • Not on file   Social History Narrative   • Not on file     Social Determinants of Health     Financial Resource Strain: Not on file   Food Insecurity: Not on file   Transportation Needs: Not on file   Physical Activity: Not on file   Stress: Not on file   Social Connections: Not on file   Intimate Partner Violence: Not on file   Housing Stability: Not on file       Current Outpatient Medications:   •  Ascorbic Acid (VITAMIN C PO), Take by mouth, Disp: , Rfl:   •  Cyanocobalamin (VITAMIN B-12 PO), Take by mouth, Disp: , Rfl:   •  Pyridoxine HCl (VITAMIN B-6 PO), Take by mouth, Disp: , Rfl:   •  VITAMIN D PO, Take by mouth in the morning, Disp: , Rfl:   •  estradiol (ESTRACE VAGINAL) 0 1 mg/g vaginal cream, Insert 1 g into the vagina 3 (three) times a week (Patient not taking: Reported on 1/4/2023), Disp: 42 5 g, Rfl: 3  •  traMADol (Ultram) 50 mg tablet, Take 1 tablet (50 mg total) by mouth every 6 (six) hours as needed for moderate pain (Patient not taking: Reported on 5/15/2023), Disp: 10 tablet, Rfl: 0  No Known Allergies  Vitals:    05/15/23 1431   BP: 110/74   Pulse: 55   Resp: 16   Temp: 98 4 °F (36 9 °C)   SpO2: 98%       Physical Exam  Vitals reviewed  Constitutional:       Appearance: Normal appearance  Neck:      Comments: Incision clean dry intact  Musculoskeletal:      Cervical back: Normal range of motion and neck supple  No rigidity or tenderness  Neurological:      Mental Status: She is alert             Results:  Labs:  Lab Results   Component Value Date    CALCIUM 8 7 05/10/2023     Case Report   Surgical Pathology Report                         Case: [id number]                                    Authorizing Provider: Briscoe Mortimer, MD        Collected:           05/02/2023 3049               Ordering Location:     [address] [place] [address]      Received:            05/02/2023 1342                                      Hospital Operating Room                                                       Pathologist:           Iwona Hernandez MD                                                                     Intraop:               Iwona Hernandez MD                                                                     Specimen:    Parathyroid, left inferior left parathyroid                                                Final Diagnosis   A  Parathyroid, left inferior, parathyroidectomy:  -   Hyperplastic parathyroid tissue, consistent with parathyroid adenoma, weighing 1 22 g   -   Adjacent normocellular parathyroid tissue with intraparenchymal fat       Interpretation performed at [facility] [address]  [facility] [address] [address] [address]       Electronically signed by Iwona Hernandez MD on 5/4/2023 at 10:00 AM   Comments: This is an appended report  These results have been appended to a previously preliminary verified report  Imaging  XR chest pa & lateral    Result Date: 4/28/2023  Narrative: CHEST INDICATION:   E21 3: Hyperparathyroidism, unspecified  Preoperative evaluation  COMPARISON: Chest radiograph from 5/2022  EXAM PERFORMED/VIEWS:  XR CHEST PA & LATERAL Images: 2 FINDINGS: Cardiomediastinal silhouette appears unremarkable  The lungs are clear  No pneumothorax or pleural effusion  Osseous structures appear within normal limits for patient age  Impression: No acute cardiopulmonary disease  Workstation performed: [id number]     I reviewed the above laboratory and imaging data  Discussion/Summary: 65-year-old woman status post left parathyroidectomy doing well  Follow-up in 6 months with blood work at that time to assess for durability of operation  All questions answered and copies of path report given her for her records

## 2023-05-18 LAB
25(OH)D2 SERPL-MCNC: <1 NG/ML
25(OH)D3 SERPL-MCNC: 28 NG/ML
25(OH)D3+25(OH)D2 SERPL-MCNC: 28 NG/ML

## 2023-09-07 ENCOUNTER — OFFICE VISIT (OUTPATIENT)
Dept: FAMILY MEDICINE CLINIC | Facility: CLINIC | Age: 53
End: 2023-09-07
Payer: COMMERCIAL

## 2023-09-07 ENCOUNTER — TELEPHONE (OUTPATIENT)
Dept: FAMILY MEDICINE CLINIC | Facility: CLINIC | Age: 53
End: 2023-09-07

## 2023-09-07 VITALS
HEIGHT: 65 IN | BODY MASS INDEX: 27.32 KG/M2 | SYSTOLIC BLOOD PRESSURE: 120 MMHG | OXYGEN SATURATION: 99 % | DIASTOLIC BLOOD PRESSURE: 76 MMHG | HEART RATE: 91 BPM | WEIGHT: 164 LBS

## 2023-09-07 DIAGNOSIS — Z13.29 SCREENING FOR THYROID DISORDER: ICD-10-CM

## 2023-09-07 DIAGNOSIS — E21.3 HYPERPARATHYROIDISM (HCC): ICD-10-CM

## 2023-09-07 DIAGNOSIS — Z12.11 ENCOUNTER FOR SCREENING COLONOSCOPY: ICD-10-CM

## 2023-09-07 DIAGNOSIS — F32.A ANXIETY AND DEPRESSION: Primary | ICD-10-CM

## 2023-09-07 DIAGNOSIS — E83.52 HYPERCALCEMIA: ICD-10-CM

## 2023-09-07 DIAGNOSIS — Z13.0 SCREENING FOR DEFICIENCY ANEMIA: ICD-10-CM

## 2023-09-07 DIAGNOSIS — E78.5 DYSLIPIDEMIA: ICD-10-CM

## 2023-09-07 DIAGNOSIS — F41.9 ANXIETY AND DEPRESSION: Primary | ICD-10-CM

## 2023-09-07 DIAGNOSIS — R73.03 PRE-DIABETES: ICD-10-CM

## 2023-09-07 DIAGNOSIS — E55.9 VITAMIN D DEFICIENCY: ICD-10-CM

## 2023-09-07 DIAGNOSIS — G43.009 MIGRAINE WITHOUT AURA AND WITHOUT STATUS MIGRAINOSUS, NOT INTRACTABLE: ICD-10-CM

## 2023-09-07 PROCEDURE — 99214 OFFICE O/P EST MOD 30 MIN: CPT | Performed by: INTERNAL MEDICINE

## 2023-09-07 RX ORDER — VITAMIN B COMPLEX
1 CAPSULE ORAL DAILY
COMMUNITY

## 2023-09-07 RX ORDER — ESCITALOPRAM OXALATE 10 MG/1
10 TABLET ORAL DAILY
Qty: 30 TABLET | Refills: 2 | Status: SHIPPED | OUTPATIENT
Start: 2023-09-07 | End: 2024-03-05

## 2023-09-07 NOTE — ASSESSMENT & PLAN NOTE
As mentioned in HPI patient recently has been feeling more depressed. Sometimes difficulty with sleeping in the night sometimes withdrawn. We will start the patient on Lexapro 10 mg daily. And see the response side effects explained. Evaluation with a psychologist if necessary.

## 2023-09-07 NOTE — ASSESSMENT & PLAN NOTE
Status post parathyroid surgery on calcium and vitamin D supplements going for follow-up calcium levels as well as PTH levels

## 2023-09-07 NOTE — ASSESSMENT & PLAN NOTE
Patient underwent parathyroid surgery calcium levels came down currently she is on supplements 200 mg of calcium every day along with a vitamin D

## 2023-09-07 NOTE — PROGRESS NOTES
Office Visit Note  23     Zaki Oliver 50 y.o. female MRN: [id number]  : 1970    Assessment:     1. Anxiety and depression  Assessment & Plan:  As mentioned in HPI patient recently has been feeling more depressed. Sometimes difficulty with sleeping in the night sometimes withdrawn. We will start the patient on Lexapro 10 mg daily. And see the response side effects explained. 2. Hypercalcemia  Assessment & Plan:  Patient underwent parathyroid surgery calcium levels came down currently she is on supplements 200 mg of calcium every day along with a vitamin D      3. Hyperparathyroidism [facility])  Assessment & Plan:  Status post parathyroid surgery on calcium and vitamin D supplements going for follow-up calcium levels as well as PTH levels      4. Migraine without aura and without status migrainosus, not intractable  Assessment & Plan:  Her migraine headaches have subsided greatly after she had the parathyroid surgery. 5. Vitamin D deficiency      BMI Counseling: Body mass index is 27.29 kg/m². The BMI is above normal. Nutrition recommendations include decreasing portion sizes, decreasing fast food intake, consuming healthier snacks, moderation in carbohydrate intake and reducing intake of cholesterol. Exercise recommendations include moderate physical activity 150 minutes/week. No pharmacotherapy was ordered. Rationale for BMI follow-up plan is due to patient being overweight or obese. Depression Screening and Follow-up Plan: Patient was screened for depression during today's encounter. They screened negative with a PHQ-2 score of 1. Discussion Summary and Plan: Today's care plan and medications were reviewed with patient in detail and all their questions answered to their satisfaction.     Chief Complaint   Patient presents with   • Follow-up      Subjective:  Patient is coming here for a follow-up evaluation recently underwent parathyroidectomy for hypercalcemia for hyperparathyroidism successfully and is being followed by the endocrinologist as well as the surgical oncologist.  Recently patient has been not feeling well in terms of feeling depression anxiety. She has been getting disturbed sleep at times she feels very down she was trying to avoid medication but it is getting difficult for her to get around. Occasionally she gets chest pain palpitation symptoms she has been getting irregular periods Is being followed by the gynecologist also. History of hypercholesterolemia in the past did not take the medication as prescribed in the past that she was afraid of side effects and then she has been on a good diet lost weight but she has started to gain it back again. The following portions of the patient's history were reviewed and updated as appropriate: allergies, current medications, past family history, past medical history, past social history, past surgical history and problem list.    Review of Systems   Constitutional: Negative for chills and fever. HENT: Negative for ear pain and sore throat. Eyes: Negative for pain and visual disturbance. Respiratory: Negative for cough and shortness of breath. Cardiovascular: Negative for chest pain and palpitations. Gastrointestinal: Negative for abdominal pain and vomiting. Genitourinary: Negative for dysuria and hematuria. Musculoskeletal: Negative for arthralgias and back pain. Skin: Negative for color change and rash. Neurological: Negative for seizures and syncope. All other systems reviewed and are negative.         Historical Information   Patient Active Problem List   Diagnosis   • Pap smear for cervical cancer screening   • Encounter for screening mammogram for malignant neoplasm of breast   • Dyspareunia in female   • Hypercalcemia   • Multiple thyroid nodules   • Migraine without aura and without status migrainosus, not intractable   • Vitamin D deficiency   • Hyperparathyroidism ([address])   • Status post parathyroidectomy ([address] [facility])   • Anxiety and depression     Past Medical History:   Diagnosis Date   • Hyperlipidemia    • Personal history of COVID-19 2021     Past Surgical History:   Procedure Laterality Date   •  SECTION     • OOPHORECTOMY Right    • [place] PARATHYROIDECTOMY/EXPLORATION PARATHYROIDS Left 2023    Procedure: MINIMALLY INVASIVE LEFT PARATHYROIDECTOMY,;  Surgeon: Zak Rae MD;  Location: [facility];  Service: Surgical Oncology   • US GUIDED THYROID BIOPSY  10/13/2022     Social History     Substance and Sexual Activity   Alcohol Use Not Currently     Social History     Substance and Sexual Activity   Drug Use Never     Social History     Tobacco Use   Smoking Status Never   • Passive exposure: Never   Smokeless Tobacco Never     Family History   Problem Relation Age of Onset   • Cancer Mother 72        Unknown type   • Diabetes type II Father    • Diabetes Father    • Clotting disorder Father    • Lung cancer Father 58        mesothylioma   • No Known Problems Sister    • No Known Problems Sister    • No Known Problems Brother      Health Maintenance Due   Topic   • Hepatitis C Screening    • COVID-19 Vaccine (1)   • HIV Screening    • BMI: Followup Plan    • DTaP,Tdap,and Td Vaccines (1 - Tdap)   • Colorectal Cancer Screening    • Annual Physical    • Breast Cancer Screening: Mammogram    • Influenza Vaccine (1)      Meds/Allergies       Current Outpatient Medications:   •  Ascorbic Acid (VITAMIN C PO), Take by mouth, Disp: , Rfl:   •  b complex vitamins capsule, Take 1 capsule by mouth daily, Disp: , Rfl:   •  VITAMIN D PO, Take by mouth in the morning, Disp: , Rfl:       Objective:    Vitals:   /76 (BP Location: Right arm, Patient Position: Sitting, Cuff Size: Standard)   Pulse 91   Ht 5' 5" (1.651 m)   Wt 74.4 kg (164 lb 0.1 oz)   SpO2 99%   BMI 27.29 kg/m²   Body mass index is 27.29 kg/m².   Vitals:    23 0843   Weight: 74.4 kg (164 lb 0.1 oz) Physical Exam  Vitals and nursing note reviewed. Constitutional:       Appearance: Normal appearance. Cardiovascular:      Rate and Rhythm: Normal rate and regular rhythm. Heart sounds: Normal heart sounds. Pulmonary:      Effort: Pulmonary effort is normal.      Breath sounds: Normal breath sounds. Musculoskeletal:      Cervical back: Normal range of motion and neck supple. Right lower leg: No edema. Left lower leg: No edema. Neurological:      Mental Status: She is alert. Lab Review   No visits with results within 2 Month(s) from this visit. Latest known visit with results is:   Appointment on 05/10/2023   Component Date Value Ref Range Status   • 25-HYDROXY VIT D 05/10/2023 28 (L)  ng/mL Final    Reference Range: All Ages: Target levels 30 - 100   • 25-Hydroxy D2 05/10/2023 <1.0  ng/mL Final    This test was developed and its performance characteristics  determined by [address] [place] [address]. It has not been cleared or approved  by the Food and Drug Administration. • 25-HYDROXY VIT D3 05/10/2023 28  ng/mL Final    This test was developed and its performance characteristics  determined by [address] [place] [address]. It has not been cleared or approved  by the Food and Drug Administration. • PTH 05/10/2023 87.0 (H)  18.4 - 80.1 pg/mL Final   • Calcium 05/10/2023 8.7  8.4 - 10.2 mg/dL Final         Hazel Buchanan MD        "This note has been constructed using a voice recognition system. Therefore there may be syntax, spelling, and/or grammatical errors.  Please call if you have any questions. "

## 2023-10-11 ENCOUNTER — TELEMEDICINE (OUTPATIENT)
Dept: FAMILY MEDICINE CLINIC | Facility: CLINIC | Age: 53
End: 2023-10-11
Payer: COMMERCIAL

## 2023-10-11 ENCOUNTER — HOSPITAL ENCOUNTER (OUTPATIENT)
Dept: MAMMOGRAPHY | Facility: HOSPITAL | Age: 53
Discharge: HOME | End: 2023-10-11
Attending: OBSTETRICS & GYNECOLOGY
Payer: COMMERCIAL

## 2023-10-11 VITALS — WEIGHT: 164.02 LBS | HEIGHT: 65 IN | BODY MASS INDEX: 27.33 KG/M2

## 2023-10-11 DIAGNOSIS — J01.80 ACUTE NON-RECURRENT SINUSITIS OF OTHER SINUS: Primary | ICD-10-CM

## 2023-10-11 DIAGNOSIS — Z12.31 ENCOUNTER FOR SCREENING MAMMOGRAM FOR MALIGNANT NEOPLASM OF BREAST: ICD-10-CM

## 2023-10-11 DIAGNOSIS — F41.9 ANXIETY AND DEPRESSION: ICD-10-CM

## 2023-10-11 DIAGNOSIS — F32.A ANXIETY AND DEPRESSION: ICD-10-CM

## 2023-10-11 PROCEDURE — 77063 BREAST TOMOSYNTHESIS BI: CPT

## 2023-10-11 PROCEDURE — 77067 SCR MAMMO BI INCL CAD: CPT

## 2023-10-11 PROCEDURE — 99213 OFFICE O/P EST LOW 20 MIN: CPT | Performed by: INTERNAL MEDICINE

## 2023-10-11 RX ORDER — AMOXICILLIN AND CLAVULANATE POTASSIUM 875; 125 MG/1; MG/1
1 TABLET, FILM COATED ORAL EVERY 12 HOURS SCHEDULED
Qty: 20 TABLET | Refills: 0 | Status: SHIPPED | OUTPATIENT
Start: 2023-10-11 | End: 2023-10-21

## 2023-10-11 NOTE — PROGRESS NOTES
Virtual Regular Visit    Verification of patient location:    Patient is located at Home in the following state in which I hold an active license [place]      Assessment/Plan:    Problem List Items Addressed This Visit          Respiratory    Other acute sinusitis - Primary     Patient with sinus congestion postnasal dripping cough bringing up greenish phlegm will prescribe Augmentin 875 mg twice a day with food and Flonase nasal spray         Relevant Medications    amoxicillin-clavulanate (AUGMENTIN) 875-125 mg per tablet       Other    Anxiety and depression     Patient with anxiety and depression on Lexapro which was started a month ago 10 mg daily we will continue the same we will reevaluate in the office if necessary adjust the medication dosage. Reason for visit is   Chief Complaint   Patient presents with    Virtual Brief Visit          Encounter provider Gustavo Lezama MD    Provider located at [address] [place] [address]  [phone]      Recent Visits  Date Type Provider Dept   10/11/23 Telemedicine Kalie Swanson MD  [facility]   Showing recent visits within past 7 days and meeting all other requirements  Future Appointments  No visits were found meeting these conditions. Showing future appointments within next 150 days and meeting all other requirements       The patient was identified by name and date of birth. Shabnam Oneal was informed that this is a telemedicine visit and that the visit is being conducted through the [employer]. She agrees to proceed. .  My office door was closed. No one else was in the room. She acknowledged consent and understanding of privacy and security of the video platform. The patient has agreed to participate and understands they can discontinue the visit at any time. Patient is aware this is a billable service. Keli Raza is a 50 y.o. female. Patient has chief complaint of sinus congestion postnasal dripping cough bringing up greenish phlegm no fever COVID test was negative no one else in the family got sick recently. This is a virtual visit patient does not appear to be in any pain or distress. Past Medical History:   Diagnosis Date    Hyperlipidemia     Personal history of COVID-19 2021       Past Surgical History:   Procedure Laterality Date     SECTION      OOPHORECTOMY Right     age 41    [place] PARATHYROIDECTOMY/EXPLORATION PARATHYROIDS Left 2023    Procedure: MINIMALLY INVASIVE LEFT PARATHYROIDECTOMY,;  Surgeon: Aurora Mac MD;  Location: [facility];  Service: Surgical Oncology    US GUIDED THYROID BIOPSY  10/13/2022       Current Outpatient Medications   Medication Sig Dispense Refill    amoxicillin-clavulanate (AUGMENTIN) 875-125 mg per tablet Take 1 tablet by mouth every 12 (twelve) hours for 10 days 20 tablet 0    Ascorbic Acid (VITAMIN C PO) Take by mouth      b complex vitamins capsule Take 1 capsule by mouth daily      escitalopram (LEXAPRO) 10 mg tablet Take 1 tablet (10 mg total) by mouth daily 30 tablet 2    VITAMIN D PO Take by mouth in the morning       No current facility-administered medications for this visit. No Known Allergies    Review of Systems   Constitutional:  Negative for chills and fever. HENT:  Positive for congestion and sinus pain. Negative for dental problem, drooling, ear discharge, ear pain, facial swelling, hearing loss, mouth sores, nosebleeds, postnasal drip, rhinorrhea, sinus pressure, sneezing, sore throat, tinnitus, trouble swallowing and voice change. Eyes:  Negative for pain and visual disturbance. Respiratory:  Positive for cough (greenish). Negative for shortness of breath. Cardiovascular:  Negative for chest pain and palpitations. Gastrointestinal:  Negative for abdominal pain and vomiting. Genitourinary:  Negative for dysuria and hematuria. Musculoskeletal:  Negative for arthralgias and back pain. Skin:  Negative for color change and rash. Neurological:  Negative for seizures and syncope. All other systems reviewed and are negative. Video Exam    There were no vitals filed for this visit. Physical Exam   Patient does not appear to be in any pain or distress at this time this is a virtual visit complete physical examination could not be done.   Visit Time  Total Visit [id number]

## 2023-10-11 NOTE — ASSESSMENT & PLAN NOTE
Patient with sinus congestion postnasal dripping cough bringing up greenish phlegm will prescribe Augmentin 875 mg twice a day with food and Flonase nasal spray

## 2023-10-14 NOTE — ASSESSMENT & PLAN NOTE
Patient with anxiety and depression on Lexapro which was started a month ago 10 mg daily we will continue the same we will reevaluate in the office if necessary adjust the medication dosage.

## 2023-10-16 ENCOUNTER — TELEPHONE (OUTPATIENT)
Age: 53
End: 2023-10-16

## 2023-10-16 NOTE — TELEPHONE ENCOUNTER
Scheduled date of colonoscopy (as of today): 11/8/23    Physician performing colonoscopy: CHRISSY    Location of colonoscopy: [facility]    Bowel prep : SENT MESSAGE TO OFFICE---Pt is requesting Sutab.  States she can't drink all the required liquid    Instructions reviewed with patient by: N/A    Clearances:  N/A

## 2023-10-17 DIAGNOSIS — Z12.11 COLON CANCER SCREENING: Primary | ICD-10-CM

## 2023-10-18 ENCOUNTER — HOSPITAL ENCOUNTER (OUTPATIENT)
Dept: ULTRASOUND IMAGING | Facility: HOSPITAL | Age: 53
Discharge: HOME | End: 2023-10-18
Attending: INTERNAL MEDICINE
Payer: COMMERCIAL

## 2023-10-18 ENCOUNTER — APPOINTMENT (OUTPATIENT)
Dept: LAB | Facility: CLINIC | Age: 53
End: 2023-10-18
Payer: COMMERCIAL

## 2023-10-18 DIAGNOSIS — E78.5 DYSLIPIDEMIA: ICD-10-CM

## 2023-10-18 DIAGNOSIS — R73.03 PRE-DIABETES: ICD-10-CM

## 2023-10-18 DIAGNOSIS — E04.2 MULTIPLE THYROID NODULES: ICD-10-CM

## 2023-10-18 DIAGNOSIS — Z13.0 SCREENING FOR DEFICIENCY ANEMIA: ICD-10-CM

## 2023-10-18 DIAGNOSIS — E89.2 STATUS POST PARATHYROIDECTOMY (HCC): ICD-10-CM

## 2023-10-18 DIAGNOSIS — Z13.29 SCREENING FOR THYROID DISORDER: Primary | ICD-10-CM

## 2023-10-18 LAB
ALBUMIN SERPL BCP-MCNC: 4.5 G/DL (ref 3.5–5)
ALP SERPL-CCNC: 67 U/L (ref 34–104)
ALT SERPL W P-5'-P-CCNC: 10 U/L (ref 7–52)
ANION GAP SERPL CALCULATED.3IONS-SCNC: 5 MMOL/L
AST SERPL W P-5'-P-CCNC: 16 U/L (ref 13–39)
BASOPHILS # BLD AUTO: 0.05 THOUSANDS/ÂΜL (ref 0–0.1)
BASOPHILS NFR BLD AUTO: 1 % (ref 0–1)
BILIRUB SERPL-MCNC: 0.36 MG/DL (ref 0.2–1)
BILIRUB UR QL STRIP: NEGATIVE
BUN SERPL-MCNC: 18 MG/DL (ref 5–25)
CALCIUM SERPL-MCNC: 9.6 MG/DL (ref 8.4–10.2)
CHLORIDE SERPL-SCNC: 100 MMOL/L (ref 96–108)
CHOLEST SERPL-MCNC: 196 MG/DL
CLARITY UR: CLEAR
CO2 SERPL-SCNC: 29 MMOL/L (ref 21–32)
COLOR UR: COLORLESS
CREAT SERPL-MCNC: 0.61 MG/DL (ref 0.6–1.3)
EOSINOPHIL # BLD AUTO: 0.25 THOUSAND/ÂΜL (ref 0–0.61)
EOSINOPHIL NFR BLD AUTO: 5 % (ref 0–6)
ERYTHROCYTE [DISTWIDTH] IN BLOOD BY AUTOMATED COUNT: 12.2 % (ref 11.6–15.1)
EST. AVERAGE GLUCOSE BLD GHB EST-MCNC: 114 MG/DL
GFR SERPL CREATININE-BSD FRML MDRD: 106 ML/MIN/1.73SQ M
GLUCOSE P FAST SERPL-MCNC: 83 MG/DL (ref 65–99)
GLUCOSE UR STRIP-MCNC: NEGATIVE MG/DL
HBA1C MFR BLD: 5.6 %
HCT VFR BLD AUTO: 41.9 % (ref 34.8–46.1)
HDLC SERPL-MCNC: 60 MG/DL
HGB BLD-MCNC: 13.7 G/DL (ref 11.5–15.4)
HGB UR QL STRIP.AUTO: NEGATIVE
IMM GRANULOCYTES # BLD AUTO: 0.01 THOUSAND/UL (ref 0–0.2)
IMM GRANULOCYTES NFR BLD AUTO: 0 % (ref 0–2)
KETONES UR STRIP-MCNC: NEGATIVE MG/DL
LDLC SERPL CALC-MCNC: 120 MG/DL (ref 0–100)
LEUKOCYTE ESTERASE UR QL STRIP: NEGATIVE
LYMPHOCYTES # BLD AUTO: 2.13 THOUSANDS/ÂΜL (ref 0.6–4.47)
LYMPHOCYTES NFR BLD AUTO: 41 % (ref 14–44)
MCH RBC QN AUTO: 29.9 PG (ref 26.8–34.3)
MCHC RBC AUTO-ENTMCNC: 32.7 G/DL (ref 31.4–37.4)
MCV RBC AUTO: 92 FL (ref 82–98)
MONOCYTES # BLD AUTO: 0.39 THOUSAND/ÂΜL (ref 0.17–1.22)
MONOCYTES NFR BLD AUTO: 8 % (ref 4–12)
NEUTROPHILS # BLD AUTO: 2.4 THOUSANDS/ÂΜL (ref 1.85–7.62)
NEUTS SEG NFR BLD AUTO: 45 % (ref 43–75)
NITRITE UR QL STRIP: NEGATIVE
NONHDLC SERPL-MCNC: 136 MG/DL
NRBC BLD AUTO-RTO: 0 /100 WBCS
PH UR STRIP.AUTO: 6 [PH]
PLATELET # BLD AUTO: 243 THOUSANDS/UL (ref 149–390)
PMV BLD AUTO: 11.2 FL (ref 8.9–12.7)
POTASSIUM SERPL-SCNC: 4.1 MMOL/L (ref 3.5–5.3)
PROT SERPL-MCNC: 7.5 G/DL (ref 6.4–8.4)
PROT UR STRIP-MCNC: NEGATIVE MG/DL
PTH-INTACT SERPL-MCNC: 43.2 PG/ML (ref 12–88)
RBC # BLD AUTO: 4.58 MILLION/UL (ref 3.81–5.12)
SODIUM SERPL-SCNC: 134 MMOL/L (ref 135–147)
SP GR UR STRIP.AUTO: 1 (ref 1–1.03)
TRIGL SERPL-MCNC: 80 MG/DL
TSH SERPL DL<=0.05 MIU/L-ACNC: 3.42 UIU/ML (ref 0.45–4.5)
UROBILINOGEN UR STRIP-ACNC: <2 MG/DL
WBC # BLD AUTO: 5.23 THOUSAND/UL (ref 4.31–10.16)

## 2023-10-18 PROCEDURE — 83036 HEMOGLOBIN GLYCOSYLATED A1C: CPT

## 2023-10-18 PROCEDURE — 84443 ASSAY THYROID STIM HORMONE: CPT

## 2023-10-18 PROCEDURE — 81003 URINALYSIS AUTO W/O SCOPE: CPT

## 2023-10-18 PROCEDURE — 80061 LIPID PANEL: CPT

## 2023-10-18 PROCEDURE — 80053 COMPREHEN METABOLIC PANEL: CPT

## 2023-10-18 PROCEDURE — 36415 COLL VENOUS BLD VENIPUNCTURE: CPT

## 2023-10-18 PROCEDURE — 83970 ASSAY OF PARATHORMONE: CPT

## 2023-10-18 PROCEDURE — 85025 COMPLETE CBC W/AUTO DIFF WBC: CPT

## 2023-10-18 PROCEDURE — 76536 US EXAM OF HEAD AND NECK: CPT

## 2023-10-18 PROCEDURE — 82306 VITAMIN D 25 HYDROXY: CPT

## 2023-10-20 NOTE — RESULT ENCOUNTER NOTE
Thyroid ultrasound reviewed-small right-sided thyroid nodule does not meet criteria for biopsy or further imaging

## 2023-10-23 ENCOUNTER — OFFICE VISIT (OUTPATIENT)
Dept: FAMILY MEDICINE CLINIC | Facility: CLINIC | Age: 53
End: 2023-10-23
Payer: COMMERCIAL

## 2023-10-23 VITALS
HEIGHT: 65 IN | OXYGEN SATURATION: 98 % | WEIGHT: 158.6 LBS | SYSTOLIC BLOOD PRESSURE: 110 MMHG | DIASTOLIC BLOOD PRESSURE: 70 MMHG | HEART RATE: 88 BPM | BODY MASS INDEX: 26.42 KG/M2

## 2023-10-23 DIAGNOSIS — E78.5 DYSLIPIDEMIA: ICD-10-CM

## 2023-10-23 DIAGNOSIS — F32.A ANXIETY AND DEPRESSION: Primary | ICD-10-CM

## 2023-10-23 DIAGNOSIS — E21.3 HYPERPARATHYROIDISM (HCC): ICD-10-CM

## 2023-10-23 DIAGNOSIS — E04.2 MULTIPLE THYROID NODULES: ICD-10-CM

## 2023-10-23 DIAGNOSIS — Z23 ENCOUNTER FOR IMMUNIZATION: ICD-10-CM

## 2023-10-23 DIAGNOSIS — J01.80 ACUTE NON-RECURRENT SINUSITIS OF OTHER SINUS: ICD-10-CM

## 2023-10-23 DIAGNOSIS — G43.009 MIGRAINE WITHOUT AURA AND WITHOUT STATUS MIGRAINOSUS, NOT INTRACTABLE: ICD-10-CM

## 2023-10-23 DIAGNOSIS — F41.9 ANXIETY AND DEPRESSION: Primary | ICD-10-CM

## 2023-10-23 PROBLEM — Z90.89 STATUS POST PARATHYROIDECTOMY: Status: RESOLVED | Noted: 2023-05-11 | Resolved: 2023-10-23

## 2023-10-23 PROBLEM — Z98.890 STATUS POST PARATHYROIDECTOMY: Status: RESOLVED | Noted: 2023-05-11 | Resolved: 2023-10-23

## 2023-10-23 PROBLEM — E89.2 STATUS POST PARATHYROIDECTOMY (HCC): Status: RESOLVED | Noted: 2023-05-11 | Resolved: 2023-10-23

## 2023-10-23 PROCEDURE — 99214 OFFICE O/P EST MOD 30 MIN: CPT | Performed by: INTERNAL MEDICINE

## 2023-10-23 PROCEDURE — 90471 IMMUNIZATION ADMIN: CPT

## 2023-10-23 PROCEDURE — 90686 IIV4 VACC NO PRSV 0.5 ML IM: CPT

## 2023-10-23 RX ORDER — ESCITALOPRAM OXALATE 10 MG/1
10 TABLET ORAL DAILY
Qty: 90 TABLET | Refills: 0 | Status: SHIPPED | OUTPATIENT
Start: 2023-10-23 | End: 2024-04-20

## 2023-10-23 NOTE — ASSESSMENT & PLAN NOTE
Patient with a history of hypercalcemia secondary to parathyroid adenoma for which she underwent surgery last calcium level came back normal at 9.6 we will continue to monitor with periodic calcium levels

## 2023-10-23 NOTE — PROGRESS NOTES
Office Visit Note  10/23/23     Allegra Muhammad 50 y.o. female MRN: [id number]  : 1970    Assessment:     1. Anxiety and depression  Assessment & Plan:  Patient with anxiety and depression on Lexapro appears to be helping we will continue the same dose of 10 mg daily      2. Encounter for immunization  -     influenza vaccine, quadrivalent, 0.5 mL, preservative-free, for adult and pediatric patients 6 mos+ (AFLURIA, FLUARIX, FLULAVAL, FLUZONE)    3. Hyperparathyroidism ([address])  Assessment & Plan:  Status post parathyroidectomy on vitamin D supplements      4. Migraine without aura and without status migrainosus, not intractable  Assessment & Plan:  Patient on and off still gets a migraine headache she is not taking any medications as she was concerned about cross reaction with Lexapro but she can take Tylenol if needed. 5. Multiple thyroid nodules  Assessment & Plan:  Patient had an ultrasound of the thyroid done left side thyroid nodule not present after the surgery for the parathyroidectomy right side there is a nodule 0.8 cm does not meet the criteria for the needle biopsy we will follow-up with repeat thyroid ultrasound in 1 year time. 6. Acute non-recurrent sinusitis of other sinus  Assessment & Plan:  Resolved                 Discussion Summary and Plan: Today's care plan and medications were reviewed with patient in detail and all their questions answered to their satisfaction. Chief Complaint   Patient presents with    Follow-up      Subjective:  Patient is coming in for a follow-up evaluation recently had a sinus infection which got treated with antibiotics. History of anxiety and depression currently taking Lexapro appears to be helping. Status post parathyroidectomy stable being followed by the surgeon. Medications reviewed labs reviewed. Patient had a mammogram done which came back unremarkable. Patient is going to have a colonoscopy done in the month of November.   Patient's LDL cholesterol level is coming up high at 120 emphasized regarding diet exercise HDL came down. We will follow with repeat lab in few months from now. The following portions of the patient's history were reviewed and updated as appropriate: allergies, current medications, past family history, past medical history, past social history, past surgical history and problem list.    Review of Systems   Constitutional:  Negative for chills and fever. HENT:  Negative for ear pain and sore throat. Eyes:  Negative for pain and visual disturbance. Respiratory:  Negative for cough and shortness of breath. Cardiovascular:  Negative for chest pain and palpitations. Gastrointestinal:  Negative for abdominal pain and vomiting. Genitourinary:  Negative for dysuria and hematuria. Musculoskeletal:  Negative for arthralgias and back pain. Skin:  Negative for color change and rash. Neurological:  Negative for seizures and syncope. All other systems reviewed and are negative.         Historical Information   Patient Active Problem List   Diagnosis    Pap smear for cervical cancer screening    Encounter for screening mammogram for malignant neoplasm of breast    Dyspareunia in female    Hypercalcemia    Multiple thyroid nodules    Migraine without aura and without status migrainosus, not intractable    Vitamin D deficiency    Hyperparathyroidism ([address])    Anxiety and depression    Other acute sinusitis     Past Medical History:   Diagnosis Date    Hyperlipidemia     Personal history of COVID-19 2021    Status post parathyroidectomy ([address]) 2023     Past Surgical History:   Procedure Laterality Date     SECTION      OOPHORECTOMY Right     age 41    [place] PARATHYROIDECTOMY/EXPLORATION PARATHYROIDS Left 2023    Procedure: MINIMALLY INVASIVE LEFT PARATHYROIDECTOMY,;  Surgeon: Zak Rae MD;  Location: [facility];  Service: Surgical Oncology    US GUIDED THYROID BIOPSY  10/13/2022     Social History     Substance and Sexual Activity   Alcohol Use Not Currently     Social History     Substance and Sexual Activity   Drug Use Never     Social History     Tobacco Use   Smoking Status Never    Passive exposure: Never   Smokeless Tobacco Never     Family History   Problem Relation Age of Onset    Cancer Mother 72        Unknown type    Diabetes type II Father     Diabetes Father     Clotting disorder Father     Lung cancer Father 58        mesothylioma    No Known Problems Sister     No Known Problems Sister     No Known Problems Daughter     No Known Problems Brother      Health Maintenance Due   Topic    Hepatitis C Screening     COVID-19 Vaccine (1)    HIV Screening     DTaP,Tdap,and Td Vaccines (1 - Tdap)    Colorectal Cancer Screening     Annual Physical       Meds/Allergies       Current Outpatient Medications:     Ascorbic Acid (VITAMIN C PO), Take by mouth, Disp: , Rfl:     b complex vitamins capsule, Take 1 capsule by mouth daily, Disp: , Rfl:     escitalopram (LEXAPRO) 10 mg tablet, Take 1 tablet (10 mg total) by mouth daily, Disp: 30 tablet, Rfl: 2    Sodium Sulfate-Mag Sulfate-KCl 5736-277-007 MG TABS, Take 24 tablets by mouth see administration instructions See instructions from the office. , Disp: 24 tablet, Rfl: 0    VITAMIN D PO, Take by mouth in the morning, Disp: , Rfl:       Objective:    Vitals:   /70 (BP Location: Right arm, Patient Position: Sitting, Cuff Size: Standard)   Pulse 88   Ht 5' 5" (1.651 m)   Wt 71.9 kg (158 lb 9.6 oz)   LMP 08/01/2023 (Approximate)   SpO2 98%   BMI 26.39 kg/m²   Body mass index is 26.39 kg/m². Vitals:    10/23/23 0803   Weight: 71.9 kg (158 lb 9.6 oz)       Physical Exam  Vitals and nursing note reviewed. Constitutional:       Appearance: Normal appearance. Cardiovascular:      Rate and Rhythm: Normal rate and regular rhythm. Heart sounds: Normal heart sounds.    Pulmonary:      Effort: Pulmonary effort is normal.      Breath sounds: Normal breath sounds. Musculoskeletal:      Cervical back: Normal range of motion and neck supple. Right lower leg: No edema. Left lower leg: No edema. Skin:     General: Skin is warm and dry. Neurological:      Mental Status: She is alert and oriented to person, place, and time. Lab Review   Appointment on 10/18/2023   Component Date Value Ref Range Status    Hemoglobin A1C 10/18/2023 5.6  Normal 4.0-5.6%; PreDiabetic 5.7-6.4%; Diabetic >=6.5%; Glycemic control for adults with diabetes <7.0% % Final    EAG 10/18/2023 114  mg/dl Final    Color, UA 10/18/2023 Colorless   Final    Clarity, UA 10/18/2023 Clear   Final    Specific Gravity, UA 10/18/2023 1.004  1.003 - 1.030 Final    pH, UA 10/18/2023 6.0  4.5, 5.0, 5.5, 6.0, 6.5, 7.0, 7.5, 8.0 Final    Leukocytes, UA 10/18/2023 Negative  Negative Final    Nitrite, UA 10/18/2023 Negative  Negative Final    Protein, UA 10/18/2023 Negative  Negative mg/dl Final    Glucose, UA 10/18/2023 Negative  Negative mg/dl Final    Ketones, UA 10/18/2023 Negative  Negative mg/dl Final    Urobilinogen, UA 10/18/2023 <2.0  <2.0 mg/dl mg/dl Final    Bilirubin, UA 10/18/2023 Negative  Negative Final    Occult Blood, UA 10/18/2023 Negative  Negative Final    PTH 10/18/2023 43.2  12.0 - 88.0 pg/mL Final    TSH 3RD GENERATON 10/18/2023 3.417  0.450 - 4.500 uIU/mL Final    The recommended reference ranges for TSH during pregnancy are as follows:   First trimester 0.100 to 2.500 uIU/mL   Second trimester  0.200 to 3.000 uIU/mL   Third trimester 0.300 to 3.000 uIU/m    Note: Normal ranges may not apply to patients who are transgender, non-binary, or whose legal sex, sex at birth, and gender identity differ. Adult TSH (3rd generation) reference range follows the recommended guidelines of the American Thy[employer] Association, January, 2020.     Cholesterol 10/18/2023 196  See Comment mg/dL Final    Cholesterol:         Pediatric <18 Years        Desirable          <170 mg/dL Borderline High    170-199 mg/dL      High               >=200 mg/dL        Adult >=18 Years            Desirable         <200 mg/dL      Borderline High   200-239 mg/dL      High              >239 mg/dL      Triglycerides 10/18/2023 80  See Comment mg/dL Final    Triglyceride:     0-9Y            <75mg/dL     10Y-17Y         <90 mg/dL       >=18Y     Normal          <150 mg/dL     Borderline High 150-199 mg/dL     High            200-499 mg/dL        Very High       >499 mg/dL    Specimen collection should occur prior to Metamizole administration due to the potential for falsely depressed results. HDL, Direct 10/18/2023 60  >=50 mg/dL Final    LDL Calculated 10/18/2023 120 (H)  0 - 100 mg/dL Final    LDL Cholesterol:     Optimal           <100 mg/dl     Near Optimal      100-129 mg/dl     Above Optimal       Borderline High 130-159 mg/dl       High            160-189 mg/dl       Very High       >189 mg/dl         This screening LDL is a calculated result. It does not have the accuracy of the Direct Measured LDL in the monitoring of patients with hyperlipidemia and/or statin therapy. Direct Measure LDL (AWO910) must be ordered separately in these patients. Non-HDL-Chol (CHOL-HDL) 10/18/2023 136  mg/dl Final    Sodium 10/18/2023 134 (L)  135 - 147 mmol/L Final    Potassium 10/18/2023 4.1  3.5 - 5.3 mmol/L Final    Chloride 10/18/2023 100  96 - 108 mmol/L Final    CO2 10/18/2023 29  21 - 32 mmol/L Final    ANION GAP 10/18/2023 5  mmol/L Final    BUN 10/18/2023 18  5 - 25 mg/dL Final    Creatinine 10/18/2023 0.61  0.60 - 1.30 mg/dL Final    Standardized to IDMS reference method    Glucose, Fasting 10/18/2023 83  65 - 99 mg/dL Final    Calcium 10/18/2023 9.6  8.4 - 10.2 mg/dL Final    AST 10/18/2023 16  13 - 39 U/L Final    ALT 10/18/2023 10  7 - 52 U/L Final    Specimen collection should occur prior to Sulfasalazine administration due to the potential for falsely depressed results.      Alkaline Phosphatase 10/18/2023 67  34 - 104 U/L Final    Total Protein 10/18/2023 7.5  6.4 - 8.4 g/dL Final    Albumin 10/18/2023 4.5  3.5 - 5.0 g/dL Final    Total Bilirubin 10/18/2023 0.36  0.20 - 1.00 mg/dL Final    Use of this assay is not recommended for patients undergoing treatment with eltrombopag due to the potential for falsely elevated results. N-acetyl-p-benzoquinone imine (metabolite of Acetaminophen) will generate erroneously low results in samples for patients that have taken an overdose of Acetaminophen. eGFR 10/18/2023 106  ml/min/1.73sq m Final    WBC 10/18/2023 5.23  4.31 - 10.16 Thousand/uL Final    RBC 10/18/2023 4.58  3.81 - 5.12 Million/uL Final    Hemoglobin 10/18/2023 13.7  11.5 - 15.4 g/dL Final    Hematocrit 10/18/2023 41.9  34.8 - 46.1 % Final    MCV 10/18/2023 92  82 - 98 fL Final    MCH 10/18/2023 29.9  26.8 - 34.3 pg Final    MCHC 10/18/2023 32.7  31.4 - 37.4 g/dL Final    RDW 10/18/2023 12.2  11.6 - 15.1 % Final    MPV 10/18/2023 11.2  8.9 - 12.7 fL Final    Platelets 43/98/5275 243  149 - 390 Thousands/uL Final    nRBC 10/18/2023 0  /100 WBCs Final    Neutrophils Relative 10/18/2023 45  43 - 75 % Final    Immat GRANS % 10/18/2023 0  0 - 2 % Final    Lymphocytes Relative 10/18/2023 41  14 - 44 % Final    Monocytes Relative 10/18/2023 8  4 - 12 % Final    Eosinophils Relative 10/18/2023 5  0 - 6 % Final    Basophils Relative 10/18/2023 1  0 - 1 % Final    Neutrophils Absolute 10/18/2023 2.40  1.85 - 7.62 Thousands/µL Final    Immature Grans Absolute 10/18/2023 0.01  0.00 - 0.20 Thousand/uL Final    Lymphocytes Absolute 10/18/2023 2.13  0.60 - 4.47 Thousands/µL Final    Monocytes Absolute 10/18/2023 0.39  0.17 - 1.22 Thousand/µL Final    Eosinophils Absolute 10/18/2023 0.25  0.00 - 0.61 Thousand/µL Final    Basophils Absolute 10/18/2023 0.05  0.00 - 0.10 Thousands/µL Final         Kalie Connolly MD        "This note has been constructed using a voice recognition system. Therefore there may be syntax, spelling, and/or grammatical errors.  Please call if you have any questions. "

## 2023-10-23 NOTE — ASSESSMENT & PLAN NOTE
Patient on and off still gets a migraine headache she is not taking any medications as she was concerned about cross reaction with Lexapro but she can take Tylenol if needed.

## 2023-10-23 NOTE — ASSESSMENT & PLAN NOTE
Patient had an ultrasound of the thyroid done left side thyroid nodule not present after the surgery for the parathyroidectomy right side there is a nodule 0.8 cm does not meet the criteria for the needle biopsy we will follow-up with repeat thyroid ultrasound in 1 year time.

## 2023-10-23 NOTE — ASSESSMENT & PLAN NOTE
Patient with anxiety and depression on Lexapro appears to be helping we will continue the same dose of 10 mg daily

## 2023-10-23 NOTE — ASSESSMENT & PLAN NOTE
Lipid profile shows cholesterol at 196 it was 210 triglycerides 80 HDL at 68 was 95 before and LDL went up to 128 was 104 before emphasize regarding diet exercise lifestyle modification we will repeat the labs in few months from now.

## 2023-10-25 LAB
25(OH)D2 SERPL-MCNC: <1 NG/ML
25(OH)D3 SERPL-MCNC: 38 NG/ML
25(OH)D3+25(OH)D2 SERPL-MCNC: 38 NG/ML

## 2023-11-06 ENCOUNTER — TELEPHONE (OUTPATIENT)
Dept: HEMATOLOGY ONCOLOGY | Facility: CLINIC | Age: 53
End: 2023-11-06

## 2023-11-09 ENCOUNTER — TELEPHONE (OUTPATIENT)
Dept: SURGICAL ONCOLOGY | Facility: CLINIC | Age: 53
End: 2023-11-09

## 2023-11-09 NOTE — TELEPHONE ENCOUNTER
Called patient reviewed results of lab work. Explained that her PTH, calcium and vitamin D levels are all within normal range. I did advise her to take a vitamin D supplement as her levels are in the lower normal range. She denies any issues with the incision or difficulty swallowing, and she has had no postoperative issues. Canceled upcoming appointment. Advised her to call should she have any further questions or concerns.

## 2023-12-10 PROBLEM — J01.80 OTHER ACUTE SINUSITIS: Status: RESOLVED | Noted: 2023-10-11 | Resolved: 2023-12-10

## 2024-01-16 DIAGNOSIS — F32.A ANXIETY AND DEPRESSION: ICD-10-CM

## 2024-01-16 DIAGNOSIS — F41.9 ANXIETY AND DEPRESSION: ICD-10-CM

## 2024-01-16 RX ORDER — ESCITALOPRAM OXALATE 10 MG/1
10 TABLET ORAL DAILY
Qty: 90 TABLET | Refills: 1 | Status: SHIPPED | OUTPATIENT
Start: 2024-01-16

## 2024-01-26 ENCOUNTER — ANNUAL EXAM (OUTPATIENT)
Dept: OBGYN CLINIC | Facility: CLINIC | Age: 54
End: 2024-01-26
Payer: COMMERCIAL

## 2024-01-26 VITALS
WEIGHT: 163.6 LBS | HEIGHT: 65 IN | SYSTOLIC BLOOD PRESSURE: 110 MMHG | DIASTOLIC BLOOD PRESSURE: 80 MMHG | BODY MASS INDEX: 27.26 KG/M2

## 2024-01-26 DIAGNOSIS — Z01.419 ENCOUNTER FOR GYNECOLOGICAL EXAMINATION WITHOUT ABNORMAL FINDING: Primary | ICD-10-CM

## 2024-01-26 DIAGNOSIS — Z12.31 ENCOUNTER FOR SCREENING MAMMOGRAM FOR MALIGNANT NEOPLASM OF BREAST: ICD-10-CM

## 2024-01-26 DIAGNOSIS — R92.30 DENSE BREAST: ICD-10-CM

## 2024-01-26 DIAGNOSIS — R92.2 DENSE BREAST: ICD-10-CM

## 2024-01-26 PROCEDURE — S0612 ANNUAL GYNECOLOGICAL EXAMINA: HCPCS | Performed by: OBSTETRICS & GYNECOLOGY

## 2024-01-26 PROCEDURE — G0476 HPV COMBO ASSAY CA SCREEN: HCPCS | Performed by: OBSTETRICS & GYNECOLOGY

## 2024-01-26 PROCEDURE — G0145 SCR C/V CYTO,THINLAYER,RESCR: HCPCS | Performed by: OBSTETRICS & GYNECOLOGY

## 2024-01-26 NOTE — PROGRESS NOTES
Subjective    Nancy Luna is a 53 y.o. G1, P1 female who presents for annual well woman exam. Patient reports no bleeding, spotting, or discharge.  Patient reports Yes  hot flashes/night sweats, severe burning pain unable to be sexually active, sleeping fairly well, wakes easily but is usually able to get back to sleep.  Reviewed with patient names of Ashwagandha as well as options for vaginal dryness including Femring and Vagifem patient did not prefer the cream felt that it was ineffective.  Reviewed dense breast tissue with patient she would like ABUS ordered and will likely schedule this with her next mammogram.      Current contraception: post menopausal status, tubal ligation  History of abnormal Pap smear: no  Family history of uterine or ovarian cancer: no  Regular self breast exam: yes  History of abnormal mammogram: no  Family history of breast cancer: no    Menstrual History:  OB History          1    Para   1    Term   0            AB        Living   1         SAB        IAB        Ectopic        Multiple        Live Births   1                Menarche age: 16  Patient's last menstrual period was 2023 (exact date).       The following portions of the patient's history were reviewed and updated as appropriate: allergies, current medications, past family history, past medical history, past social history, past surgical history, and problem list.  Past Medical History:   Diagnosis Date    Depression Unsure    Feel down alot, feel lonely    Hyperlipidemia     Kidney stone Unsure    Personal history of COVID-19 2021    Status post parathyroidectomy ([facility]) 2023     Past Surgical History:   Procedure Laterality Date     SECTION      OOPHORECTOMY Right     age 47    [place] PARATHYROIDECTOMY/EXPLORATION PARATHYROIDS Left 2023    Procedure: MINIMALLY INVASIVE LEFT PARATHYROIDECTOMY,;  Surgeon: Ranjan Young MD;  Location: [facility];  Service: Surgical Oncology    US  "GUIDED THYROID BIOPSY  10/13/2022     OB History          1    Para   1    Term   0            AB        Living   1         SAB        IAB        Ectopic        Multiple        Live Births   1                 Review of Systems  Review of Systems   Constitutional: Negative.  Negative for chills and fever.   HENT: Negative.  Negative for ear pain and sore throat.    Eyes: Negative.  Negative for pain and visual disturbance.   Respiratory: Negative.  Negative for cough and shortness of breath.    Cardiovascular: Negative.  Negative for chest pain and palpitations.   Gastrointestinal: Negative.  Negative for abdominal pain and vomiting.   Genitourinary: Negative.  Negative for dysuria and hematuria.   Musculoskeletal: Negative.  Negative for arthralgias and back pain.   Skin: Negative.  Negative for color change and rash.   Neurological: Negative.  Negative for seizures and syncope.   All other systems reviewed and are negative.     Objective      /80 (BP Location: Left arm, Patient Position: Sitting, Cuff Size: Standard)   Ht 5' 5\" (1.651 m)   Wt 74.2 kg (163 lb 9.6 oz)   LMP 2023 (Exact Date)   BMI 27.22 kg/m²   Vitals:    24 1335   BP: 110/80   BP Location: Left arm   Patient Position: Sitting   Cuff Size: Standard   Weight: 74.2 kg (163 lb 9.6 oz)   Height: 5' 5\" (1.651 m)     General:   alert and oriented, in no acute distress   Heart: regular rate and rhythm, S1, S2 normal, no murmur, click, rub or gallop   Lungs: clear to auscultation bilaterally   Abdomen: soft, non-tender, without masses or organomegaly   Vulva: normal   Vagina: normal mucosa, mild to moderate atrophic changes no palpable lesion significant discomfort with exam   Cervix: no cervical motion tenderness and no lesions   Uterus: normal size, mobile, non-tender   Adnexa: normal adnexa and no mass, fullness, tenderness   Breast inspection negative, no nipple discharge or bleeding, no masses or nodularity " palpable  Rectal negative, stool guaiac negative  Thyroid normal no masses or nodules     Assessment   53-year-old G1, P1 here for annual exam still with significant atrophic vaginitis and burning discomfort still with significant hot flashes and night sweats.  Last Pap April 2019 normal, last mammogram October 2023 normal with dense breast     Plan   Wrote down the names for Ashwagondha, Femring and Vagifem for patient.  Pediatric Graves use, ThinPrep with cotesting performed, given slip for mammogram and ABUS return in 1 year or sooner as needed

## 2024-01-31 LAB
LAB AP GYN PRIMARY INTERPRETATION: NORMAL
Lab: NORMAL

## 2024-04-26 ENCOUNTER — TELEPHONE (OUTPATIENT)
Dept: FAMILY MEDICINE CLINIC | Facility: CLINIC | Age: 54
End: 2024-04-26

## 2024-04-26 ENCOUNTER — TELEPHONE (OUTPATIENT)
Age: 54
End: 2024-04-26

## 2024-04-26 NOTE — TELEPHONE ENCOUNTER
Patient calling to find out what allergy medication she can take since she is on Lexapro. Please advise  and call patient back.  Yvonne Painter

## 2024-05-21 DIAGNOSIS — F41.9 ANXIETY AND DEPRESSION: ICD-10-CM

## 2024-05-21 DIAGNOSIS — F32.A ANXIETY AND DEPRESSION: ICD-10-CM

## 2024-05-22 RX ORDER — ESCITALOPRAM OXALATE 10 MG/1
10 TABLET ORAL DAILY
Qty: 90 TABLET | Refills: 1 | Status: SHIPPED | OUTPATIENT
Start: 2024-05-22

## 2024-06-21 ENCOUNTER — TELEPHONE (OUTPATIENT)
Age: 54
End: 2024-06-21

## 2024-07-24 ENCOUNTER — TELEPHONE (OUTPATIENT)
Age: 54
End: 2024-07-24

## 2024-07-24 NOTE — TELEPHONE ENCOUNTER
Pt called to ask if she can take Midol while being on Lexapro. She is aware Dr Connolly is not in the office this week and would like another provider to give her an answer. Please advise.

## 2024-11-12 ENCOUNTER — OFFICE VISIT (OUTPATIENT)
Age: 54
End: 2024-11-12
Payer: COMMERCIAL

## 2024-11-12 VITALS — BODY MASS INDEX: 27.49 KG/M2 | WEIGHT: 165 LBS | HEIGHT: 65 IN

## 2024-11-12 DIAGNOSIS — M99.01 SEGMENTAL DYSFUNCTION OF CERVICAL REGION: ICD-10-CM

## 2024-11-12 DIAGNOSIS — M99.03 SEGMENTAL DYSFUNCTION OF LUMBAR REGION: ICD-10-CM

## 2024-11-12 DIAGNOSIS — M99.04 SEGMENTAL DYSFUNCTION OF SACRAL REGION: ICD-10-CM

## 2024-11-12 DIAGNOSIS — M99.02 SEGMENTAL DYSFUNCTION OF THORACIC REGION: Primary | ICD-10-CM

## 2024-11-12 DIAGNOSIS — M54.59 MECHANICAL LOW BACK PAIN: ICD-10-CM

## 2024-11-12 DIAGNOSIS — M50.30 DDD (DEGENERATIVE DISC DISEASE), CERVICAL: ICD-10-CM

## 2024-11-12 PROCEDURE — 99203 OFFICE O/P NEW LOW 30 MIN: CPT | Performed by: CHIROPRACTOR

## 2024-11-12 PROCEDURE — 98942 CHIROPRACTIC MANJ 5 REGIONS: CPT | Performed by: CHIROPRACTOR

## 2024-11-12 NOTE — PROGRESS NOTES
Diagnoses and all orders for this visit:    Segmental dysfunction of thoracic region    Segmental dysfunction of lumbar region    Segmental dysfunction of cervical region    Segmental dysfunction of sacral region    DDD (degenerative disc disease), cervical    Mechanical low back pain      ASSESSMENT:  No red flags, radiculopathy or neurologic deficit appreciated clinically. Pt's symptoms and exam findings consistent with degenerative disc disease in cervical spine and  lower back pain secondary to repetitive st/sp injury, exacerbated by postural/ergonomic stressors. Pt responded well to stretches and manual mobilization of the affected spinal and myofascial tissues with increased ROM; trial of conservative tx recommended consisting of stretching, ther-ex, graded mobilization/manipulation of the affected spinal/myofascial tissues, postural/ergonomic education and take home stretches/exercises. If symptoms fail to improve with short trial of conservative care, appropriate imaging and referral will be coordinated.  Spent greater than 30 min c pt discussing hx, pe, ddx, tx options and reviewing notes/imaging    PROCEDURE CODES: 15022, 14263    TREATMENT:  Fear avoidance behavior discussion, encouraged and reassured pt that natural course of condition is to improve over time with adherence to tx plan and home care strategies. Home care recommendations: avoid bed rest, walk (but avoid trails and uneven surfaces), gradual return to activity to tolerance (avoid anything that peripheralizes symptoms), ice 20 min on/off, watch for ice burn, call if symptoms peripheralize, worsen, or neurologic deficit progresses. Ther-ex: IASTM - discussed post procedure soreness and/or ecchymosis for up to 36 hrs, applied to affected mm hypertonicities; wall afshan, axial retraction, upper trap stretch, lev scap stretch, SCM stretch, lat rows with t-band, lat pull down with t-band, abdominal bracing; greater than 15 min spent  performing above mentioned ther-ex. Thoracic mobilization/manipulation: prone P-A mob,; cervical mobilization/manipulation: diversified supine graded mobilization, cervical traction, prone C/T jct HVLA, Lumbar mobilization/ Manipulation- Flexion -distraction, Shirley Drop HVLA    HPI:  Nancy Luna is a 54 y.o. female   Chief Complaint   Patient presents with    Neck - Pain    Lower Back - Pain    Middle Back - Pain     The patient presents to the office with upper back and lower back pain. The patient has chronic pain in the upper back that has been aggravated by heavy lifting. Constant pain- aching. No radiation into upper back. Neck surgery May 2023 - parathyroid and had weakness with washing hair. Self care is stretching. Exercise routine- no routine, 11 and 9 y/o grand-kids. House stuff during her days. Scrubbing and reaching bothers it more. Walk frequency was better 3 months. 3/10 in the upper back and up to a 5-6/10.  The patient lower back-sitting-more than right away. No treatment. 3/14/23-CT scan on 6-7 with moderate narrowing on R and mild in L foramina      Past Medical History:   Diagnosis Date    Depression Unsure    Feel down alot, feel lonely    Hyperlipidemia     Kidney stone Unsure    Personal history of COVID-19 12/2021    Status post parathyroidectomy 05/11/2023      The following portions of the patient's history were reviewed and updated as appropriate: allergies, past family history, past medical history, past social history, past surgical history, and problem list.  Review of Systems   Constitutional:  Negative for activity change, fatigue, fever and unexpected weight change.   HENT:  Negative for ear pain, hearing loss, sinus pressure, sinus pain, sore throat and tinnitus.    Respiratory:  Negative for chest tightness, shortness of breath, wheezing and stridor.    Cardiovascular:  Negative for chest pain.   Genitourinary:  Negative for flank pain and frequency.   Musculoskeletal:   Positive for arthralgias, myalgias, neck pain and neck stiffness. Negative for back pain and joint swelling.   Skin:  Negative for color change and pallor.   Neurological:  Negative for dizziness, speech difficulty, weakness, numbness and headaches.   Psychiatric/Behavioral:  Negative for agitation and sleep disturbance. The patient is not nervous/anxious.      Physical Exam  Constitutional:       General: She is not in acute distress.     Appearance: Normal appearance.   HENT:      Head: Normocephalic.      Mouth/Throat:      Mouth: Mucous membranes are moist.   Eyes:      Extraocular Movements: Extraocular movements intact.      Conjunctiva/sclera: Conjunctivae normal.      Pupils: Pupils are equal, round, and reactive to light.   Neck:      Vascular: No carotid bruit.   Pulmonary:      Effort: Pulmonary effort is normal.   Chest:      Chest wall: No tenderness.   Abdominal:      General: Abdomen is flat.      Palpations: Abdomen is soft.   Musculoskeletal:         General: Tenderness present. No swelling, deformity or signs of injury. Normal range of motion.        Arms:       Cervical back: Normal range of motion. Rigidity and tenderness present.      Right lower leg: No edema.      Left lower leg: No edema.   Lymphadenopathy:      Cervical: No cervical adenopathy.   Skin:     General: Skin is warm.      Coloration: Skin is not jaundiced or pale.      Findings: No bruising or erythema.   Neurological:      Mental Status: She is alert and oriented to person, place, and time.      Cranial Nerves: No cranial nerve deficit.      Sensory: No sensory deficit.      Motor: No weakness.      Gait: Gait is intact.      Deep Tendon Reflexes: Reflexes are normal and symmetric.   Psychiatric:         Attention and Perception: Attention normal.         Mood and Affect: Mood and affect normal.         Speech: Speech normal.         Behavior: Behavior normal. Behavior is cooperative.         Thought Content: Thought content  normal.         Cognition and Memory: Cognition normal.         Judgment: Judgment normal.       SOFT TISSUE ASSESSMENT: Hypertonicity and tenderness palpated C5-T6 erector spinae, upper traps, lev scap, SCM, scalene, rhomboid, suboccipitals T10-S1 Paraspinals, QL, R/L psoas, glutes, piriformis JOINT RECTRICTIONS: C5-T6 T10-S1, R SIJ ORTHO: Analisa unremarkable for centralization/peripheralization; max foraminal comp elicits local np R/L; shoulder depression elicits stiffness in R/L upper trap; brachial plexus tension test elicits neural tension in R/L UE; cervical distraction elicits relieves CC, SLUMP- neg, Sitting ROOT- neg, FABERE- R discomfort, SLR- Tightness , Iliac compression- R, Claire- Neg, Iliac distraction- R discomfort    Return in about 1 week (around 11/19/2024) for Recheck.

## 2024-11-13 ENCOUNTER — APPOINTMENT (EMERGENCY)
Dept: CT IMAGING | Facility: HOSPITAL | Age: 54
End: 2024-11-13
Payer: COMMERCIAL

## 2024-11-13 ENCOUNTER — HOSPITAL ENCOUNTER (EMERGENCY)
Facility: HOSPITAL | Age: 54
Discharge: HOME | End: 2024-11-13
Attending: EMERGENCY MEDICINE | Admitting: EMERGENCY MEDICINE
Payer: COMMERCIAL

## 2024-11-13 VITALS
HEART RATE: 105 BPM | TEMPERATURE: 98.5 F | DIASTOLIC BLOOD PRESSURE: 63 MMHG | SYSTOLIC BLOOD PRESSURE: 112 MMHG | OXYGEN SATURATION: 98 % | BODY MASS INDEX: 27.32 KG/M2 | HEIGHT: 65 IN | RESPIRATION RATE: 20 BRPM | WEIGHT: 164 LBS

## 2024-11-13 DIAGNOSIS — R10.84 GENERALIZED ABDOMINAL PAIN: Primary | ICD-10-CM

## 2024-11-13 DIAGNOSIS — R11.2 NAUSEA AND VOMITING: ICD-10-CM

## 2024-11-13 LAB
ALBUMIN SERPL BCG-MCNC: 4.4 G/DL (ref 3.5–5)
ALP SERPL-CCNC: 77 U/L (ref 34–104)
ALT SERPL W P-5'-P-CCNC: 18 U/L (ref 7–52)
ANION GAP SERPL CALCULATED.3IONS-SCNC: 7 MMOL/L (ref 4–13)
AST SERPL W P-5'-P-CCNC: 19 U/L (ref 13–39)
BACTERIA UR QL AUTO: ABNORMAL /HPF
BASOPHILS # BLD AUTO: 0.02 THOUSANDS/ÂΜL (ref 0–0.1)
BASOPHILS NFR BLD AUTO: 0 % (ref 0–1)
BILIRUB SERPL-MCNC: 0.48 MG/DL (ref 0.2–1)
BILIRUB UR QL STRIP: NEGATIVE
BUN SERPL-MCNC: 19 MG/DL (ref 5–25)
CALCIUM SERPL-MCNC: 9 MG/DL (ref 8.4–10.2)
CHLORIDE SERPL-SCNC: 103 MMOL/L (ref 96–108)
CLARITY UR: CLEAR
CO2 SERPL-SCNC: 27 MMOL/L (ref 21–32)
COLOR UR: ABNORMAL
CREAT SERPL-MCNC: 0.7 MG/DL (ref 0.6–1.3)
EOSINOPHIL # BLD AUTO: 0.11 THOUSAND/ÂΜL (ref 0–0.61)
EOSINOPHIL NFR BLD AUTO: 1 % (ref 0–6)
ERYTHROCYTE [DISTWIDTH] IN BLOOD BY AUTOMATED COUNT: 12.8 % (ref 11.6–15.1)
GFR SERPL CREATININE-BSD FRML MDRD: 100 ML/MIN/1.73SQ M
GLUCOSE SERPL-MCNC: 131 MG/DL (ref 65–140)
GLUCOSE UR STRIP-MCNC: ABNORMAL MG/DL
HCT VFR BLD AUTO: 40.7 % (ref 34.8–46.1)
HGB BLD-MCNC: 13.1 G/DL (ref 11.5–15.4)
HGB UR QL STRIP.AUTO: ABNORMAL
IMM GRANULOCYTES # BLD AUTO: 0.04 THOUSAND/UL (ref 0–0.2)
IMM GRANULOCYTES NFR BLD AUTO: 0 % (ref 0–2)
KETONES UR STRIP-MCNC: NEGATIVE MG/DL
LEUKOCYTE ESTERASE UR QL STRIP: NEGATIVE
LIPASE SERPL-CCNC: 26 U/L (ref 11–82)
LYMPHOCYTES # BLD AUTO: 0.26 THOUSANDS/ÂΜL (ref 0.6–4.47)
LYMPHOCYTES NFR BLD AUTO: 3 % (ref 14–44)
MCH RBC QN AUTO: 29.8 PG (ref 26.8–34.3)
MCHC RBC AUTO-ENTMCNC: 32.2 G/DL (ref 31.4–37.4)
MCV RBC AUTO: 93 FL (ref 82–98)
MONOCYTES # BLD AUTO: 0.29 THOUSAND/ÂΜL (ref 0.17–1.22)
MONOCYTES NFR BLD AUTO: 3 % (ref 4–12)
MUCOUS THREADS UR QL AUTO: ABNORMAL
NEUTROPHILS # BLD AUTO: 9.7 THOUSANDS/ÂΜL (ref 1.85–7.62)
NEUTS SEG NFR BLD AUTO: 93 % (ref 43–75)
NITRITE UR QL STRIP: NEGATIVE
NON-SQ EPI CELLS URNS QL MICRO: ABNORMAL /HPF
NRBC BLD AUTO-RTO: 0 /100 WBCS
PH UR STRIP.AUTO: 5 [PH]
PLATELET # BLD AUTO: 234 THOUSANDS/UL (ref 149–390)
PLATELET BLD QL SMEAR: ADEQUATE
PMV BLD AUTO: 10.2 FL (ref 8.9–12.7)
POTASSIUM SERPL-SCNC: 3.7 MMOL/L (ref 3.5–5.3)
PROT SERPL-MCNC: 7.5 G/DL (ref 6.4–8.4)
PROT UR STRIP-MCNC: ABNORMAL MG/DL
RBC # BLD AUTO: 4.4 MILLION/UL (ref 3.81–5.12)
RBC #/AREA URNS AUTO: ABNORMAL /HPF
RBC MORPH BLD: NORMAL
SODIUM SERPL-SCNC: 137 MMOL/L (ref 135–147)
SP GR UR STRIP.AUTO: 1.03 (ref 1–1.03)
UROBILINOGEN UR STRIP-ACNC: <2 MG/DL
WBC # BLD AUTO: 10.42 THOUSAND/UL (ref 4.31–10.16)
WBC #/AREA URNS AUTO: ABNORMAL /HPF

## 2024-11-13 PROCEDURE — 96376 TX/PRO/DX INJ SAME DRUG ADON: CPT

## 2024-11-13 PROCEDURE — 99284 EMERGENCY DEPT VISIT MOD MDM: CPT

## 2024-11-13 PROCEDURE — 81001 URINALYSIS AUTO W/SCOPE: CPT | Performed by: EMERGENCY MEDICINE

## 2024-11-13 PROCEDURE — 96374 THER/PROPH/DIAG INJ IV PUSH: CPT

## 2024-11-13 PROCEDURE — 85025 COMPLETE CBC W/AUTO DIFF WBC: CPT | Performed by: EMERGENCY MEDICINE

## 2024-11-13 PROCEDURE — 99284 EMERGENCY DEPT VISIT MOD MDM: CPT | Performed by: EMERGENCY MEDICINE

## 2024-11-13 PROCEDURE — 36415 COLL VENOUS BLD VENIPUNCTURE: CPT | Performed by: EMERGENCY MEDICINE

## 2024-11-13 PROCEDURE — 83690 ASSAY OF LIPASE: CPT | Performed by: EMERGENCY MEDICINE

## 2024-11-13 PROCEDURE — 80053 COMPREHEN METABOLIC PANEL: CPT | Performed by: EMERGENCY MEDICINE

## 2024-11-13 PROCEDURE — 74177 CT ABD & PELVIS W/CONTRAST: CPT

## 2024-11-13 PROCEDURE — 96375 TX/PRO/DX INJ NEW DRUG ADDON: CPT

## 2024-11-13 PROCEDURE — 96361 HYDRATE IV INFUSION ADD-ON: CPT

## 2024-11-13 RX ORDER — ONDANSETRON 2 MG/ML
4 INJECTION INTRAMUSCULAR; INTRAVENOUS ONCE
Status: COMPLETED | OUTPATIENT
Start: 2024-11-13 | End: 2024-11-13

## 2024-11-13 RX ORDER — PANTOPRAZOLE SODIUM 40 MG/10ML
40 INJECTION, POWDER, LYOPHILIZED, FOR SOLUTION INTRAVENOUS ONCE
Status: COMPLETED | OUTPATIENT
Start: 2024-11-13 | End: 2024-11-13

## 2024-11-13 RX ORDER — KETOROLAC TROMETHAMINE 30 MG/ML
15 INJECTION, SOLUTION INTRAMUSCULAR; INTRAVENOUS ONCE
Status: COMPLETED | OUTPATIENT
Start: 2024-11-13 | End: 2024-11-13

## 2024-11-13 RX ORDER — ONDANSETRON 4 MG/1
4 TABLET, FILM COATED ORAL EVERY 6 HOURS
Qty: 20 TABLET | Refills: 0 | Status: SHIPPED | OUTPATIENT
Start: 2024-11-13

## 2024-11-13 RX ORDER — FAMOTIDINE 20 MG/1
20 TABLET, FILM COATED ORAL 2 TIMES DAILY
Qty: 30 TABLET | Refills: 0 | Status: SHIPPED | OUTPATIENT
Start: 2024-11-13

## 2024-11-13 RX ADMIN — ONDANSETRON 4 MG: 2 INJECTION INTRAMUSCULAR; INTRAVENOUS at 21:48

## 2024-11-13 RX ADMIN — IOHEXOL 100 ML: 350 INJECTION, SOLUTION INTRAVENOUS at 18:47

## 2024-11-13 RX ADMIN — PANTOPRAZOLE SODIUM 40 MG: 40 INJECTION, POWDER, FOR SOLUTION INTRAVENOUS at 21:48

## 2024-11-13 RX ADMIN — KETOROLAC TROMETHAMINE 15 MG: 30 INJECTION, SOLUTION INTRAMUSCULAR at 17:48

## 2024-11-13 RX ADMIN — SODIUM CHLORIDE 1000 ML: 0.9 INJECTION, SOLUTION INTRAVENOUS at 17:45

## 2024-11-13 RX ADMIN — ONDANSETRON 4 MG: 2 INJECTION INTRAMUSCULAR; INTRAVENOUS at 17:47

## 2024-11-14 ENCOUNTER — OFFICE VISIT (OUTPATIENT)
Dept: FAMILY MEDICINE CLINIC | Facility: CLINIC | Age: 54
End: 2024-11-14
Payer: COMMERCIAL

## 2024-11-14 VITALS
BODY MASS INDEX: 27.82 KG/M2 | HEART RATE: 98 BPM | HEIGHT: 65 IN | OXYGEN SATURATION: 98 % | WEIGHT: 167 LBS | TEMPERATURE: 98.9 F | SYSTOLIC BLOOD PRESSURE: 104 MMHG | DIASTOLIC BLOOD PRESSURE: 66 MMHG

## 2024-11-14 DIAGNOSIS — Z00.00 ROUTINE PHYSICAL EXAMINATION: ICD-10-CM

## 2024-11-14 DIAGNOSIS — F41.9 ANXIETY AND DEPRESSION: Primary | ICD-10-CM

## 2024-11-14 DIAGNOSIS — R10.84 GENERALIZED ABDOMINAL DISCOMFORT: ICD-10-CM

## 2024-11-14 DIAGNOSIS — Z13.1 SCREENING FOR DIABETES MELLITUS: ICD-10-CM

## 2024-11-14 DIAGNOSIS — Z12.11 COLON CANCER SCREENING: ICD-10-CM

## 2024-11-14 DIAGNOSIS — E55.9 VITAMIN D DEFICIENCY: ICD-10-CM

## 2024-11-14 DIAGNOSIS — E21.3 HYPERPARATHYROIDISM (HCC): ICD-10-CM

## 2024-11-14 DIAGNOSIS — F32.A ANXIETY AND DEPRESSION: Primary | ICD-10-CM

## 2024-11-14 DIAGNOSIS — E78.5 DYSLIPIDEMIA: ICD-10-CM

## 2024-11-14 PROCEDURE — 99396 PREV VISIT EST AGE 40-64: CPT

## 2024-11-14 PROCEDURE — 99204 OFFICE O/P NEW MOD 45 MIN: CPT

## 2024-11-14 RX ORDER — ESCITALOPRAM OXALATE 5 MG/1
5 TABLET ORAL DAILY
Qty: 30 TABLET | Refills: 0 | Status: SHIPPED | OUTPATIENT
Start: 2024-11-14

## 2024-11-14 RX ORDER — BUPROPION HYDROCHLORIDE 150 MG/1
150 TABLET ORAL EVERY MORNING
Qty: 30 TABLET | Refills: 0 | Status: SHIPPED | OUTPATIENT
Start: 2024-11-14 | End: 2024-12-14

## 2024-11-14 NOTE — ED PROVIDER NOTES
Time reflects when diagnosis was documented in both MDM as applicable and the Disposition within this note       Time User Action Codes Description Comment    2024  9:10 PM Sally Palumbo Add [R10.84] Generalized abdominal pain     2024  9:10 PM Sally Palumbo Add [R11.2] Nausea and vomiting           ED Disposition       ED Disposition   Discharge    Condition   Stable    Date/Time     9:10 PM    Comment   Nancy Luna discharge to home/self care.                   Assessment & Plan       Medical Decision Making  Abdominal pain, concern for kidney stone, nausea and vomiting, other intra-abdominal pathology.  CT abdomen pelvis, lab work performed, symptomatic treatment.    No kidney stone identified.  There is no cause for the abdominal pain identified on her CAT scan. May be element of gerd, viral gastirits, or nonspecified abd pain.     Salvatore ralph symptoamtic tx for outpatient follow up.    Amount and/or Complexity of Data Reviewed  Labs: ordered.  Radiology: ordered.    Risk  Prescription drug management.        ED Course as of 24 Patient ambulates steadily to the bathroom      No acute intra-abdominal pathology to explain abdominal pain.       Medications       ED Risk Strat Scores                                               History of Present Illness       Chief Complaint   Patient presents with    Abdominal Pain     Patient reports generalized abdominal pain and right flank pain.  Patient appears jaundice.       Past Medical History:   Diagnosis Date    Depression Unsure    Feel down alot, feel lonely    Hyperlipidemia     Kidney stone Unsure    Personal history of COVID-19 2021    Status post parathyroidectomy 2023      Past Surgical History:   Procedure Laterality Date     SECTION      OOPHORECTOMY Right     age 47    NM PARATHYROIDECTOMY/EXPLORATION PARATHYROIDS Left 2023    Procedure: MINIMALLY  INVASIVE LEFT PARATHYROIDECTOMY,;  Surgeon: Ranjan Young MD;  Location: [facility];  Service: Surgical Oncology    US GUIDED THYROID BIOPSY  10/13/2022      Family History   Problem Relation Age of Onset    Cancer Mother 65        Unknown type    Diabetes type II Father     Diabetes Father     Clotting disorder Father     Lung cancer Father 62        mesothylioma    No Known Problems Sister     No Known Problems Sister     No Known Problems Daughter     No Known Problems Brother       Social History     Tobacco Use    Smoking status: Never     Passive exposure: Never    Smokeless tobacco: Never   Vaping Use    Vaping status: Never Used   Substance Use Topics    Alcohol use: Never    Drug use: Never      E-Cigarette/Vaping    E-Cigarette Use Never User       E-Cigarette/Vaping Substances      I have reviewed and agree with the history as documented.     53-year-old female presents with right flank pain radiating to  R groin.   No chest pain, no shortness of breath, she is nauseous she believes from the pain.  Symptoms sound consistent with kidney stone however no blood in urine, no dysuria.      Abdominal Pain  Associated symptoms: nausea and vomiting    Associated symptoms: no chest pain, no chills, no constipation, no diarrhea, no dysuria, no fever and no shortness of breath        Review of Systems   Constitutional:  Negative for chills and fever.   Respiratory:  Negative for chest tightness and shortness of breath.    Cardiovascular:  Negative for chest pain and leg swelling.   Gastrointestinal:  Positive for abdominal pain, nausea and vomiting. Negative for constipation and diarrhea.   Genitourinary:  Positive for flank pain. Negative for dysuria.   Musculoskeletal:  Negative for back pain and neck pain.   Skin:  Negative for wound.   Neurological:  Negative for dizziness and headaches.           Objective       ED Triage Vitals   Temperature Pulse Blood Pressure Respirations SpO2 Patient Position -  Orthostatic VS   11/13/24 1623 11/13/24 1623 11/13/24 1623 11/13/24 1623 11/13/24 1623 11/13/24 1623   98.5 °F (36.9 °C) 98 113/62 18 100 % Sitting      Temp Source Heart Rate Source BP Location FiO2 (%) Pain Score    11/13/24 1623 11/13/24 1623 11/13/24 1623 -- 11/13/24 1748    Tympanic Monitor Left arm  9      Vitals      Date and Time Temp Pulse SpO2 Resp BP Pain Score FACES Pain Rating User   11/13/24 2012 -- 105 98 % 20 112/63 -- -- SH   11/13/24 1748 -- -- -- -- -- 9 -- FB   11/13/24 1623 98.5 °F (36.9 °C) 98 100 % 18 113/62 -- -- LA            Physical Exam  Vitals reviewed.   Constitutional:       General: She is not in acute distress.     Appearance: She is well-developed. She is not diaphoretic.   HENT:      Head: Normocephalic and atraumatic.   Eyes:      General: No scleral icterus.        Right eye: No discharge.         Left eye: No discharge.      Conjunctiva/sclera: Conjunctivae normal.      Pupils: Pupils are equal, round, and reactive to light.   Neck:      Vascular: No JVD.   Cardiovascular:      Rate and Rhythm: Normal rate and regular rhythm.      Heart sounds: Normal heart sounds. No murmur heard.     No friction rub. No gallop.   Pulmonary:      Effort: Pulmonary effort is normal. No respiratory distress.      Breath sounds: Normal breath sounds. No wheezing or rales.   Chest:      Chest wall: No tenderness.   Abdominal:      General: Bowel sounds are normal. There is no distension.      Palpations: Abdomen is soft.      Tenderness: There is generalized abdominal tenderness and tenderness in the right lower quadrant. There is right CVA tenderness. There is no guarding or rebound.      Hernia: No hernia is present.   Musculoskeletal:         General: No tenderness or deformity. Normal range of motion.      Cervical back: Normal range of motion and neck supple.   Skin:     General: Skin is warm and dry.      Coloration: Skin is not pale.      Findings: No erythema or rash.   Neurological:       Mental Status: She is alert and oriented to person, place, and time.      Cranial Nerves: No cranial nerve deficit.   Psychiatric:         Behavior: Behavior normal.         Results Reviewed       Procedure Component Value Units Date/Time    Comprehensive metabolic panel [[id number]] Collected: 11/13/24 1738    Lab Status: Final result Specimen: Blood from Arm, Left Updated: 11/13/24 1809     Sodium 137 mmol/L      Potassium 3.7 mmol/L      Chloride 103 mmol/L      CO2 27 mmol/L      ANION GAP 7 mmol/L      BUN 19 mg/dL      Creatinine 0.70 mg/dL      Glucose 131 mg/dL      Calcium 9.0 mg/dL      AST 19 U/L      ALT 18 U/L      Alkaline Phosphatase 77 U/L      Total Protein 7.5 g/dL      Albumin 4.4 g/dL      Total Bilirubin 0.48 mg/dL      eGFR 100 ml/min/1.73sq m     Narrative:      [employer] guidelines for Chronic Kidney Disease (CKD):     Stage 1 with normal or high GFR (GFR > 90 mL/min/1.73 square meters)    Stage 2 Mild CKD (GFR = 60-89 mL/min/1.73 square meters)    Stage 3A Moderate CKD (GFR = 45-59 mL/min/1.73 square meters)    Stage 3B Moderate CKD (GFR = 30-44 mL/min/1.73 square meters)    Stage 4 Severe CKD (GFR = 15-29 mL/min/1.73 square meters)    Stage 5 End Stage CKD (GFR <15 mL/min/1.73 square meters)  Note: GFR calculation is accurate only with a steady state creatinine    Lipase [[id number]]  (Normal) Collected: 11/13/24 1738    Lab Status: Final result Specimen: Blood from Arm, Left Updated: 11/13/24 1809     Lipase 26 u/L     Smear Review(Phlebs Do Not Order) [[id number]] Collected: 11/13/24 1738    Lab Status: Final result Specimen: Blood from Arm, Left Updated: 11/13/24 1800     RBC Morphology Normal     Platelet Estimate Adequate    CBC and differential [[id number]]  (Abnormal) Collected: 11/13/24 1738    Lab Status: Final result Specimen: Blood from Arm, Left Updated: 11/13/24 1800     WBC 10.42 Thousand/uL      RBC 4.40 Million/uL      Hemoglobin 13.1 g/dL       Hematocrit 40.7 %      MCV 93 fL      MCH 29.8 pg      MCHC 32.2 g/dL      RDW 12.8 %      MPV 10.2 fL      Platelets 234 Thousands/uL      nRBC 0 /100 WBCs      Segmented % 93 %      Immature Grans % 0 %      Lymphocytes % 3 %      Monocytes % 3 %      Eosinophils Relative 1 %      Basophils Relative 0 %      Absolute Neutrophils 9.70 Thousands/µL      Absolute Immature Grans 0.04 Thousand/uL      Absolute Lymphocytes 0.26 Thousands/µL      Absolute Monocytes 0.29 Thousand/µL      Eosinophils Absolute 0.11 Thousand/µL      Basophils Absolute 0.02 Thousands/µL     Narrative:      This is an appended report.  These results have been appended to a previously verified report.    Urine Microscopic [[id number]]  (Abnormal) Collected: 11/13/24 1734    Lab Status: Final result Specimen: Urine, Clean Catch Updated: 11/13/24 1744     RBC, UA 1-2 /hpf      WBC, UA 1-2 /hpf      Epithelial Cells Occasional /hpf      Bacteria, UA None Seen /hpf      MUCUS THREADS Moderate    UA w Reflex to Microscopic w Reflex to Culture [[id number]]  (Abnormal) Collected: 11/13/24 1734    Lab Status: Final result Specimen: Urine, Clean Catch Updated: 11/13/24 1742     Color, UA Light Yellow     Clarity, UA Clear     Specific Gravity, UA 1.031     pH, UA 5.0     Leukocytes, UA Negative     Nitrite, UA Negative     Protein, UA Trace mg/dl      Glucose, UA Trace mg/dl      Ketones, UA Negative mg/dl      Urobilinogen, UA <2.0 mg/dl      Bilirubin, UA Negative     Occult Blood, UA Trace            CT abdomen pelvis with contrast   Final Interpretation by Christiano Alatorre MD (11/13 1926)      No identifiable acute abnormality to account for the patient's clinical presentation.         Workstation performed: [id number]             Procedures    ED Medication and Procedure Management   Prior to Admission Medications   Prescriptions Last Dose Informant Patient Reported? Taking?   Ascorbic Acid (VITAMIN C PO)  Self Yes No   Sig: Take by mouth    Sodium Sulfate-Mag Sulfate-KCl 7087-591-647 MG TABS  Self No No   Sig: Take 24 tablets by mouth see administration instructions See instructions from the office.   Patient not taking: Reported on 1/26/2024   VITAMIN D PO  Self Yes No   Sig: Take by mouth in the morning   b complex vitamins capsule  Self Yes No   Sig: Take 1 capsule by mouth daily   escitalopram (LEXAPRO) 10 mg tablet  Self No No   Sig: Take 1 tablet (10 mg total) by mouth daily      Facility-Administered Medications: None     Patient's Medications   Discharge Prescriptions    FAMOTIDINE (PEPCID) 20 MG TABLET    Take 1 tablet (20 mg total) by mouth 2 (two) times a day       Start Date: 11/13/2024End Date: --       Order Dose: 20 mg       Quantity: 30 tablet    Refills: 0    ONDANSETRON (ZOFRAN) 4 MG TABLET    Take 1 tablet (4 mg total) by mouth every 6 (six) hours As needed for nausea/vomiting       Start Date: 11/13/2024End Date: --       Order Dose: 4 mg       Quantity: 20 tablet    Refills: 0     No discharge procedures on file.  ED SEPSIS DOCUMENTATION   Time reflects when diagnosis was documented in both MDM as applicable and the Disposition within this note       Time User Action Codes Description Comment    11/13/2024  9:10 PM Sally Palumbo Add [R10.84] Generalized abdominal pain     11/13/2024  9:10 PM Sally Palumbo Add [R11.2] Nausea and vomiting                  Sally Palumbo DO  11/13/24 4558

## 2024-11-14 NOTE — ASSESSMENT & PLAN NOTE
- check lipid panel   - recommended low fat/low sugar diet, increase fruits and vegetables and increase daily exercise    Orders:    Lipid panel; Future

## 2024-11-14 NOTE — PROGRESS NOTES
Adult Annual Physical  Name: Nancy Luna      : 1970      MRN: [id number]  Encounter Provider: Betty Alcaraz PA-C  Encounter Date: 2024   Encounter department: [facility]    Assessment & Plan  Anxiety and depression  Depression Screening Follow-up Plan: Patient's depression screening was positive with a PHQ-9 score of 11. Patient with underlying depression and was advised to continue current medications as prescribed. Patient assessed for underlying major depression. They have no active suicidal ideations. Brief counseling provided and recommend additional follow-up/re-evaluation next office visit.    - per patient request, will begin tapering off lexapro -- decrease to 5 mg QD x 2 weeks, then 5 mg every other day x 2 weeks, then stop   - will start wellbutrin 150 mg QD for depression symptoms; discussed side effects   - patient has no current thoughts of harming herself, did advise ER if she ever di which she was agreeable with   - we will follow up for medication check in four weeks, to call sooner with any questions or concerns    Orders:    escitalopram (LEXAPRO) 5 mg tablet; Take 1 tablet (5 mg total) by mouth daily    buPROPion (WELLBUTRIN XL) 150 mg 24 hr tablet; Take 1 tablet (150 mg total) by mouth every morning    Generalized abdominal discomfort  - seen in ED yesterday, reviewed visit -- CT abdomen/pelvis unremarkable; CMP showed normal kidney and liver function and UA unremarkable   - today, generalized nonspecific discomfort to palpation of abdomen; normal active bowel sounds and otherwise unremarkable   - advised to take Zofran PRN for nausea as prescribed by ER  - did discuss trial of bentyl; patient not interested in this time  - recommended BRAT diet and increase hydration   - advised ER if symptoms worsen; otherwise to call if symptoms persist and will refer to GI        Hyperparathyroidism (HCC)  - patient reports she is post parathyroidectomy; no longer  following with endocrinology   - we will check PTH level, TSH, and vitamin D    Orders:    PTH, intact; Future    TSH, 3rd generation with Free T4 reflex; Future    Dyslipidemia  - check lipid panel   - recommended low fat/low sugar diet, increase fruits and vegetables and increase daily exercise    Orders:    Lipid panel; Future    Vitamin D deficiency  - check vitamin D level    Orders:    Vitamin D 25 hydroxy; Future    Routine physical examination  - physical exam unremarkable  - ordered routine labs  - ordered cologuard for colon cancer screening  - follows with GYN, up to date on cervical cancer screening and has mammogram scheduled 11/29/24  - declines HIV/Hep C screening today        Colon cancer screening    Orders:    Cologuard    Screening for diabetes mellitus    Orders:    Hemoglobin A1C; Future    Immunizations and preventive care screenings were discussed with patient today. Appropriate education was printed on patient's after visit summary.    Counseling:  Dental Health: discussed importance of regular tooth brushing, flossing, and dental visits.  Exercise: the importance of regular exercise/physical activity was discussed. Recommend exercise 3-5 times per week for at least 30 minutes.      History of Present Illness       Patient presents to establish care and for routine physical.   She'd like to talk about her depression -- she is currently on lexapro 10 mg QD however reports she does not want to be on it any longer as she feels she is addicted to it; she would like to discuss other options for her depression. She denies any thoughts of harming herself or others.     Patient was also seen in the ED yesterday for abdominal pain -- CT abdomen was completed which was unremarkable. Labs showed normal kidney and liver function and normal lipase. She was discharged with zofran as she had one episode of vomiting yesterday.   She reports feeling a little better today -- no diarrhea or vomiting today just  feels a bit nauseous. She has not picked up the zofran yet but will after visit.   She denies any fevers or chills. No URI symptoms. No one with similar symptoms.     Adult Annual Physical:  Patient presents for annual physical.     Diet and Physical Activity:  - Diet/Nutrition: well balanced diet.  - Exercise: no formal exercise.    Depression Screening:    - PHQ-9 Score: 11    General Health:  - Sleep: sleeps well.  - Hearing: normal hearing bilateral ears.  - Vision: wears glasses and most recent eye exam > 1 year ago.  - Dental: regular dental visits and brushes teeth twice daily.    /GYN Health:  - Follows with GYN: yes.   - Menopause: postmenopausal.   - Contraception: menopause.      Review of Systems   Constitutional:  Negative for chills, diaphoresis and fever.   Respiratory:  Negative for chest tightness, shortness of breath and wheezing.    Cardiovascular:  Negative for chest pain and palpitations.   Gastrointestinal:  Positive for abdominal pain, diarrhea and nausea. Negative for constipation.   Genitourinary:  Negative for difficulty urinating, dysuria, flank pain and hematuria.   Neurological:  Negative for dizziness, light-headedness and headaches.   Psychiatric/Behavioral:  Negative for self-injury, sleep disturbance and suicidal ideas. The patient is not nervous/anxious.      Medical History Reviewed by provider this encounter:  Tobacco  Allergies  Meds  Problems  Med Hx  Surg Hx  Fam Hx     .  Past Medical History   Past Medical History:   Diagnosis Date    Depression Unsure    Feel down alot, feel lonely    Hyperlipidemia     Kidney stone Unsure    Personal history of COVID-19 2021    Status post parathyroidectomy 2023     Past Surgical History:   Procedure Laterality Date     SECTION      OOPHORECTOMY Right     age 47    TX PARATHYROIDECTOMY/EXPLORATION PARATHYROIDS Left 2023    Procedure: MINIMALLY INVASIVE LEFT PARATHYROIDECTOMY,;  Surgeon: Ranjan Young MD;   Location: [facility];  Service: Surgical Oncology    US GUIDED THYROID BIOPSY  10/13/2022     Family History   Problem Relation Age of Onset    Cancer Mother 65        Unknown type    Diabetes type II Father     Diabetes Father     Clotting disorder Father     Lung cancer Father 62        mesothylioma    No Known Problems Sister     No Known Problems Sister     No Known Problems Daughter     No Known Problems Brother       reports that she has never smoked. She has never been exposed to tobacco smoke. She has never used smokeless tobacco. She reports that she does not drink alcohol and does not use drugs.  Current Outpatient Medications on File Prior to Visit   Medication Sig Dispense Refill    Ascorbic Acid (VITAMIN C PO) Take by mouth      b complex vitamins capsule Take 1 capsule by mouth daily      famotidine (PEPCID) 20 mg tablet Take 1 tablet (20 mg total) by mouth 2 (two) times a day 30 tablet 0    ondansetron (ZOFRAN) 4 mg tablet Take 1 tablet (4 mg total) by mouth every 6 (six) hours As needed for nausea/vomiting 20 tablet 0    Sodium Sulfate-Mag Sulfate-KCl 1746-310-115 MG TABS Take 24 tablets by mouth see administration instructions See instructions from the office. (Patient not taking: Reported on 1/26/2024) 24 tablet 0    VITAMIN D PO Take by mouth in the morning      [DISCONTINUED] escitalopram (LEXAPRO) 10 mg tablet Take 1 tablet (10 mg total) by mouth daily 90 tablet 1     Current Facility-Administered Medications on File Prior to Visit   Medication Dose Route Frequency Provider Last Rate Last Admin    [COMPLETED] iohexol (OMNIPAQUE) 350 MG/ML injection (MULTI-DOSE) 100 mL  100 mL Intravenous Once in imaging Sally L Coppersmith, DO   100 mL at 11/13/24 1847    [COMPLETED] ketorolac (TORADOL) injection 15 mg  15 mg Intravenous Once Sally L Coppersmith, DO   15 mg at 11/13/24 1748    [COMPLETED] ondansetron (ZOFRAN) injection 4 mg  4 mg Intravenous Once Sally L Coppersmith, DO   4 mg at  11/13/24 1747    [COMPLETED] ondansetron (ZOFRAN) injection 4 mg  4 mg Intravenous Once Sally L Coppersmith, DO   4 mg at 11/13/24 2148    [COMPLETED] pantoprazole (PROTONIX) injection 40 mg  40 mg Intravenous Once Sally L Coppersmith, DO   40 mg at 11/13/24 2148    [COMPLETED] sodium chloride 0.9 % bolus 1,000 mL  1,000 mL Intravenous Once Sally L Coppersmith, DO   Stopped at 11/13/24 2013   No Known Allergies   Current Outpatient Medications on File Prior to Visit   Medication Sig Dispense Refill    Ascorbic Acid (VITAMIN C PO) Take by mouth      b complex vitamins capsule Take 1 capsule by mouth daily      famotidine (PEPCID) 20 mg tablet Take 1 tablet (20 mg total) by mouth 2 (two) times a day 30 tablet 0    ondansetron (ZOFRAN) 4 mg tablet Take 1 tablet (4 mg total) by mouth every 6 (six) hours As needed for nausea/vomiting 20 tablet 0    Sodium Sulfate-Mag Sulfate-KCl 4738-504-129 MG TABS Take 24 tablets by mouth see administration instructions See instructions from the office. (Patient not taking: Reported on 1/26/2024) 24 tablet 0    VITAMIN D PO Take by mouth in the morning      [DISCONTINUED] escitalopram (LEXAPRO) 10 mg tablet Take 1 tablet (10 mg total) by mouth daily 90 tablet 1     Current Facility-Administered Medications on File Prior to Visit   Medication Dose Route Frequency Provider Last Rate Last Admin    [COMPLETED] iohexol (OMNIPAQUE) 350 MG/ML injection (MULTI-DOSE) 100 mL  100 mL Intravenous Once in imaging Sally L Coppersmith, DO   100 mL at 11/13/24 1847    [COMPLETED] ketorolac (TORADOL) injection 15 mg  15 mg Intravenous Once Sally L Coppersmith, DO   15 mg at 11/13/24 1748    [COMPLETED] ondansetron (ZOFRAN) injection 4 mg  4 mg Intravenous Once Sally L Coppersmith, DO   4 mg at 11/13/24 1747    [COMPLETED] ondansetron (ZOFRAN) injection 4 mg  4 mg Intravenous Once Sally L Coppersmith, DO   4 mg at 11/13/24 2148    [COMPLETED] pantoprazole (PROTONIX) injection 40 mg   "40 mg Intravenous Once Sally L Coppersmith, DO   40 mg at 11/13/24 2148    [COMPLETED] sodium chloride 0.9 % bolus 1,000 mL  1,000 mL Intravenous Once Sally L Coppersmith, DO   Stopped at 11/13/24 2013      Social History     Tobacco Use    Smoking status: Never     Passive exposure: Never    Smokeless tobacco: Never   Vaping Use    Vaping status: Never Used   Substance and Sexual Activity    Alcohol use: Never    Drug use: Never    Sexual activity: Yes     Partners: Male       Objective   /66   Pulse 98   Temp 98.9 °F (37.2 °C)   Ht 5' 5\" (1.651 m)   Wt 75.8 kg (167 lb)   LMP 08/01/2023 (Exact Date)   SpO2 98%   BMI 27.79 kg/m²     Physical Exam  Vitals reviewed.   Constitutional:       General: She is not in acute distress.     Appearance: Normal appearance. She is not ill-appearing or diaphoretic.   HENT:      Head: Normocephalic and atraumatic.      Right Ear: Tympanic membrane, ear canal and external ear normal. There is no impacted cerumen.      Left Ear: Tympanic membrane, ear canal and external ear normal. There is no impacted cerumen.      Nose: Nose normal. No congestion or rhinorrhea.      Mouth/Throat:      Mouth: Mucous membranes are moist.      Pharynx: Oropharynx is clear. No oropharyngeal exudate or posterior oropharyngeal erythema.   Eyes:      General:         Right eye: No discharge.         Left eye: No discharge.      Conjunctiva/sclera: Conjunctivae normal.   Cardiovascular:      Rate and Rhythm: Normal rate and regular rhythm.      Pulses: Normal pulses.      Heart sounds: Normal heart sounds. No murmur heard.  Pulmonary:      Effort: Pulmonary effort is normal. No respiratory distress.      Breath sounds: Normal breath sounds. No wheezing, rhonchi or rales.   Abdominal:      General: Bowel sounds are normal. There is no distension.      Palpations: Abdomen is soft. There is no mass.      Tenderness: There is abdominal tenderness (nonspecific generalized discomfort). There " is no guarding or rebound.      Hernia: No hernia is present.   Musculoskeletal:         General: Normal range of motion.      Cervical back: Normal range of motion and neck supple.      Right lower leg: No edema.      Left lower leg: No edema.   Lymphadenopathy:      Cervical: No cervical adenopathy.   Skin:     General: Skin is warm.   Neurological:      General: No focal deficit present.      Mental Status: She is alert.      Gait: Gait normal.   Psychiatric:         Mood and Affect: Mood normal.         Behavior: Behavior normal.         Thought Content: Thought content normal.

## 2024-11-14 NOTE — ASSESSMENT & PLAN NOTE
Depression Screening Follow-up Plan: Patient's depression screening was positive with a PHQ-9 score of 11. Patient with underlying depression and was advised to continue current medications as prescribed. Patient assessed for underlying major depression. They have no active suicidal ideations. Brief counseling provided and recommend additional follow-up/re-evaluation next office visit.    - per patient request, will begin tapering off lexapro -- decrease to 5 mg QD x 2 weeks, then 5 mg every other day x 2 weeks, then stop   - will start wellbutrin 150 mg QD for depression symptoms; discussed side effects   - patient has no current thoughts of harming herself, did advise ER if she ever di which she was agreeable with   - we will follow up for medication check in four weeks, to call sooner with any questions or concerns    Orders:    escitalopram (LEXAPRO) 5 mg tablet; Take 1 tablet (5 mg total) by mouth daily    buPROPion (WELLBUTRIN XL) 150 mg 24 hr tablet; Take 1 tablet (150 mg total) by mouth every morning

## 2024-11-14 NOTE — ASSESSMENT & PLAN NOTE
- patient reports she is post parathyroidectomy; no longer following with endocrinology   - we will check PTH level, TSH, and vitamin D    Orders:    PTH, intact; Future    TSH, 3rd generation with Free T4 reflex; Future

## 2024-11-15 ENCOUNTER — APPOINTMENT (OUTPATIENT)
Dept: LAB | Facility: CLINIC | Age: 54
End: 2024-11-15
Payer: COMMERCIAL

## 2024-11-15 DIAGNOSIS — E55.9 VITAMIN D DEFICIENCY: ICD-10-CM

## 2024-11-15 DIAGNOSIS — Z13.1 SCREENING FOR DIABETES MELLITUS: ICD-10-CM

## 2024-11-15 DIAGNOSIS — E21.3 HYPERPARATHYROIDISM (HCC): ICD-10-CM

## 2024-11-15 DIAGNOSIS — E78.5 DYSLIPIDEMIA: ICD-10-CM

## 2024-11-15 LAB
25(OH)D3 SERPL-MCNC: 19.8 NG/ML (ref 30–100)
CHOLEST SERPL-MCNC: 184 MG/DL (ref ?–200)
EST. AVERAGE GLUCOSE BLD GHB EST-MCNC: 108 MG/DL
HBA1C MFR BLD: 5.4 %
HDLC SERPL-MCNC: 62 MG/DL
LDLC SERPL CALC-MCNC: 108 MG/DL (ref 0–100)
NONHDLC SERPL-MCNC: 122 MG/DL
PTH-INTACT SERPL-MCNC: 45.2 PG/ML (ref 12–88)
TRIGL SERPL-MCNC: 69 MG/DL (ref ?–150)
TSH SERPL DL<=0.05 MIU/L-ACNC: 1.92 UIU/ML (ref 0.45–4.5)

## 2024-11-15 PROCEDURE — 36415 COLL VENOUS BLD VENIPUNCTURE: CPT

## 2024-11-15 PROCEDURE — 84443 ASSAY THYROID STIM HORMONE: CPT

## 2024-11-15 PROCEDURE — 83036 HEMOGLOBIN GLYCOSYLATED A1C: CPT

## 2024-11-15 PROCEDURE — 82306 VITAMIN D 25 HYDROXY: CPT

## 2024-11-15 PROCEDURE — 80061 LIPID PANEL: CPT

## 2024-11-15 PROCEDURE — 83970 ASSAY OF PARATHORMONE: CPT

## 2024-11-17 ENCOUNTER — RESULTS FOLLOW-UP (OUTPATIENT)
Dept: FAMILY MEDICINE CLINIC | Facility: CLINIC | Age: 54
End: 2024-11-17

## 2024-11-18 DIAGNOSIS — E55.9 VITAMIN D DEFICIENCY: Primary | ICD-10-CM

## 2024-11-18 PROBLEM — E83.52 HYPERCALCEMIA: Status: RESOLVED | Noted: 2022-09-08 | Resolved: 2024-11-18

## 2024-11-18 RX ORDER — ERGOCALCIFEROL 1.25 MG/1
50000 CAPSULE ORAL WEEKLY
Qty: 12 CAPSULE | Refills: 0 | Status: SHIPPED | OUTPATIENT
Start: 2024-11-18

## 2024-11-18 NOTE — TELEPHONE ENCOUNTER
Relayed results to Nancy as per provider message.   Her vitamin D is low -- I would recommend starting a high dose supplement, 50,000 units weekly x 12 weeks and then rechecking levels. If back to normal can transition to daily lower dose. If she is okay with this let me know and I can send it in. Her total cholesterol and triglycerides are normal. LDL bad cholesterol a bit high; recommend low fat/low sugar diet, increase fruits and vegetables and increase daily exercise. Her TSH and PTH are normal A1C is normal, no concern for diabetes.                                                           She was ok with calling in the vitamin D to her pharmacy of [facility] and following up with lab work.  Patient expressed confirmation and understanding of providers orders.

## 2024-11-26 ENCOUNTER — TELEPHONE (OUTPATIENT)
Age: 54
End: 2024-11-26

## 2024-11-26 NOTE — TELEPHONE ENCOUNTER
Pt called to let PCP know that she is unable to send in Cologaurd sample. She states her stool has not been solid enough. She is going to let office know when she is able to send sample.

## 2024-12-03 ENCOUNTER — PROCEDURE VISIT (OUTPATIENT)
Age: 54
End: 2024-12-03
Payer: COMMERCIAL

## 2024-12-03 VITALS
WEIGHT: 167 LBS | HEART RATE: 78 BPM | SYSTOLIC BLOOD PRESSURE: 109 MMHG | DIASTOLIC BLOOD PRESSURE: 77 MMHG | HEIGHT: 65 IN | BODY MASS INDEX: 27.82 KG/M2

## 2024-12-03 DIAGNOSIS — M99.01 SEGMENTAL DYSFUNCTION OF CERVICAL REGION: ICD-10-CM

## 2024-12-03 DIAGNOSIS — M54.59 MECHANICAL LOW BACK PAIN: ICD-10-CM

## 2024-12-03 DIAGNOSIS — M99.04 SEGMENTAL DYSFUNCTION OF SACRAL REGION: ICD-10-CM

## 2024-12-03 DIAGNOSIS — M50.30 DDD (DEGENERATIVE DISC DISEASE), CERVICAL: ICD-10-CM

## 2024-12-03 DIAGNOSIS — M99.02 SEGMENTAL DYSFUNCTION OF THORACIC REGION: Primary | ICD-10-CM

## 2024-12-03 DIAGNOSIS — M99.03 SEGMENTAL DYSFUNCTION OF LUMBAR REGION: ICD-10-CM

## 2024-12-03 PROCEDURE — 98942 CHIROPRACTIC MANJ 5 REGIONS: CPT | Performed by: CHIROPRACTOR

## 2024-12-03 NOTE — PROGRESS NOTES
Diagnoses and all orders for this visit:    Segmental dysfunction of thoracic region    Segmental dysfunction of lumbar region    Segmental dysfunction of cervical region    Segmental dysfunction of sacral region    DDD (degenerative disc disease), cervical    Mechanical low back pain      ASSESSMENT:  Pt's symptoms and exam findings consistent with degenerative disc disease in cervical spine and  lower back pain secondary to repetitive st/sp injury, exacerbated by postural/ergonomic stressors. Pt responded well to stretches and manual mobilization of the affected spinal and myofascial tissues with increased ROM; trial of conservative tx recommended consisting of stretching, ther-ex, graded mobilization/manipulation of the affected spinal/myofascial tissues, postural/ergonomic education and take home stretches/exercises. If symptoms fail to improve with short trial of conservative care, appropriate imaging and referral will be coordinated.  - The patient is feeling improvements in pain post-trx    PROCEDURE CODES: 04703    TREATMENT:  Fear avoidance behavior discussion, encouraged and reassured pt that natural course of condition is to improve over time with adherence to tx plan and home care strategies. Home care recommendations: avoid bed rest, walk (but avoid trails and uneven surfaces), gradual return to activity to tolerance (avoid anything that peripheralizes symptoms), ice 20 min on/off, watch for ice burn, call if symptoms peripheralize, worsen, or neurologic deficit progresses. Ther-ex: IASTM - discussed post procedure soreness and/or ecchymosis for up to 36 hrs, applied to affected mm hypertonicities; wall afshan, axial retraction, upper trap stretch, lev scap stretch, SCM stretch, lat rows with t-band, lat pull down with t-band, abdominal bracing; greater than 15 min spent performing above mentioned ther-ex. Thoracic mobilization/manipulation: prone P-A mob,; cervical mobilization/manipulation:  diversified supine graded mobilization, cervical traction, prone C/T jct HVLA, Lumbar mobilization/ Manipulation- Flexion -distraction, Shirley Drop HVLA    HPI:  Nancy Luna is a 54 y.o. female   Chief Complaint   Patient presents with    Back Pain     Mid to lower lumbar pain score 5    Patient states sore and achy        The patient presents to the office with upper back and lower back pain. The patient has chronic pain in the upper back that has been aggravated by heavy lifting. Constant pain- aching. No radiation into upper back. Neck surgery May 2023 - parathyroid and had weakness with washing hair. Self care is stretching. Exercise routine- no routine, 11 and 9 y/o grand-kids. House stuff during her days. Scrubbing and reaching bothers it more. Walk frequency was better 3 months. 3/10 in the upper back and up to a 5-6/10.  The patient lower back-sitting-more than right away. No treatment. 3/14/23-CT scan on 6-7 with moderate narrowing on R and mild in L foramina  12/3- The patient reports mid to lower back pain after lifting heavy objects.     Back Pain    Past Medical History:   Diagnosis Date    Depression Unsure    Feel down alot, feel lonely    Hyperlipidemia     Kidney stone Unsure    Personal history of COVID-19 12/2021    Status post parathyroidectomy 05/11/2023      The following portions of the patient's history were reviewed and updated as appropriate: allergies, past family history, past medical history, past social history, past surgical history, and problem list.  Review of Systems   Musculoskeletal:  Positive for arthralgias, back pain, myalgias, neck pain and neck stiffness.     Physical Exam  Constitutional:       Appearance: Normal appearance.   Musculoskeletal:         General: Tenderness present. Normal range of motion.        Arms:       Cervical back: Normal range of motion. Rigidity and tenderness present.   Neurological:      Mental Status: She is alert and oriented to person,  place, and time.      Cranial Nerves: No cranial nerve deficit.      Gait: Gait is intact.      Deep Tendon Reflexes: Reflexes are normal and symmetric.   Psychiatric:         Attention and Perception: Attention normal.         Mood and Affect: Affect normal.         Speech: Speech normal.         Behavior: Behavior is cooperative.         Cognition and Memory: Cognition normal.     SOFT TISSUE ASSESSMENT: Hypertonicity and tenderness palpated C5-T6 erector spinae, upper traps, lev scap, SCM, scalene, rhomboid, suboccipitals T10-S1 Paraspinals, QL, R/L psoas, glutes, piriformis JOINT RECTRICTIONS: C5-T6 T10-S1, R SIJ ORTHO: Analisa unremarkable for centralization/peripheralization; max foraminal comp elicits local np R/L; shoulder depression elicits stiffness in R/L upper trap; brachial plexus tension test elicits neural tension in R/L UE; cervical distraction elicits relieves CC, SLUMP- neg, Sitting ROOT- neg, FABERE- R discomfort, SLR- Tightness , Iliac compression- R, Claire- Neg, Iliac distraction- R discomfort    Return in about 1 week (around 12/10/2024) for Recheck.

## 2024-12-10 ENCOUNTER — PROCEDURE VISIT (OUTPATIENT)
Age: 54
End: 2024-12-10
Payer: COMMERCIAL

## 2024-12-10 VITALS
BODY MASS INDEX: 27.82 KG/M2 | DIASTOLIC BLOOD PRESSURE: 83 MMHG | HEIGHT: 65 IN | SYSTOLIC BLOOD PRESSURE: 120 MMHG | HEART RATE: 80 BPM | WEIGHT: 167 LBS

## 2024-12-10 DIAGNOSIS — M99.04 SEGMENTAL DYSFUNCTION OF SACRAL REGION: ICD-10-CM

## 2024-12-10 DIAGNOSIS — M54.59 MECHANICAL LOW BACK PAIN: ICD-10-CM

## 2024-12-10 DIAGNOSIS — M99.03 SEGMENTAL DYSFUNCTION OF LUMBAR REGION: ICD-10-CM

## 2024-12-10 DIAGNOSIS — M99.02 SEGMENTAL DYSFUNCTION OF THORACIC REGION: Primary | ICD-10-CM

## 2024-12-10 DIAGNOSIS — M50.30 DDD (DEGENERATIVE DISC DISEASE), CERVICAL: ICD-10-CM

## 2024-12-10 DIAGNOSIS — M99.01 SEGMENTAL DYSFUNCTION OF CERVICAL REGION: ICD-10-CM

## 2024-12-10 PROCEDURE — 98942 CHIROPRACTIC MANJ 5 REGIONS: CPT | Performed by: CHIROPRACTOR

## 2024-12-10 NOTE — PROGRESS NOTES
Diagnoses and all orders for this visit:    Segmental dysfunction of thoracic region    Segmental dysfunction of lumbar region    Segmental dysfunction of cervical region    Segmental dysfunction of sacral region    DDD (degenerative disc disease), cervical    Mechanical low back pain      ASSESSMENT:  Pt's symptoms and exam findings consistent with degenerative disc disease in cervical spine and  lower back pain secondary to repetitive st/sp injury, exacerbated by postural/ergonomic stressors. Pt responded well to stretches and manual mobilization of the affected spinal and myofascial tissues with increased ROM; trial of conservative tx recommended consisting of stretching, ther-ex, graded mobilization/manipulation of the affected spinal/myofascial tissues, postural/ergonomic education and take home stretches/exercises. If symptoms fail to improve with short trial of conservative care, appropriate imaging and referral will be coordinated.  - The patient is feeling improvements in pain post-trx    PROCEDURE CODES: 63051    TREATMENT:  Fear avoidance behavior discussion, encouraged and reassured pt that natural course of condition is to improve over time with adherence to tx plan and home care strategies. Home care recommendations: avoid bed rest, walk (but avoid trails and uneven surfaces), gradual return to activity to tolerance (avoid anything that peripheralizes symptoms), ice 20 min on/off, watch for ice burn, call if symptoms peripheralize, worsen, or neurologic deficit progresses. Ther-ex: IASTM - discussed post procedure soreness and/or ecchymosis for up to 36 hrs, applied to affected mm hypertonicities; wall afshan, axial retraction, upper trap stretch, lev scap stretch, SCM stretch, lat rows with t-band, lat pull down with t-band, abdominal bracing; greater than 15 min spent performing above mentioned ther-ex. Thoracic mobilization/manipulation: prone P-A mob,; cervical mobilization/manipulation:  diversified supine graded mobilization, cervical traction, prone C/T jct HVLA, Lumbar mobilization/ Manipulation- Flexion -distraction, Shirley Drop HVLA    HPI:  Nancy Luna is a 54 y.o. female   Chief Complaint   Patient presents with   • Back Pain     Lower lumbar is achy pain. Pain score 3       The patient presents to the office with upper back and lower back pain. The patient has chronic pain in the upper back that has been aggravated by heavy lifting. Constant pain- aching. No radiation into upper back. Neck surgery May 2023 - parathyroid and had weakness with washing hair. Self care is stretching. Exercise routine- no routine, 11 and 9 y/o grand-kids. House stuff during her days. Scrubbing and reaching bothers it more. Walk frequency was better 3 months. 3/10 in the upper back and up to a 5-6/10.  The patient lower back-sitting-more than right away. No treatment. 3/14/23-CT scan on 6-7 with moderate narrowing on R and mild in L foramina  12/3- The patient reports mid to lower back pain after lifting heavy objects.   12/10- The patient reports pain level is 2-3/!0, she was sore after last visit but afterward improvements.     Back Pain      Past Medical History:   Diagnosis Date   • Depression Unsure    Feel down alot, feel lonely   • Hyperlipidemia    • Kidney stone Unsure   • Personal history of COVID-19 12/2021   • Status post parathyroidectomy 05/11/2023      The following portions of the patient's history were reviewed and updated as appropriate: allergies, past family history, past medical history, past social history, past surgical history, and problem list.  Review of Systems   Musculoskeletal:  Positive for arthralgias, back pain, myalgias, neck pain and neck stiffness.     Physical Exam  Constitutional:       Appearance: Normal appearance.   Musculoskeletal:         General: Tenderness present. Normal range of motion.        Arms:       Cervical back: Normal range of motion. Rigidity and  tenderness present.   Neurological:      Mental Status: She is alert and oriented to person, place, and time.      Cranial Nerves: No cranial nerve deficit.      Gait: Gait is intact.      Deep Tendon Reflexes: Reflexes are normal and symmetric.   Psychiatric:         Attention and Perception: Attention normal.         Mood and Affect: Affect normal.         Speech: Speech normal.         Behavior: Behavior is cooperative.         Cognition and Memory: Cognition normal.       SOFT TISSUE ASSESSMENT: Hypertonicity and tenderness palpated C5-T6 erector spinae, upper traps, lev scap, SCM, scalene, rhomboid, suboccipitals T10-S1 Paraspinals, QL, R/L psoas, glutes, piriformis JOINT RECTRICTIONS: C5-T6 T10-S1, R SIJ ORTHO: Analisa unremarkable for centralization/peripheralization; max foraminal comp elicits local np R/L; shoulder depression elicits stiffness in R/L upper trap; brachial plexus tension test elicits neural tension in R/L UE; cervical distraction elicits relieves CC, SLUMP- neg, Sitting ROOT- neg, FABERE- R discomfort, SLR- Tightness , Iliac compression- R, Claire- Neg, Iliac distraction- R discomfort    Return in about 1 week (around 12/17/2024) for Recheck.

## 2024-12-18 ENCOUNTER — OFFICE VISIT (OUTPATIENT)
Dept: FAMILY MEDICINE CLINIC | Facility: CLINIC | Age: 54
End: 2024-12-18
Payer: COMMERCIAL

## 2024-12-18 VITALS
HEART RATE: 90 BPM | DIASTOLIC BLOOD PRESSURE: 70 MMHG | SYSTOLIC BLOOD PRESSURE: 102 MMHG | WEIGHT: 166 LBS | HEIGHT: 65 IN | OXYGEN SATURATION: 99 % | BODY MASS INDEX: 27.66 KG/M2 | TEMPERATURE: 98.4 F

## 2024-12-18 DIAGNOSIS — F32.A ANXIETY AND DEPRESSION: ICD-10-CM

## 2024-12-18 DIAGNOSIS — F41.9 ANXIETY AND DEPRESSION: ICD-10-CM

## 2024-12-18 PROCEDURE — 99213 OFFICE O/P EST LOW 20 MIN: CPT

## 2024-12-18 RX ORDER — BUPROPION HYDROCHLORIDE 150 MG/1
150 TABLET ORAL EVERY MORNING
Qty: 90 TABLET | Refills: 1 | Status: SHIPPED | OUTPATIENT
Start: 2024-12-18

## 2024-12-18 NOTE — PROGRESS NOTES
"Name: Nancy Luna      : 1970      MRN: [id number]  Encounter Provider: Betty Alcaraz PA-C  Encounter Date: 2024   Encounter department: [facility]  :  Assessment & Plan  Anxiety and depression  Doing well since tapering off lexparo and starting wellbutrin 150 mg QD -- reports improvement in anxiety/depression symptoms with no reportable side effects  Denies any SI; advised ER if she did experience this  Will continue current medication and dosage  Follow up in three months, sooner as needed  To call with any questions/concerns    Orders:    buPROPion (WELLBUTRIN XL) 150 mg 24 hr tablet; Take 1 tablet (150 mg total) by mouth every morning           History of Present Illness     CC: four week medication check     Patient presents for four week medication check. She has tapered off lexapro and started wellbutrin 150 mg QD which she has been on for the last four weeks. Repots she has been doing well on the wellbutrin, no side effects and feels anxiety/depression has gotten better. She'd like to continue on current dose for now. No SI.         Review of Systems   Constitutional:  Negative for chills, diaphoresis and fever.   Respiratory:  Negative for cough, chest tightness, shortness of breath and wheezing.    Cardiovascular:  Negative for chest pain and palpitations.   Neurological:  Negative for dizziness, light-headedness and headaches.   Psychiatric/Behavioral:  Negative for self-injury and suicidal ideas. The patient is not nervous/anxious.        Objective   /70 (BP Location: Left arm, Patient Position: Sitting, Cuff Size: Large)   Pulse 90   Temp 98.4 °F (36.9 °C)   Ht 5' 5\" (1.651 m)   Wt 75.3 kg (166 lb)   LMP 2023 (Exact Date)   SpO2 99%   BMI 27.62 kg/m²      Physical Exam  Vitals reviewed.   Constitutional:       General: She is not in acute distress.     Appearance: Normal appearance. She is not ill-appearing or diaphoretic.   HENT:      Head: " Normocephalic and atraumatic.      Right Ear: External ear normal.      Left Ear: External ear normal.      Nose: Nose normal.      Mouth/Throat:      Mouth: Mucous membranes are moist.   Eyes:      General:         Right eye: No discharge.         Left eye: No discharge.      Conjunctiva/sclera: Conjunctivae normal.   Cardiovascular:      Rate and Rhythm: Normal rate and regular rhythm.      Pulses: Normal pulses.      Heart sounds: Normal heart sounds. No murmur heard.  Pulmonary:      Effort: Pulmonary effort is normal. No respiratory distress.      Breath sounds: Normal breath sounds. No wheezing, rhonchi or rales.   Musculoskeletal:         General: Normal range of motion.      Cervical back: Normal range of motion.      Right lower leg: No edema.      Left lower leg: No edema.   Skin:     General: Skin is warm.   Neurological:      General: No focal deficit present.      Mental Status: She is alert.      Gait: Gait normal.   Psychiatric:         Mood and Affect: Mood normal.

## 2024-12-18 NOTE — ASSESSMENT & PLAN NOTE
Doing well since tapering off lexparo and starting wellbutrin 150 mg QD -- reports improvement in anxiety/depression symptoms with no reportable side effects  Denies any SI; advised ER if she did experience this  Will continue current medication and dosage  Follow up in three months, sooner as needed  To call with any questions/concerns    Orders:    buPROPion (WELLBUTRIN XL) 150 mg 24 hr tablet; Take 1 tablet (150 mg total) by mouth every morning

## 2024-12-26 LAB — COLOGUARD RESULT REPORTABLE: NEGATIVE

## 2025-01-09 ENCOUNTER — PROCEDURE VISIT (OUTPATIENT)
Age: 55
End: 2025-01-09
Payer: COMMERCIAL

## 2025-01-09 VITALS
BODY MASS INDEX: 27.66 KG/M2 | HEIGHT: 65 IN | DIASTOLIC BLOOD PRESSURE: 79 MMHG | WEIGHT: 166 LBS | SYSTOLIC BLOOD PRESSURE: 112 MMHG | HEART RATE: 78 BPM

## 2025-01-09 DIAGNOSIS — M50.30 DDD (DEGENERATIVE DISC DISEASE), CERVICAL: ICD-10-CM

## 2025-01-09 DIAGNOSIS — M99.02 SEGMENTAL DYSFUNCTION OF THORACIC REGION: Primary | ICD-10-CM

## 2025-01-09 DIAGNOSIS — M99.03 SEGMENTAL DYSFUNCTION OF LUMBAR REGION: ICD-10-CM

## 2025-01-09 DIAGNOSIS — M99.01 SEGMENTAL DYSFUNCTION OF CERVICAL REGION: ICD-10-CM

## 2025-01-09 DIAGNOSIS — M54.59 MECHANICAL LOW BACK PAIN: ICD-10-CM

## 2025-01-09 DIAGNOSIS — M99.04 SEGMENTAL DYSFUNCTION OF SACRAL REGION: ICD-10-CM

## 2025-01-09 PROCEDURE — 98942 CHIROPRACTIC MANJ 5 REGIONS: CPT | Performed by: CHIROPRACTOR

## 2025-01-09 NOTE — PROGRESS NOTES
Diagnoses and all orders for this visit:    Segmental dysfunction of thoracic region    Segmental dysfunction of lumbar region    Segmental dysfunction of cervical region    Segmental dysfunction of sacral region    DDD (degenerative disc disease), cervical    Mechanical low back pain      ASSESSMENT:  Pt's symptoms and exam findings consistent with degenerative disc disease in cervical spine and  lower back pain secondary to repetitive st/sp injury, exacerbated by postural/ergonomic stressors. Pt responded well to stretches and manual mobilization of the affected spinal and myofascial tissues with increased ROM; trial of conservative tx recommended consisting of stretching, ther-ex, graded mobilization/manipulation of the affected spinal/myofascial tissues, postural/ergonomic education and take home stretches/exercises. If symptoms fail to improve with short trial of conservative care, appropriate imaging and referral will be coordinated.  - The patient is feeling improvements in pain post-trx    PROCEDURE CODES: 28961    TREATMENT:  Fear avoidance behavior discussion, encouraged and reassured pt that natural course of condition is to improve over time with adherence to tx plan and home care strategies. Home care recommendations: avoid bed rest, walk (but avoid trails and uneven surfaces), gradual return to activity to tolerance (avoid anything that peripheralizes symptoms), ice 20 min on/off, watch for ice burn, call if symptoms peripheralize, worsen, or neurologic deficit progresses. Ther-ex: IASTM - discussed post procedure soreness and/or ecchymosis for up to 36 hrs, applied to affected mm hypertonicities; wall afshan, axial retraction, upper trap stretch, lev scap stretch, SCM stretch, lat rows with t-band, lat pull down with t-band, abdominal bracing; greater than 15 min spent performing above mentioned ther-ex. Thoracic mobilization/manipulation: prone P-A mob,; cervical mobilization/manipulation:  diversified supine graded mobilization, cervical traction, prone C/T jct HVLA, Lumbar mobilization/ Manipulation- Flexion -distraction, Shirley Drop HVLA    HPI:  Nancy Luna is a 54 y.o. female   Chief Complaint   Patient presents with   • Neck - Follow-up     Neck is sore . Pain score 2     • Back Pain     Lower lumbar pain that radiates into left hip with patient states hard to move hip. Patient states achy and sore . Pain score 7   Patient states more in the mornings       The patient presents to the office with upper back and lower back pain. The patient has chronic pain in the upper back that has been aggravated by heavy lifting. Constant pain- aching. No radiation into upper back. Neck surgery May 2023 - parathyroid and had weakness with washing hair. Self care is stretching. Exercise routine- no routine, 11 and 9 y/o grand-kids. House stuff during her days. Scrubbing and reaching bothers it more. Walk frequency was better 3 months. 3/10 in the upper back and up to a 5-6/10.  The patient lower back-sitting-more than right away. No treatment. 3/14/23-CT scan on 6-7 with moderate narrowing on R and mild in L foramina  12/3- The patient reports mid to lower back pain after lifting heavy objects.   12/10- The patient reports pain level is 2-3/10, she was sore after last visit but afterward improvements.   1/9/25- The patient 7/10 pain in the L hip and lower back, in between shoulders 3-4/10. 2/10.    Back Pain    Past Medical History:   Diagnosis Date   • Depression Unsure    Feel down alot, feel lonely   • Hyperlipidemia    • Kidney stone Unsure   • Personal history of COVID-19 12/2021   • Status post parathyroidectomy 05/11/2023      The following portions of the patient's history were reviewed and updated as appropriate: allergies, past family history, past medical history, past social history, past surgical history, and problem list.  Review of Systems   Musculoskeletal:  Positive for arthralgias, back  pain, myalgias, neck pain and neck stiffness.     Physical Exam  Constitutional:       Appearance: Normal appearance.   Musculoskeletal:         General: Tenderness present. Normal range of motion.        Arms:       Cervical back: Normal range of motion. Rigidity and tenderness present.   Neurological:      Mental Status: She is alert and oriented to person, place, and time.      Cranial Nerves: No cranial nerve deficit.      Gait: Gait is intact.      Deep Tendon Reflexes: Reflexes are normal and symmetric.   Psychiatric:         Attention and Perception: Attention normal.         Mood and Affect: Affect normal.         Speech: Speech normal.         Behavior: Behavior is cooperative.         Cognition and Memory: Cognition normal.     SOFT TISSUE ASSESSMENT: Hypertonicity and tenderness palpated C5-T6 erector spinae, upper traps, lev scap, SCM, scalene, rhomboid, suboccipitals T10-S1 Paraspinals, QL, R/L psoas, glutes, piriformis JOINT RECTRICTIONS: C5-T6 T10-S1, R SIJ ORTHO: Analisa unremarkable for centralization/peripheralization; max foraminal comp elicits local np R/L; shoulder depression elicits stiffness in R/L upper trap; brachial plexus tension test elicits neural tension in R/L UE; cervical distraction elicits relieves CC, SLUMP- neg, Sitting ROOT- neg, FABERE- R discomfort, SLR- Tightness , Iliac compression- R, Claire- Neg, Iliac distraction- R discomfort    Return in about 1 week (around 1/16/2025) for Recheck.

## 2025-01-21 ENCOUNTER — PROCEDURE VISIT (OUTPATIENT)
Age: 55
End: 2025-01-21
Payer: COMMERCIAL

## 2025-01-21 VITALS — WEIGHT: 166 LBS | HEIGHT: 65 IN | BODY MASS INDEX: 27.66 KG/M2

## 2025-01-21 DIAGNOSIS — M99.02 SEGMENTAL DYSFUNCTION OF THORACIC REGION: Primary | ICD-10-CM

## 2025-01-21 DIAGNOSIS — M99.04 SEGMENTAL DYSFUNCTION OF SACRAL REGION: ICD-10-CM

## 2025-01-21 DIAGNOSIS — M50.30 DDD (DEGENERATIVE DISC DISEASE), CERVICAL: ICD-10-CM

## 2025-01-21 DIAGNOSIS — M54.59 MECHANICAL LOW BACK PAIN: ICD-10-CM

## 2025-01-21 DIAGNOSIS — M99.03 SEGMENTAL DYSFUNCTION OF LUMBAR REGION: ICD-10-CM

## 2025-01-21 DIAGNOSIS — M99.01 SEGMENTAL DYSFUNCTION OF CERVICAL REGION: ICD-10-CM

## 2025-01-21 PROCEDURE — 98942 CHIROPRACTIC MANJ 5 REGIONS: CPT | Performed by: CHIROPRACTOR

## 2025-01-21 NOTE — PROGRESS NOTES
Diagnoses and all orders for this visit:    Segmental dysfunction of thoracic region    Segmental dysfunction of lumbar region    Segmental dysfunction of cervical region    Segmental dysfunction of sacral region    DDD (degenerative disc disease), cervical    Mechanical low back pain      ASSESSMENT:  Pt's symptoms and exam findings consistent with degenerative disc disease in cervical spine and  lower back pain secondary to repetitive st/sp injury, exacerbated by postural/ergonomic stressors. Pt responded well to stretches and manual mobilization of the affected spinal and myofascial tissues with increased ROM; trial of conservative tx recommended consisting of stretching, ther-ex, graded mobilization/manipulation of the affected spinal/myofascial tissues, postural/ergonomic education and take home stretches/exercises. If symptoms fail to improve with short trial of conservative care, appropriate imaging and referral will be coordinated.  - The patient is feeling improvements in pain post-trx. Making great progress, will F/U 3-4 wks    PROCEDURE CODES: 13825    TREATMENT:  Fear avoidance behavior discussion, encouraged and reassured pt that natural course of condition is to improve over time with adherence to tx plan and home care strategies. Home care recommendations: avoid bed rest, walk (but avoid trails and uneven surfaces), gradual return to activity to tolerance (avoid anything that peripheralizes symptoms), ice 20 min on/off, watch for ice burn, call if symptoms peripheralize, worsen, or neurologic deficit progresses. Ther-ex: IASTM - discussed post procedure soreness and/or ecchymosis for up to 36 hrs, applied to affected mm hypertonicities; wall afshan, axial retraction, upper trap stretch, lev scap stretch, SCM stretch, lat rows with t-band, lat pull down with t-band, abdominal bracing; greater than 15 min spent performing above mentioned ther-ex. Thoracic mobilization/manipulation: prone P-A  mob,; cervical mobilization/manipulation: diversified supine graded mobilization, cervical traction, prone C/T jct HVLA, Lumbar mobilization/ Manipulation- Flexion -distraction, Shirley Drop HVLA    HPI:  Nancy Luna is a 54 y.o. female   Chief Complaint   Patient presents with   • Back Pain     Lower lumbar pain score 1   Patient states feeling good.        The patient presents to the office with upper back and lower back pain. The patient has chronic pain in the upper back that has been aggravated by heavy lifting. Constant pain- aching. No radiation into upper back. Neck surgery May 2023 - parathyroid and had weakness with washing hair. Self care is stretching. Exercise routine- no routine, 11 and 9 y/o grand-kids. House stuff during her days. Scrubbing and reaching bothers it more. Walk frequency was better 3 months. 3/10 in the upper back and up to a 5-6/10.  The patient lower back-sitting-more than right away. No treatment. 3/14/23-CT scan on 6-7 with moderate narrowing on R and mild in L foramina  12/3- The patient reports mid to lower back pain after lifting heavy objects.   12/10- The patient reports pain level is 2-3/10, she was sore after last visit but afterward improvements.   1/9/25- The patient 7/10 pain in the L hip and lower back, in between shoulders 3-4/10. 2/10.  1/21/25- The patient reports she is feeling better, 1/10    Back Pain      Past Medical History:   Diagnosis Date   • Depression Unsure    Feel down alot, feel lonely   • Hyperlipidemia    • Kidney stone Unsure   • Personal history of COVID-19 12/2021   • Status post parathyroidectomy 05/11/2023      The following portions of the patient's history were reviewed and updated as appropriate: allergies, past family history, past medical history, past social history, past surgical history, and problem list.  Review of Systems   Musculoskeletal:  Positive for arthralgias, back pain, myalgias, neck pain and neck stiffness.     Physical  Exam  Constitutional:       Appearance: Normal appearance.   Musculoskeletal:         General: Tenderness present. Normal range of motion.        Arms:       Cervical back: Normal range of motion. Rigidity and tenderness present.   Neurological:      Mental Status: She is alert and oriented to person, place, and time.      Cranial Nerves: No cranial nerve deficit.      Gait: Gait is intact.      Deep Tendon Reflexes: Reflexes are normal and symmetric.   Psychiatric:         Attention and Perception: Attention normal.         Mood and Affect: Affect normal.         Speech: Speech normal.         Behavior: Behavior is cooperative.         Cognition and Memory: Cognition normal.       SOFT TISSUE ASSESSMENT: Hypertonicity and tenderness palpated C5-T6 erector spinae, upper traps, lev scap, SCM, scalene, rhomboid, suboccipitals T10-S1 Paraspinals, QL, R/L psoas, glutes, piriformis JOINT RECTRICTIONS: C5-T6 T10-S1, R SIJ ORTHO: Analisa unremarkable for centralization/peripheralization; max foraminal comp elicits local np R/L; shoulder depression elicits stiffness in R/L upper trap; brachial plexus tension test elicits neural tension in R/L UE; cervical distraction elicits relieves CC, SLUMP- neg, Sitting ROOT- neg, FABERE- R discomfort, SLR- Tightness , Iliac compression- R, Claire- Neg, Iliac distraction- R discomfort    Return in about 3 weeks (around 2/11/2025), or 3-4 wks, for Recheck.

## 2025-01-22 ENCOUNTER — HOSPITAL ENCOUNTER (OUTPATIENT)
Dept: ULTRASOUND IMAGING | Facility: CLINIC | Age: 55
Discharge: HOME | End: 2025-01-22
Payer: COMMERCIAL

## 2025-01-22 ENCOUNTER — HOSPITAL ENCOUNTER (OUTPATIENT)
Dept: MAMMOGRAPHY | Facility: CLINIC | Age: 55
Discharge: HOME | End: 2025-01-22
Payer: COMMERCIAL

## 2025-01-22 VITALS — WEIGHT: 166 LBS | HEIGHT: 65 IN | BODY MASS INDEX: 27.66 KG/M2

## 2025-01-22 DIAGNOSIS — R92.30 DENSE BREAST: ICD-10-CM

## 2025-01-22 DIAGNOSIS — Z12.31 ENCOUNTER FOR SCREENING MAMMOGRAM FOR MALIGNANT NEOPLASM OF BREAST: ICD-10-CM

## 2025-01-22 PROCEDURE — 77067 SCR MAMMO BI INCL CAD: CPT

## 2025-01-22 PROCEDURE — 76641 ULTRASOUND BREAST COMPLETE: CPT

## 2025-01-22 PROCEDURE — 77063 BREAST TOMOSYNTHESIS BI: CPT

## 2025-01-28 ENCOUNTER — RESULTS FOLLOW-UP (OUTPATIENT)
Dept: OBGYN CLINIC | Facility: CLINIC | Age: 55
End: 2025-01-28

## 2025-02-02 DIAGNOSIS — E55.9 VITAMIN D DEFICIENCY: ICD-10-CM

## 2025-02-03 RX ORDER — ERGOCALCIFEROL 1.25 MG/1
50000 CAPSULE, LIQUID FILLED ORAL WEEKLY
Qty: 12 CAPSULE | Refills: 0 | OUTPATIENT
Start: 2025-02-03

## 2025-02-03 NOTE — TELEPHONE ENCOUNTER
Physical Therapy  Visit Type: treatment  Precautions:  Medical precautions:  fall risk; standard precautions.    Lines:     Basic: capped IV      Lines in chart and on patient reviewed, cautions maintained throughout session.      SUBJECTIVE                                                                                                            Patient agreed to participate in therapy this date.  Pt willing to get up. Feels weak      OBJECTIVE                                                                                                                Patient activity tolerance: 1 to 1 activity to rest  Bed Mobility:    Rolling left: modified independent    Supine to sit: modified independent    Sit to supine: modified independent  Training completed:      Effort, but no assist  Transfers:      Sit to stand: supervision    Stand to sit: supervision  Training completed:      Transfer reps from side of bed to build strength with slight increased ease with reps  Gait/Ambulation:      Distance (ft): 180  Training Completed:      3 standing rests due to fatigue, but pt wanting to go further. More guarded pace with slight lateral sway at times and minimal head movements during ambulation.                ASSESSMENT                                                                                                                Impairments: activity tolerance and strength  Functional Limitations: all functional mobility    Patient seen on 5th floor nursing unit. He presents near baseline  which was independent  with mobility. Currently lives with sister in a(n) 2 story house.  For safe return to prior living situation the patient needs to be modified independent  for overall mobility.  Pt admitted with upper GI bleed, chronic alcoholism. Hx includes anxiety/depression, L ankle ORIF January 2020.    Physical Therapy treatment session today focused on transfer reps to build strength and progression of ambulation in [place]. Pt  Yes wait until we have updated level  continues to require increased effort for all mobility and mild gait instability, but no specific LOB. Slower gait speed with 3 standing rests. Pt reports getting up in room on his own. Plan remains for home d/c.         Discharge Recommendations  Recommendation for Discharge: PT WI: Home             PT/OT Mobility Equipment for Discharge: none      PT Identified Barriers to Discharge: balance     Skilled therapy is required to address these limitations in attempt to maximize the patient's independence.  Progress: progressing toward goals    End of Session:   Location: in bed            PLAN                                                                                                                            Suggestions for next session as indicated: Steps, LE HEP prn          Frequency Comments: X M-F, (H), KA/LH, RL           Goals Reviewed: 10/2/2020  GOALS:  Long Term Goals: (to be met by time of discharge from hospital)  Sit to supine: Patient will complete sit to supine independent.  Supine to sit: Patient will complete supine to sit independent.  Sit to stand: Patient will complete sit to stand transfer with independent.   Stand to sit: Patient will complete stand to sit transfer with independent.   Ambulation (even): Patient will ambulate on even surface for 150 feet with independent.   Flight of stairs: Patient will ambulate a flight of stairs with using 1 rail, modified independent.       Documented in the chart in the following areas: Pain. Assessment.

## 2025-02-03 NOTE — TELEPHONE ENCOUNTER
Notified and she just took her last Vit D 50,000 unit tab yesterday. Should she wait to get tested?

## 2025-02-07 ENCOUNTER — TELEPHONE (OUTPATIENT)
Age: 55
End: 2025-02-07

## 2025-02-07 NOTE — TELEPHONE ENCOUNTER
Patient states she spoke with Betty about a possible increase in   buPROPion (WELLBUTRIN XL) 150 mg 24 hr tablet  . She is requesting an increase if possible, wondering if an appointment would be needed first. Patient also states there was a mix up with her insurance so right now she is uninsured. She is wondering if the increase would be able to be supplemented but cutting tablets or how this can be handled. Patient requests call back to discuss.

## 2025-02-10 DIAGNOSIS — F32.A ANXIETY AND DEPRESSION: Primary | ICD-10-CM

## 2025-02-10 DIAGNOSIS — F41.9 ANXIETY AND DEPRESSION: Primary | ICD-10-CM

## 2025-02-10 RX ORDER — BUPROPION HYDROCHLORIDE 300 MG/1
300 TABLET ORAL EVERY MORNING
Qty: 30 TABLET | Refills: 0 | Status: SHIPPED | OUTPATIENT
Start: 2025-02-10 | End: 2025-08-09

## 2025-02-10 NOTE — TELEPHONE ENCOUNTER
Sure - I sent in higher dose, wellbutrin 300 mg QD in. If she has any issues on higher dose please have her let me know.

## 2025-02-10 NOTE — TELEPHONE ENCOUNTER
Patient returned call to office. Patient informed a higher dose of Wellbutrin 300 mg was sent to [facility] pharmacy today 2/10. Patient informed to return call if she has any issues with the new dose.     Patient expressed understanding.

## 2025-02-19 ENCOUNTER — TELEPHONE (OUTPATIENT)
Age: 55
End: 2025-02-19

## 2025-02-19 NOTE — TELEPHONE ENCOUNTER
It looks like patient follows with OBGYN. I would recommend calling them to potentially schedule a visit/order hormone levels for her symptoms, as they are more knowledgeable on the correct labs to order and how to interpret them for menopause.

## 2025-02-19 NOTE — TELEPHONE ENCOUNTER
Patient called,    Requesting an order for lab work to check estrogen levels due to low mood, and low energy, patient states she is going through menopause.    Please Advise Betty, and notify patient, thank you.

## 2025-02-20 ENCOUNTER — TELEPHONE (OUTPATIENT)
Age: 55
End: 2025-02-20

## 2025-02-20 NOTE — TELEPHONE ENCOUNTER
Patient calling in, she is requesting blood work orders to check for menopause.  She saw Dr. Adames in the office for her annual 1/28/25.  She reports symptoms of low mood and fatigue. Will review with provider.

## 2025-03-14 DIAGNOSIS — F32.A ANXIETY AND DEPRESSION: ICD-10-CM

## 2025-03-14 DIAGNOSIS — F41.9 ANXIETY AND DEPRESSION: ICD-10-CM

## 2025-03-14 RX ORDER — BUPROPION HYDROCHLORIDE 300 MG/1
300 TABLET ORAL EVERY MORNING
Qty: 30 TABLET | Refills: 0 | Status: SHIPPED | OUTPATIENT
Start: 2025-03-14

## 2025-03-17 ENCOUNTER — APPOINTMENT (OUTPATIENT)
Dept: LAB | Facility: CLINIC | Age: 55
End: 2025-03-17
Payer: COMMERCIAL

## 2025-03-17 DIAGNOSIS — E55.9 VITAMIN D DEFICIENCY: ICD-10-CM

## 2025-03-17 PROCEDURE — 36415 COLL VENOUS BLD VENIPUNCTURE: CPT

## 2025-03-17 PROCEDURE — 82306 VITAMIN D 25 HYDROXY: CPT

## 2025-03-18 ENCOUNTER — OFFICE VISIT (OUTPATIENT)
Dept: FAMILY MEDICINE CLINIC | Facility: CLINIC | Age: 55
End: 2025-03-18
Payer: COMMERCIAL

## 2025-03-18 ENCOUNTER — TELEPHONE (OUTPATIENT)
Age: 55
End: 2025-03-18

## 2025-03-18 VITALS
HEIGHT: 65 IN | OXYGEN SATURATION: 99 % | WEIGHT: 168.2 LBS | DIASTOLIC BLOOD PRESSURE: 78 MMHG | BODY MASS INDEX: 28.02 KG/M2 | TEMPERATURE: 97.9 F | HEART RATE: 86 BPM | SYSTOLIC BLOOD PRESSURE: 112 MMHG

## 2025-03-18 DIAGNOSIS — F41.9 ANXIETY AND DEPRESSION: ICD-10-CM

## 2025-03-18 DIAGNOSIS — M62.81 GENERALIZED MUSCLE WEAKNESS: Primary | ICD-10-CM

## 2025-03-18 DIAGNOSIS — E55.9 VITAMIN D DEFICIENCY: ICD-10-CM

## 2025-03-18 DIAGNOSIS — F32.A ANXIETY AND DEPRESSION: ICD-10-CM

## 2025-03-18 DIAGNOSIS — M25.50 POLYARTHRALGIA: ICD-10-CM

## 2025-03-18 LAB — 25(OH)D3 SERPL-MCNC: 26.2 NG/ML (ref 30–100)

## 2025-03-18 PROCEDURE — 99214 OFFICE O/P EST MOD 30 MIN: CPT

## 2025-03-18 RX ORDER — ERGOCALCIFEROL 1.25 MG/1
50000 CAPSULE ORAL WEEKLY
Qty: 12 CAPSULE | Refills: 0 | Status: SHIPPED | OUTPATIENT
Start: 2025-03-18

## 2025-03-18 NOTE — ASSESSMENT & PLAN NOTE
Vitamin D level checked yesterday -- Improved compared to four months ago but still low. Will continue vitamin D 50,000 units weekly for an additional 12 weeks then recheck levels again.     Orders:    Vitamin D, Ergocalciferol, 13238 units CAPS; Take 50,000 Units by mouth once a week    Vitamin D 25 hydroxy; Future

## 2025-03-18 NOTE — ASSESSMENT & PLAN NOTE
Depression Screening Follow-up Plan: Patient's depression screening was positive with a PHQ-9 score of 6. Patient with underlying depression and was advised to continue current medications as prescribed.    Reports well controlled on current wellbutrin 300 mg QD. Mood is stable; denies SI. Therefore will continue.

## 2025-03-18 NOTE — PROGRESS NOTES
Name: Nancy Luna      : 1970      MRN: [id number]  Encounter Provider: Betty Alcaraz PA-C  Encounter Date: 3/18/2025   Encounter department: [facility]  :  Assessment & Plan  Vitamin D deficiency  Vitamin D level checked yesterday -- Improved compared to four months ago but still low. Will continue vitamin D 50,000 units weekly for an additional 12 weeks then recheck levels again.     Orders:    Vitamin D, Ergocalciferol, 62320 units CAPS; Take 50,000 Units by mouth once a week    Vitamin D 25 hydroxy; Future    Generalized muscle weakness  Chronic ongoing generalized muscle weakness and pain for years  Will check autoimmune work up, inflammatory markers, magnesium, and CK for further evaluation  Did discuss PT referral vs cervical spine XR given most of her symptoms are in her upper extremities; she defers and would like to wait until we get labs first  Will follow up with lab results    Orders:    RHEUMATOID FACTOR; Future    BA Screen w/Reflex Cascade; Future    Cyclic citrul peptide antibody, IgG; Future    CBC and differential; Future    Magnesium; Future    Sedimentation rate, automated; Future    C-reactive protein; Future    CK; Future    Polyarthralgia    Orders:    RHEUMATOID FACTOR; Future    BA Screen w/Reflex Cascade; Future    Cyclic citrul peptide antibody, IgG; Future    CBC and differential; Future    Magnesium; Future    Sedimentation rate, automated; Future    C-reactive protein; Future    CK; Future    Anxiety and depression  Depression Screening Follow-up Plan: Patient's depression screening was positive with a PHQ-9 score of 6. Patient with underlying depression and was advised to continue current medications as prescribed.    Reports well controlled on current wellbutrin 300 mg QD. Mood is stable; denies SI. Therefore will continue.               History of Present Illness   CC: three month follow up    Patient presents for three month depression follow  "up. Started on wellbutrin about four months ago after discontinuing lexapro. Reports mood is still well controlled on wellbutrin, no SI.     Patient reports muscle weakness and pain x \"years\" -- mostly in both arms, but sometimes her legs as well. No numbness/tingling. No tick bites.       Review of Systems   Constitutional:  Negative for chills, diaphoresis and fever.   Respiratory:  Negative for cough, chest tightness, shortness of breath and wheezing.    Cardiovascular:  Negative for chest pain and palpitations.   Neurological:  Positive for weakness. Negative for dizziness, light-headedness and headaches.   Psychiatric/Behavioral:  Negative for self-injury and suicidal ideas.        Objective   /78   Pulse 86   Temp 97.9 °F (36.6 °C)   Ht 5' 5\" (1.651 m)   Wt 76.3 kg (168 lb 3.2 oz)   LMP 08/01/2023 (Exact Date)   SpO2 99%   BMI 27.99 kg/m²      Physical Exam  Vitals reviewed.   Constitutional:       General: She is not in acute distress.     Appearance: Normal appearance. She is not ill-appearing or diaphoretic.   HENT:      Head: Normocephalic and atraumatic.      Right Ear: External ear normal.      Left Ear: External ear normal.      Nose: Nose normal.      Mouth/Throat:      Mouth: Mucous membranes are moist.   Eyes:      General:         Right eye: No discharge.         Left eye: No discharge.      Conjunctiva/sclera: Conjunctivae normal.   Cardiovascular:      Rate and Rhythm: Normal rate and regular rhythm.      Heart sounds: Normal heart sounds. No murmur heard.  Pulmonary:      Effort: Pulmonary effort is normal. No respiratory distress.      Breath sounds: Normal breath sounds. No wheezing, rhonchi or rales.   Musculoskeletal:         General: Normal range of motion.      Cervical back: Normal range of motion.      Right lower leg: No edema.      Left lower leg: No edema.   Skin:     General: Skin is warm.   Neurological:      General: No focal deficit present.      Mental Status: She is " alert.      Gait: Gait normal.   Psychiatric:         Mood and Affect: Mood normal.

## 2025-03-20 ENCOUNTER — RESULTS FOLLOW-UP (OUTPATIENT)
Dept: FAMILY MEDICINE CLINIC | Facility: CLINIC | Age: 55
End: 2025-03-20

## 2025-03-20 LAB
25(OH)D3 SERPL-MCNC: 43 NG/ML (ref 30–100)
ANA SER QL: NEGATIVE
BASOPHILS # BLD AUTO: 52 CELLS/UL (ref 0–200)
BASOPHILS NFR BLD AUTO: 1.1 %
CCP IGG SERPL-ACNC: <16 UNITS
CK SERPL-CCNC: 75 U/L (ref 21–240)
CRP SERPL-MCNC: <3 MG/L
EOSINOPHIL # BLD AUTO: 193 CELLS/UL (ref 15–500)
EOSINOPHIL NFR BLD AUTO: 4.1 %
ERYTHROCYTE [DISTWIDTH] IN BLOOD BY AUTOMATED COUNT: 12.8 % (ref 11–15)
ERYTHROCYTE [SEDIMENTATION RATE] IN BLOOD BY WESTERGREN METHOD: 11 MM/H
HCT VFR BLD AUTO: 41.8 % (ref 35–45)
HGB BLD-MCNC: 13.6 G/DL (ref 11.7–15.5)
LYMPHOCYTES # BLD AUTO: 1744 CELLS/UL (ref 850–3900)
LYMPHOCYTES NFR BLD AUTO: 37.1 %
MAGNESIUM SERPL-MCNC: 2.3 MG/DL (ref 1.5–2.5)
MCH RBC QN AUTO: 29.6 PG (ref 27–33)
MCHC RBC AUTO-ENTMCNC: 32.5 G/DL (ref 32–36)
MCV RBC AUTO: 91.1 FL (ref 80–100)
MONOCYTES # BLD AUTO: 385 CELLS/UL (ref 200–950)
MONOCYTES NFR BLD AUTO: 8.2 %
NEUTROPHILS # BLD AUTO: 2327 CELLS/UL (ref 1500–7800)
NEUTROPHILS NFR BLD AUTO: 49.5 %
PLATELET # BLD AUTO: 281 THOUSAND/UL (ref 140–400)
PMV BLD REES-ECKER: 10.6 FL (ref 7.5–12.5)
RBC # BLD AUTO: 4.59 MILLION/UL (ref 3.8–5.1)
RHEUMATOID FACT SERPL-ACNC: <10 IU/ML
WBC # BLD AUTO: 4.7 THOUSAND/UL (ref 3.8–10.8)

## 2025-03-21 DIAGNOSIS — R29.898 UPPER EXTREMITY WEAKNESS: Primary | ICD-10-CM

## 2025-03-24 ENCOUNTER — RESULTS FOLLOW-UP (OUTPATIENT)
Dept: FAMILY MEDICINE CLINIC | Facility: CLINIC | Age: 55
End: 2025-03-24

## 2025-03-24 ENCOUNTER — APPOINTMENT (OUTPATIENT)
Dept: RADIOLOGY | Facility: CLINIC | Age: 55
End: 2025-03-24
Payer: COMMERCIAL

## 2025-03-24 DIAGNOSIS — M54.2 PAIN OF CERVICAL SPINE: ICD-10-CM

## 2025-03-24 DIAGNOSIS — R29.898 UPPER EXTREMITY WEAKNESS: ICD-10-CM

## 2025-03-24 DIAGNOSIS — R29.898 UPPER EXTREMITY WEAKNESS: Primary | ICD-10-CM

## 2025-03-24 PROCEDURE — 72050 X-RAY EXAM NECK SPINE 4/5VWS: CPT

## 2025-04-07 ENCOUNTER — TELEPHONE (OUTPATIENT)
Dept: PAIN MEDICINE | Facility: CLINIC | Age: 55
End: 2025-04-07

## 2025-04-10 ENCOUNTER — TELEPHONE (OUTPATIENT)
Dept: OBGYN CLINIC | Facility: CLINIC | Age: 55
End: 2025-04-10

## 2025-04-10 NOTE — PROGRESS NOTES
Assessment:  1. Myofascial pain syndrome    2. Upper extremity weakness    3. Pain of cervical spine        Plan:  Orders Placed This Encounter   Procedures    Durable Medical Equipment     1 TENS UNIT, 4 lead    EMG 2 Limb Upper Extremity     Standing Status:   Future     Expiration Date:   4/14/2026     Possible Diagnosis::   median neuropathy (carpal tunnel syndrome)     Possible Diagnosis::   cervical neuropathy     Possible Diagnosis::   ulnar neuropathy     Does the patient have an external cardiac device (LVAD)?:   No       No orders of the defined types were placed in this encounter.      My impressions and treatment recommendations were discussed in detail with the patient, who verbalized understanding and had no further questions.    54-year-old female presents her office with chief complaint of bilateral upper extremity weakness and neck pain for last 2 years.  Reports weakness has been progressively getting worse over the last several months.  She denies other symptoms suggestive of cervical myelopathy.  No gait dysfunction.  Appears to have normal and equal bilateral upper and lower extremity reflexes.  No fine motor deficits.  Clinically, cervical cord compression is low on differential.  There is notable tenderness over the bilateral cervical and trapezius musculature indicative of myofascial pain syndrome.    Etiology of upper arm weakness unclear.  Appears to be less likely secondary to cervical radiculopathy given nonfocal pattern to her symptoms.  Will order EMG of the bilateral upper extremities. If no significant pathology then will refer to neurology.     TENS unit also ordered for myofascial pain.    [place] Prescription Drug Monitoring Program report was reviewed and was appropriate     Complete risks and benefits including bleeding, infection, tissue reaction, nerve injury and allergic reaction were discussed. The approach was demonstrated using models and literature was provided.  Verbal and written consent was obtained.    Discharge instructions were provided. I personally saw and examined the patient and I agree with the above discussed plan of care.    History of Present Illness:    Nancy Luna is a 54 y.o. female who presents to [facility] for initial evaluation of the above stated pain complaints. The patient has a past medical and chronic pain history as outlined in the assessment section. She was referred by Betty Alcaraz PA-C  [address]   [address]  [place] [address].    Chief complaint of neck pain and arm weakness for over 2 years.  Pain is severe over the past 1.  8 out of 10.  Constant.  Dull/aching.  Next  Pain is increased with sitting, walking, exercise.  No change with coughing, bowel movement, standing, relaxation, sneezing.    Currently using NSAIDs for pain management.    Moderate relief with heat/ice therapy and chiropractic manipulation.    Allergies latex or contrast dye.    Patient reports 2 years of bilateral arm weakness.  Difficulty sustaining activities with her arms raised.  Feels tired and painful after minimal activity.  Also reports some pain and weakness in the bilateral thighs with going up steps.  However his weakness is not nearly as notable as in the upper arms.  Reports upper arm weakness has become worse over the last 2 months.    She has history of parathyroidectomy and notably parathyroid dysfunction was discovered during work up for arm weakness. Entire arm gets painful and weak with hand swelling in the AM. Denies balance dysfunction, denies fine motor deficits such as buttoning her shirt or picking up small objects. She reports hand swelling and tingling at nithgt.     Review of Systems:    Review of Systems   Musculoskeletal:  Positive for arthralgias, joint swelling, myalgias and neck pain.   Neurological:  Positive for dizziness, numbness and headaches.   Psychiatric/Behavioral:  The patient is  nervous/anxious.         Depression            Past Medical History:   Diagnosis Date    Depression Unsure    Feel down alot, feel lonely    Hyperlipidemia     Kidney stone Unsure    Personal history of COVID-19 2021    Status post parathyroidectomy 2023       Past Surgical History:   Procedure Laterality Date     SECTION      OOPHORECTOMY Right     age 47    NE PARATHYROIDECTOMY/EXPLORATION PARATHYROIDS Left 2023    Procedure: MINIMALLY INVASIVE LEFT PARATHYROIDECTOMY,;  Surgeon: Ranjan Young MD;  Location: [facility];  Service: Surgical Oncology    US GUIDED THYROID BIOPSY  10/13/2022       Family History   Problem Relation Age of Onset    Cancer Mother 65        Unknown type    Diabetes type II Father     Diabetes Father     Clotting disorder Father     Lung cancer Father 62        mesothylioma    No Known Problems Sister     No Known Problems Sister     No Known Problems Daughter     No Known Problems Brother     Breast cancer Neg Hx        Social History     Occupational History    Not on file   Tobacco Use    Smoking status: Never     Passive exposure: Never    Smokeless tobacco: Never   Vaping Use    Vaping status: Never Used   Substance and Sexual Activity    Alcohol use: Never    Drug use: Never    Sexual activity: Yes     Partners: Male         Current Outpatient Medications:     Ascorbic Acid (VITAMIN C PO), Take by mouth, Disp: , Rfl:     b complex vitamins capsule, Take 1 capsule by mouth daily, Disp: , Rfl:     buPROPion (WELLBUTRIN XL) 300 mg 24 hr tablet, take 1 tablet by mouth every morning, Disp: 30 tablet, Rfl: 0    famotidine (PEPCID) 20 mg tablet, Take 1 tablet (20 mg total) by mouth 2 (two) times a day, Disp: 30 tablet, Rfl: 0    ondansetron (ZOFRAN) 4 mg tablet, Take 1 tablet (4 mg total) by mouth every 6 (six) hours As needed for nausea/vomiting, Disp: 20 tablet, Rfl: 0    VITAMIN D PO, Take by mouth in the morning, Disp: , Rfl:     Vitamin D, Ergocalciferol, 10153  "units CAPS, Take 50,000 Units by mouth once a week, Disp: 12 capsule, Rfl: 0    escitalopram (LEXAPRO) 5 mg tablet, Take 1 tablet (5 mg total) by mouth daily, Disp: 30 tablet, Rfl: 0    No Known Allergies    Physical Exam:    Ht 5' 5\" (1.651 m)   Wt 75.4 kg (166 lb 3.2 oz)   LMP 08/01/2023 (Exact Date)   BMI 27.66 kg/m²     Constitutional: normal, well developed, well nourished, alert, in no distress and non-toxic and no overt pain behavior.  Eyes: anicteric  HEENT: grossly intact  Neck: supple, symmetric, trachea midline and no masses   Pulmonary:even and unlabored  Cardiovascular:No edema or pitting edema present  Skin:Normal without rashes or lesions and well hydrated  Psychiatric:Mood and affect appropriate  Neurologic:Cranial Nerves II-XII grossly intact  Musculoskeletal:normal    Cervical Spine Exam    Appearance:  Normal lordosis  Palpation/Tenderness:  left cervical paraspinal tenderness  right cervical paraspinal tenderness  left trapezium tenderness  right trapezium tenderness  Sensory:  no sensory deficits noted  Range of Motion:  Full range of motion with limitations in extension, flexion, bilateral rotation. There is some mentioned pain with end range flexion and and end range bilateral rotation  Motor Strength:  Left Arm Flexion  5/5  Left Arm Extension  5/5  Right Arm Flexion  5/5  Right Arm Extension  5/5  Left Wrist Flexion  5/5  Left Wrist Extension  5/5  Right Wrist Flexion  5/5  Right Wrist Extension  5/5  Left Finger Abduction  5/5  Right Finger Abduction  5/5  Left Pincer Grasp  5/5  Right Pincer Grasp  5/5  Left    5/5  Right   5/5  Reflexes:  Left Biceps:  2+   Right Biceps:  2+   Left Brachioradialis:  2+   Right Brachioradialis:  2+   Left Triceps:  2+   Right Triceps:  2+   Left Patellar:  2+   Right Patellar:  2+   Left Achilles:  2+   Right Achilles:  2+   Special Tests:  Left Bravo's sign  negative  Right Bravo's sign  negative      Imaging    CERVICAL SPINE  3/24/25   "   INDICATION:   Other symptoms and signs involving the musculoskeletal system.      COMPARISON: None.      VIEWS:  XR SPINE CERVICAL COMPLETE 4 OR 5 VW NON INJURY   Images: 5     FINDINGS:     No fracture.      Mild straightening with degenerative retrolisthesis C6-7.     Moderate disc height loss C6-7.     Bilateral C6-7 neural foraminal narrowing with right C3-4 neural foraminal narrowing.     The prevertebral soft tissues are within normal limits.       The lung apices are clear.     IMPRESSION:     No acute osseous abnormality.     Degenerative changes as above.       No orders to display       Orders Placed This Encounter   Procedures    Durable Medical Equipment    EMG 2 Limb Upper Extremity

## 2025-04-14 ENCOUNTER — DOCUMENTATION (OUTPATIENT)
Dept: PAIN MEDICINE | Facility: CLINIC | Age: 55
End: 2025-04-14

## 2025-04-14 ENCOUNTER — CONSULT (OUTPATIENT)
Dept: PAIN MEDICINE | Facility: CLINIC | Age: 55
End: 2025-04-14
Payer: COMMERCIAL

## 2025-04-14 VITALS — WEIGHT: 166.2 LBS | BODY MASS INDEX: 27.69 KG/M2 | HEIGHT: 65 IN

## 2025-04-14 DIAGNOSIS — R29.898 UPPER EXTREMITY WEAKNESS: ICD-10-CM

## 2025-04-14 DIAGNOSIS — M79.18 MYOFASCIAL PAIN SYNDROME: Primary | ICD-10-CM

## 2025-04-14 DIAGNOSIS — M54.2 PAIN OF CERVICAL SPINE: ICD-10-CM

## 2025-04-14 PROCEDURE — 99204 OFFICE O/P NEW MOD 45 MIN: CPT | Performed by: STUDENT IN AN ORGANIZED HEALTH CARE EDUCATION/TRAINING PROGRAM

## 2025-04-14 NOTE — PATIENT INSTRUCTIONS
Patient Education     Trigger Point Injection   Why is this procedure done?   A trigger point is a very painful area in a muscle. You may have a lump or feel a knot. The trigger point causes pain right where it is, or in the area around the trigger point. You may have less movement in your neck, arm, or leg if you have a trigger point. Your pain may increase if someone presses on this area. You may have pain far away from the trigger point. You may even have pain when you are not moving.   This kind of injection is used to help lower pain. When your pain is less, you may be able to move more and do more physical therapy. The goal is to break the pain cycle at this very painful spot.  What will the results be?   Less pain  Less swelling in the nerves  Better movement  What happens before the procedure?   Your doctor will take your history and do an exam. Talk to the doctor about:  All the drugs you are taking. Be sure to include all prescription and over-the-counter (OTC) drugs, and herbal supplements. Tell the doctor about any drug allergy. Bring a list of drugs you take with you.  If you have high blood sugar or diabetes. Your drugs may need to be changed.  Any bleeding problems. Be sure to tell your doctor if you are taking any drugs that may cause bleeding. Some of these are warfarin, rivaroxaban, apixaban, ticagrelor, clopidogrel, ketorolac, ibuprofen, naproxen, or aspirin. Certain vitamins and herbs, such as garlic and fish oil, may also add to the risk for bleeding. You may need to stop these drugs as well. Talk to your doctor about them.  If you need to stop eating or drinking before your procedure.  You will not be allowed to drive right away after the procedure. Ask a family member or a friend to drive you home.  What happens during the procedure?   The painful area is cleaned with a special soap. Your doctor may give you a drug to numb the painful area. Once the point of the pain is located, your doctor  will inject the drug into this trigger point. The whole procedure will take only a few minutes.  What happens after the procedure?   You can go home after the procedure.  You will feel numb in the area where the shot is given.  You should feel less pain right away.  What care is needed at home?   Ask your doctor what you need to do when you go home. Make sure you ask questions if you do not understand what the doctor says. This way you will know what you need to do.   Your doctor or physical therapist may give you some muscle stretching exercises to do.  Apply ice to the injection site if it is sore. Place an ice pack or a bag of frozen peas wrapped in a towel over the painful part. Never put ice right on the skin. Do not leave the ice on more than 10 to 15 minutes at a time.  What follow-up care is needed?   Your doctor may ask you to make visits to the office to check on your progress. Be sure to keep these visits.  Your doctor may ask you to see a physical therapist for exercises. Be sure to keep these visits.  Your doctor may ask you to do exercises for the area that was affected by the trigger point. Do the exercises as directed at home.  What problems could happen?   Infection  Blood clot  Lung collapse or trouble breathing if the injection was in the chest or back  Muscle pain does not go away  When do I need to call the doctor?   Signs of infection. These include a fever of 100.4°F (38°C) or higher, chills.  Signs of infection at shot site. These include swelling, redness, warmth around the wound; too much pain when touched; yellowish, greenish, or bloody discharge.  Shortness of breath or chest pain. If you have this sign, go to the ER right away.  Numbness, tingling, or weakness where the shot was given  Last Reviewed Date   2020-10-13  Consumer Information Use and Disclaimer   This generalized information is a limited summary of diagnosis, treatment, and/or medication information. It is not meant to be  comprehensive and should be used as a tool to help the user understand and/or assess potential diagnostic and treatment options. It does NOT include all information about conditions, treatments, medications, side effects, or risks that may apply to a specific patient. It is not intended to be medical advice or a substitute for the medical advice, diagnosis, or treatment of a health care provider based on the health care provider's examination and assessment of a patient’s specific and unique circumstances. Patients must speak with a health care provider for complete information about their health, medical questions, and treatment options, including any risks or benefits regarding use of medications. This information does not endorse any treatments or medications as safe, effective, or approved for treating a specific patient. [employer] Inc[employer] and its affiliates disclaim any warranty or liability relating to this information or the use thereof. The use of this information is governed by the Terms of Use, available at https://www.woltersClickDiagnosticser.com/en/know/clinical-effectiveness-terms   Copyright   Copyright © 2024 [employer], Inc[employer] and its affiliates and/or licensors. All rights reserved.

## 2025-04-21 DIAGNOSIS — F41.9 ANXIETY AND DEPRESSION: ICD-10-CM

## 2025-04-21 DIAGNOSIS — F32.A ANXIETY AND DEPRESSION: ICD-10-CM

## 2025-04-21 RX ORDER — BUPROPION HYDROCHLORIDE 300 MG/1
300 TABLET ORAL EVERY MORNING
Qty: 30 TABLET | Refills: 5 | Status: SHIPPED | OUTPATIENT
Start: 2025-04-21

## 2025-05-02 ENCOUNTER — TELEPHONE (OUTPATIENT)
Age: 55
End: 2025-05-02

## 2025-05-02 NOTE — TELEPHONE ENCOUNTER
Patient called because she remembered after speaking to the nurse earlier today, She had TSH, 3rd generation with Free T4 reflex done back in 11/2024. She wants to know if she has to have that one done again. Please give the patient a call back to let her know.

## 2025-05-05 NOTE — TELEPHONE ENCOUNTER
Pt returned call in regard to labs. Communicated providers response verbatim and that she recommended repeating levels unless symptoms are unchanged from November 2024. Pt verbalized she will complete labs. Communicated orders are placed. She is requesting labs be sent to [facility] in [place]. Thank you.    Fax: [phone]

## 2025-05-14 ENCOUNTER — TELEPHONE (OUTPATIENT)
Age: 55
End: 2025-05-14

## 2025-05-14 DIAGNOSIS — F41.9 ANXIETY AND DEPRESSION: Primary | ICD-10-CM

## 2025-05-14 DIAGNOSIS — F32.A ANXIETY AND DEPRESSION: Primary | ICD-10-CM

## 2025-05-14 RX ORDER — BUPROPION HYDROCHLORIDE 150 MG/1
150 TABLET ORAL EVERY MORNING
Qty: 30 TABLET | Refills: 0 | Status: SHIPPED | OUTPATIENT
Start: 2025-05-14 | End: 2025-06-13

## 2025-05-14 NOTE — TELEPHONE ENCOUNTER
Sent in lower dose -- 150 mg   Would recommend 150 mg QD x 2 weeks, half a tablet (75 mg) QD x 2 weeks, then stop

## 2025-05-14 NOTE — TELEPHONE ENCOUNTER
Patient called stating she would like to wean off of the Bupropion medication. She is currently taking 300mg and she would like for PCP to prescribe a lower dose. She would like to wean off gradually so that she doesn't get any side affects.       Patient would appreciate it if PCP can send the script to   [id number] [place] [place] [phone]       Patient would appreciate a call back with an update.   [phone]

## 2025-05-14 NOTE — TELEPHONE ENCOUNTER
Patient called in to verify Directons of her new script. Read message verbatim from provider to patient. Patient expressed understanding.

## 2025-05-27 ENCOUNTER — OFFICE VISIT (OUTPATIENT)
Dept: FAMILY MEDICINE CLINIC | Facility: CLINIC | Age: 55
End: 2025-05-27
Payer: COMMERCIAL

## 2025-05-27 VITALS
TEMPERATURE: 98.9 F | DIASTOLIC BLOOD PRESSURE: 72 MMHG | SYSTOLIC BLOOD PRESSURE: 116 MMHG | HEART RATE: 102 BPM | BODY MASS INDEX: 27.89 KG/M2 | WEIGHT: 167.4 LBS | OXYGEN SATURATION: 98 % | HEIGHT: 65 IN

## 2025-05-27 DIAGNOSIS — J02.9 SORE THROAT: ICD-10-CM

## 2025-05-27 DIAGNOSIS — J02.9 PHARYNGITIS, UNSPECIFIED ETIOLOGY: Primary | ICD-10-CM

## 2025-05-27 DIAGNOSIS — R52 BODY ACHES: ICD-10-CM

## 2025-05-27 LAB
SARS-COV-2 AG UPPER RESP QL IA: NEGATIVE
VALID CONTROL: NORMAL

## 2025-05-27 PROCEDURE — 99213 OFFICE O/P EST LOW 20 MIN: CPT

## 2025-05-27 PROCEDURE — 87070 CULTURE OTHR SPECIMN AEROBIC: CPT

## 2025-05-27 PROCEDURE — 87811 SARS-COV-2 COVID19 W/OPTIC: CPT

## 2025-05-27 RX ORDER — AMOXICILLIN 500 MG/1
500 TABLET, FILM COATED ORAL 2 TIMES DAILY
Qty: 14 TABLET | Refills: 0 | Status: SHIPPED | OUTPATIENT
Start: 2025-05-27 | End: 2025-06-03

## 2025-05-27 NOTE — PROGRESS NOTES
"Name: Nancy Luna      : 1970      MRN: [id number]  Encounter Provider: Betty Alcaraz PA-C  Encounter Date: 2025   Encounter department: [facility]  :  Assessment & Plan  Pharyngitis, unspecified etiology  Sore throat, cough, congestion   Rapid covid negative  Throat culture obtained and sent out for strep testing  Physical exam reveals posterior pharyngeal erythema with right sided exudate. Otherwise unremarkable -- clear lungs, afebrile.   Based on exam findings, will cover for bacterial pharyngitis with amoxicillin 500 mg BID x 7 days. If strep culture positive, will increase duration to 10 days.   Discussed side effects of upset stomach, diarrhea, headache. Take with food  Otherwise, continue supportive care and stay hydrated  To call if symptoms persist/worsen despite medication     Orders:    amoxicillin (AMOXIL) 500 MG tablet; Take 1 tablet (500 mg total) by mouth 2 (two) times a day for 7 days    Body aches    Orders:    POCT Rapid Covid Ag    Sore throat                History of Present Illness   CC: congestion, cough, sore throat, body aches     Pt presents for evaluation of cough, congestion and sore throat   She has been taking OTC halls cough drops, no other medications  She is staying hydrated   No fevers  No chest pain or SOB      Review of Systems   Constitutional:  Negative for chills and fever.   HENT:  Positive for congestion, rhinorrhea and sore throat. Negative for ear pain and sinus pain.    Respiratory:  Positive for cough. Negative for chest tightness, shortness of breath and wheezing.    Cardiovascular:  Negative for chest pain and palpitations.   Neurological:  Negative for dizziness, light-headedness and headaches.       Objective   /72   Pulse 102   Temp 98.9 °F (37.2 °C)   Ht 5' 5\" (1.651 m)   Wt 75.9 kg (167 lb 6.4 oz)   LMP 2023 (Exact Date)   SpO2 98%   BMI 27.86 kg/m²      Physical Exam  Vitals reviewed.   Constitutional: "       General: She is not in acute distress.     Appearance: Normal appearance. She is not ill-appearing.   HENT:      Head: Normocephalic and atraumatic.      Right Ear: Tympanic membrane, ear canal and external ear normal. There is no impacted cerumen.      Left Ear: Tympanic membrane, ear canal and external ear normal. There is no impacted cerumen.      Nose: Congestion present. No rhinorrhea.      Mouth/Throat:      Mouth: Mucous membranes are moist.      Pharynx: Oropharyngeal exudate (R sided) and posterior oropharyngeal erythema present.     Eyes:      Conjunctiva/sclera: Conjunctivae normal.       Cardiovascular:      Rate and Rhythm: Normal rate and regular rhythm.      Heart sounds: Normal heart sounds. No murmur heard.  Pulmonary:      Effort: Pulmonary effort is normal. No respiratory distress.      Breath sounds: Normal breath sounds. No wheezing, rhonchi or rales.     Musculoskeletal:      Cervical back: Neck supple.   Lymphadenopathy:      Cervical: No cervical adenopathy.     Skin:     General: Skin is warm.     Neurological:      General: No focal deficit present.      Mental Status: She is alert.      Gait: Gait normal.     Psychiatric:         Mood and Affect: Mood normal.

## 2025-05-29 ENCOUNTER — RESULTS FOLLOW-UP (OUTPATIENT)
Dept: FAMILY MEDICINE CLINIC | Facility: CLINIC | Age: 55
End: 2025-05-29

## 2025-05-30 DIAGNOSIS — E55.9 VITAMIN D DEFICIENCY: Primary | ICD-10-CM

## 2025-05-30 DIAGNOSIS — E55.9 VITAMIN D DEFICIENCY: ICD-10-CM

## 2025-05-30 LAB — BACTERIA THROAT CULT: NORMAL

## 2025-05-30 RX ORDER — ACETAMINOPHEN 160 MG
2000 TABLET,DISINTEGRATING ORAL DAILY
Qty: 30 CAPSULE | Refills: 0 | Status: SHIPPED | OUTPATIENT
Start: 2025-05-30

## 2025-05-30 RX ORDER — ERGOCALCIFEROL 1.25 MG/1
50000 CAPSULE, LIQUID FILLED ORAL WEEKLY
Qty: 12 CAPSULE | Refills: 0 | OUTPATIENT
Start: 2025-05-30

## 2025-05-30 NOTE — TELEPHONE ENCOUNTER
Given improved levels with prior testing, recommend transition to 2000 units daily OTC or I can send in

## 2025-06-02 ENCOUNTER — ANNUAL EXAM (OUTPATIENT)
Age: 55
End: 2025-06-02
Payer: COMMERCIAL

## 2025-06-02 VITALS — DIASTOLIC BLOOD PRESSURE: 74 MMHG | BODY MASS INDEX: 28.12 KG/M2 | WEIGHT: 169 LBS | SYSTOLIC BLOOD PRESSURE: 110 MMHG

## 2025-06-02 DIAGNOSIS — R92.30 DENSE BREAST: ICD-10-CM

## 2025-06-02 DIAGNOSIS — Z01.419 ENCOUNTER FOR GYNECOLOGICAL EXAMINATION WITHOUT ABNORMAL FINDING: Primary | ICD-10-CM

## 2025-06-02 DIAGNOSIS — Z12.31 ENCOUNTER FOR SCREENING MAMMOGRAM FOR MALIGNANT NEOPLASM OF BREAST: ICD-10-CM

## 2025-06-02 DIAGNOSIS — N95.2 ATROPHIC VAGINITIS: ICD-10-CM

## 2025-06-02 PROCEDURE — 99396 PREV VISIT EST AGE 40-64: CPT | Performed by: OBSTETRICS & GYNECOLOGY

## 2025-06-02 RX ORDER — ESTRADIOL 0.1 MG/G
1 CREAM VAGINAL 3 TIMES WEEKLY
Qty: 42.5 G | Refills: 3 | Status: SHIPPED | OUTPATIENT
Start: 2025-06-02

## 2025-06-02 NOTE — PROGRESS NOTES
Subjective      Nancy Luna is a 54 y.o. G1, P1 female who presents for annual well woman exam.  Patient reports no menses since 2023.  We reviewed recent blood work that showed FSH and LH in menopausal range.  Patient reports no bleeding, spotting, or discharge.  Patient reports Yes  hot flashes/night sweats hot flashes and night sweats have actually slightly decreased still noticeable, Yes  pain problems intercourse, Yes  vaginal dryness, sleeping fairly well.   Most significant symptom is severe vaginal dryness with excessive discomfort with relations and even day-to-day irritation and dryness.  Patient has had this symptom starting in  and has steadily gotten worse.  Patient reports actually stopped vaginal estrogen cream previously due to concerns about risks we reviewed again possible risks and benefits and patient would like to restart we also discussed different moisturizers and suppositories to assist with symptoms..    Current contraception: post menopausal status, tubal ligation  History of abnormal Pap smear: no  Family history of uterine or ovarian cancer: no  Regular self breast exam: yes  History of abnormal mammogram: no  Family history of breast cancer: no    Menstrual History:  OB History          1    Para   1    Term   0            AB        Living   1         SAB        IAB        Ectopic        Multiple        Live Births   1                Menarche age: 16  Patient's last menstrual period was 2023 (exact date).       The following portions of the patient's history were reviewed and updated as appropriate: allergies, current medications, past family history, past medical history, past social history, past surgical history, and problem list.  Past Medical History[1]  Past Surgical History[2]  OB History          1    Para   1    Term   0            AB        Living   1         SAB        IAB        Ectopic        Multiple        Live Births   1                  Review of Systems  Review of Systems   Constitutional: Negative.  Negative for chills and fever.   HENT: Negative.  Negative for ear pain and sore throat.    Eyes: Negative.  Negative for pain and visual disturbance.   Respiratory: Negative.  Negative for cough and shortness of breath.    Cardiovascular: Negative.  Negative for chest pain and palpitations.   Gastrointestinal: Negative.  Negative for abdominal pain and vomiting.   Genitourinary:  Positive for vaginal pain. Negative for dysuria and hematuria.   Musculoskeletal: Negative.  Negative for arthralgias and back pain.   Skin: Negative.  Negative for color change and rash.   Neurological:  Positive for headaches. Negative for seizures and syncope.   All other systems reviewed and are negative.    Objective      /74 (BP Location: Left arm, Patient Position: Sitting, Cuff Size: Standard)   Wt 76.7 kg (169 lb)   LMP 08/01/2023 (Exact Date)   BMI 28.12 kg/m²   Vitals:    06/02/25 1024   BP: 110/74   BP Location: Left arm   Patient Position: Sitting   Cuff Size: Standard   Weight: 76.7 kg (169 lb)     General:   alert and oriented, in no acute distress   Heart: regular rate and rhythm, S1, S2 normal, no murmur, click, rub or gallop   Lungs: clear to auscultation bilaterally   Abdomen: soft, non-tender, without masses or organomegaly, vertical scar below umbilicus well-healed   Vulva: normal   Vagina: normal mucosa, atrophic findings pale tissue   Cervix: no cervical motion tenderness and no lesions   Uterus: normal size, mobile, non-tender   Adnexa: normal adnexa and no mass, fullness, tenderness   Breast inspection negative, no nipple discharge or bleeding, no masses or nodularity palpable  Rectal deferred, normal Cologuard December 2024  Thyroid normal no masses or nodules       Assessment & Plan  Encounter for gynecological examination without abnormal finding  54-year-old G1, P1 here for annual exam with significant atrophic vaginitis  other menopausal symptoms are slightly improved.  Last Pap 2024 normal, last mammogram 2025 normal, dense last ABUS 2025 normal, last Cologuard 2024 normal       Atrophic vaginitis    Orders:    estradiol (ESTRACE VAGINAL) 0.1 mg/g vaginal cream; Insert 1 g into the vagina 3 (three) times a week    Encounter for screening mammogram for malignant neoplasm of breast    Orders:    Mammo screening bilateral w 3d and cad; Future    Dense breast    Orders:    US breast screening bilateral complete (ABUS); Future    Orders for mammogram and ultrasound for next January, Estrace cream prescribed encourage patient to call or message with any concerns or issues return in 1 year or sooner as needed         [1]   Past Medical History:  Diagnosis Date    Allergic     Arthritis     Cluster headache     Had for a couple of years off and on    Depression Unsure    Feel down alot, feel lonely    Headache(784.0)     Headaches have calmed after my parathyroid surgery. Just had 1 for a couple of days.    Headache, tension-type     Had for a couple of years off and on    Hyperlipidemia     Kidney stone Unsure    Otitis media     Have had in the past    Personal history of COVID-19 2021    Status post parathyroidectomy 2023   [2]   Past Surgical History:  Procedure Laterality Date     SECTION      NECK SURGERY  2023    Had 1 parathyroid removed    OOPHORECTOMY Right     age 47    [place] PARATHYROIDECTOMY/EXPLORATION PARATHYROIDS Left 2023    Procedure: MINIMALLY INVASIVE LEFT PARATHYROIDECTOMY,;  Surgeon: Ranjan Young MD;  Location: [facility];  Service: Surgical Oncology    TUBAL LIGATION  Unsure    I dont remember the exact date    US GUIDED THYROID BIOPSY  10/13/2022

## 2025-06-18 ENCOUNTER — PROCEDURE VISIT (OUTPATIENT)
Dept: NEUROLOGY | Facility: CLINIC | Age: 55
End: 2025-06-18
Attending: STUDENT IN AN ORGANIZED HEALTH CARE EDUCATION/TRAINING PROGRAM
Payer: COMMERCIAL

## 2025-06-18 DIAGNOSIS — R29.898 UPPER EXTREMITY WEAKNESS: ICD-10-CM

## 2025-06-18 PROCEDURE — 95913 NRV CNDJ TEST 13/> STUDIES: CPT | Performed by: PHYSICAL MEDICINE & REHABILITATION

## 2025-06-18 PROCEDURE — 95886 MUSC TEST DONE W/N TEST COMP: CPT | Performed by: PHYSICAL MEDICINE & REHABILITATION

## 2025-06-20 DIAGNOSIS — N95.2 ATROPHIC VAGINITIS: Primary | ICD-10-CM

## 2025-06-20 RX ORDER — CONJUGATED ESTROGENS 0.62 MG/G
1 CREAM VAGINAL 3 TIMES WEEKLY
Qty: 60 G | Refills: 3 | Status: SHIPPED | OUTPATIENT
Start: 2025-06-20

## 2025-07-06 DIAGNOSIS — E55.9 VITAMIN D DEFICIENCY: ICD-10-CM

## 2025-07-07 RX ORDER — ACETAMINOPHEN 160 MG
2000 TABLET,DISINTEGRATING ORAL DAILY
Qty: 30 CAPSULE | Refills: 0 | Status: SHIPPED | OUTPATIENT
Start: 2025-07-07

## 2025-08-03 DIAGNOSIS — E55.9 VITAMIN D DEFICIENCY: ICD-10-CM

## 2025-08-05 RX ORDER — ACETAMINOPHEN 160 MG
2000 TABLET,DISINTEGRATING ORAL DAILY
Qty: 30 CAPSULE | Refills: 5 | Status: SHIPPED | OUTPATIENT
Start: 2025-08-05

## (undated) DEVICE — 3M™ STERI-STRIP™ REINFORCED ADHESIVE SKIN CLOSURES, R1547, 1/2 IN X 4 IN (12 MM X 100 MM), 6 STRIPS/ENVELOPE: Brand: 3M™ STERI-STRIP™

## (undated) DEVICE — SUT VICRYL 3-0 SH 27 IN J416H

## (undated) DEVICE — SURGICEL 4 X 8

## (undated) DEVICE — SUT MONOCRYL 5-0 P-3 18 IN Y493G

## (undated) DEVICE — PACK UNIVERSAL NECK

## (undated) DEVICE — MINOR PROCEDURE DRAPE: Brand: CONVERTORS

## (undated) DEVICE — LIGACLIP MCA MULTIPLE CLIP APPLIERS, 20 SMALL CLIPS: Brand: LIGACLIP

## (undated) DEVICE — 3M™ STERI-STRIP™ COMPOUND BENZOIN TINCTURE 40 BAGS/CARTON 4 CARTONS/CASE C1544: Brand: 3M™ STERI-STRIP™

## (undated) DEVICE — GLOVE SRG BIOGEL 7

## (undated) DEVICE — SUT SILK 3-0 18 IN A184H

## (undated) DEVICE — GLOVE INDICATOR PI UNDERGLOVE SZ 7 BLUE

## (undated) DEVICE — DRAPE SURGIKIT SADDLE BAG

## (undated) DEVICE — ELECTRODE BLADE MOD E-Z CLEAN 2.5IN 6.4CM -0012M

## (undated) DEVICE — ROSEBUD DISSECTORS: Brand: DEROYAL

## (undated) DEVICE — SUT VICRYL 4-0 PS-2 27 IN J426H